# Patient Record
Sex: FEMALE | Race: BLACK OR AFRICAN AMERICAN | Employment: UNEMPLOYED | URBAN - METROPOLITAN AREA
[De-identification: names, ages, dates, MRNs, and addresses within clinical notes are randomized per-mention and may not be internally consistent; named-entity substitution may affect disease eponyms.]

---

## 2019-06-27 ENCOUNTER — HOSPITAL ENCOUNTER (EMERGENCY)
Age: 24
Discharge: HOME | End: 2019-06-27
Attending: EMERGENCY MEDICINE
Payer: MEDICAID

## 2019-06-27 ENCOUNTER — APPOINTMENT (OUTPATIENT)
Dept: ULTRASOUND IMAGING | Age: 24
End: 2019-06-27
Payer: MEDICAID

## 2019-06-27 VITALS
OXYGEN SATURATION: 100 % | SYSTOLIC BLOOD PRESSURE: 105 MMHG | DIASTOLIC BLOOD PRESSURE: 63 MMHG | TEMPERATURE: 98.5 F | HEART RATE: 96 BPM | BODY MASS INDEX: 44.16 KG/M2 | WEIGHT: 240 LBS | HEIGHT: 62 IN | RESPIRATION RATE: 20 BRPM

## 2019-06-27 DIAGNOSIS — R82.71 BACTERIA IN URINE: ICD-10-CM

## 2019-06-27 DIAGNOSIS — O21.9 VOMITING OF PREGNANCY, ANTEPARTUM: Primary | ICD-10-CM

## 2019-06-27 LAB
ABO/RH: NORMAL
ALBUMIN SERPL-MCNC: 3.8 G/DL (ref 3.5–5.2)
ALP BLD-CCNC: 67 U/L (ref 35–104)
ALT SERPL-CCNC: 26 U/L (ref 0–32)
AMORPHOUS: ABNORMAL
ANION GAP SERPL CALCULATED.3IONS-SCNC: 12 MMOL/L (ref 7–16)
AST SERPL-CCNC: 25 U/L (ref 0–31)
BACTERIA: ABNORMAL /HPF
BASOPHILS ABSOLUTE: 0.02 E9/L (ref 0–0.2)
BASOPHILS RELATIVE PERCENT: 0.2 % (ref 0–2)
BILIRUB SERPL-MCNC: 0.2 MG/DL (ref 0–1.2)
BILIRUBIN URINE: ABNORMAL
BLOOD, URINE: ABNORMAL
BUN BLDV-MCNC: 3 MG/DL (ref 6–20)
CALCIUM SERPL-MCNC: 8.7 MG/DL (ref 8.6–10.2)
CHLORIDE BLD-SCNC: 105 MMOL/L (ref 98–107)
CLARITY: ABNORMAL
CO2: 23 MMOL/L (ref 22–29)
COLOR: YELLOW
CREAT SERPL-MCNC: 0.7 MG/DL (ref 0.5–1)
EOSINOPHILS ABSOLUTE: 0.23 E9/L (ref 0.05–0.5)
EOSINOPHILS RELATIVE PERCENT: 2.7 % (ref 0–6)
EPITHELIAL CELLS, UA: ABNORMAL /HPF
GFR AFRICAN AMERICAN: >60
GFR NON-AFRICAN AMERICAN: >60 ML/MIN/1.73
GLUCOSE BLD-MCNC: 121 MG/DL (ref 74–99)
GLUCOSE URINE: NEGATIVE MG/DL
GONADOTROPIN, CHORIONIC (HCG) QUANT: 1900 MIU/ML
HCG, URINE, POC: POSITIVE
HCT VFR BLD CALC: 36.9 % (ref 34–48)
HEMOGLOBIN: 11.5 G/DL (ref 11.5–15.5)
IMMATURE GRANULOCYTES #: 0.03 E9/L
IMMATURE GRANULOCYTES %: 0.4 % (ref 0–5)
KETONES, URINE: 15 MG/DL
LEUKOCYTE ESTERASE, URINE: NEGATIVE
LIPASE: 5 U/L (ref 13–60)
LYMPHOCYTES ABSOLUTE: 1.88 E9/L (ref 1.5–4)
LYMPHOCYTES RELATIVE PERCENT: 22.2 % (ref 20–42)
Lab: NORMAL
MCH RBC QN AUTO: 26.9 PG (ref 26–35)
MCHC RBC AUTO-ENTMCNC: 31.2 % (ref 32–34.5)
MCV RBC AUTO: 86.4 FL (ref 80–99.9)
MONOCYTES ABSOLUTE: 0.68 E9/L (ref 0.1–0.95)
MONOCYTES RELATIVE PERCENT: 8 % (ref 2–12)
NEGATIVE QC PASS/FAIL: NORMAL
NEUTROPHILS ABSOLUTE: 5.64 E9/L (ref 1.8–7.3)
NEUTROPHILS RELATIVE PERCENT: 66.5 % (ref 43–80)
NITRITE, URINE: NEGATIVE
PDW BLD-RTO: 15 FL (ref 11.5–15)
PH UA: 6 (ref 5–9)
PLATELET # BLD: 331 E9/L (ref 130–450)
PMV BLD AUTO: 10.7 FL (ref 7–12)
POSITIVE QC PASS/FAIL: NORMAL
POTASSIUM REFLEX MAGNESIUM: 3.7 MMOL/L (ref 3.5–5)
PROTEIN UA: 30 MG/DL
RBC # BLD: 4.27 E12/L (ref 3.5–5.5)
RBC UA: ABNORMAL /HPF (ref 0–2)
SODIUM BLD-SCNC: 140 MMOL/L (ref 132–146)
SPECIFIC GRAVITY UA: >=1.03 (ref 1–1.03)
TOTAL PROTEIN: 7.4 G/DL (ref 6.4–8.3)
UROBILINOGEN, URINE: 1 E.U./DL
WBC # BLD: 8.5 E9/L (ref 4.5–11.5)
WBC UA: ABNORMAL /HPF (ref 0–5)

## 2019-06-27 PROCEDURE — 99284 EMERGENCY DEPT VISIT MOD MDM: CPT

## 2019-06-27 PROCEDURE — 84702 CHORIONIC GONADOTROPIN TEST: CPT

## 2019-06-27 PROCEDURE — 83690 ASSAY OF LIPASE: CPT

## 2019-06-27 PROCEDURE — 76817 TRANSVAGINAL US OBSTETRIC: CPT

## 2019-06-27 PROCEDURE — 2580000003 HC RX 258: Performed by: EMERGENCY MEDICINE

## 2019-06-27 PROCEDURE — 85025 COMPLETE CBC W/AUTO DIFF WBC: CPT

## 2019-06-27 PROCEDURE — 86901 BLOOD TYPING SEROLOGIC RH(D): CPT

## 2019-06-27 PROCEDURE — 96374 THER/PROPH/DIAG INJ IV PUSH: CPT

## 2019-06-27 PROCEDURE — 2500000003 HC RX 250 WO HCPCS: Performed by: EMERGENCY MEDICINE

## 2019-06-27 PROCEDURE — 86900 BLOOD TYPING SEROLOGIC ABO: CPT

## 2019-06-27 PROCEDURE — 80053 COMPREHEN METABOLIC PANEL: CPT

## 2019-06-27 PROCEDURE — 6360000002 HC RX W HCPCS: Performed by: EMERGENCY MEDICINE

## 2019-06-27 PROCEDURE — 96375 TX/PRO/DX INJ NEW DRUG ADDON: CPT

## 2019-06-27 PROCEDURE — 36415 COLL VENOUS BLD VENIPUNCTURE: CPT

## 2019-06-27 PROCEDURE — 81001 URINALYSIS AUTO W/SCOPE: CPT

## 2019-06-27 RX ORDER — 0.9 % SODIUM CHLORIDE 0.9 %
1000 INTRAVENOUS SOLUTION INTRAVENOUS ONCE
Status: COMPLETED | OUTPATIENT
Start: 2019-06-27 | End: 2019-06-27

## 2019-06-27 RX ORDER — PROMETHAZINE HYDROCHLORIDE 25 MG/1
25 TABLET ORAL EVERY 6 HOURS PRN
Qty: 30 TABLET | Refills: 1 | Status: SHIPPED | OUTPATIENT
Start: 2019-06-27 | End: 2019-07-04

## 2019-06-27 RX ORDER — CEPHALEXIN 500 MG/1
500 CAPSULE ORAL 2 TIMES DAILY
Qty: 14 CAPSULE | Refills: 0 | Status: SHIPPED | OUTPATIENT
Start: 2019-06-27 | End: 2019-07-07

## 2019-06-27 RX ORDER — ONDANSETRON 2 MG/ML
4 INJECTION INTRAMUSCULAR; INTRAVENOUS ONCE
Status: COMPLETED | OUTPATIENT
Start: 2019-06-27 | End: 2019-06-27

## 2019-06-27 RX ADMIN — SODIUM CHLORIDE 1000 ML: 9 INJECTION, SOLUTION INTRAVENOUS at 15:14

## 2019-06-27 RX ADMIN — FAMOTIDINE 20 MG: 10 INJECTION, SOLUTION INTRAVENOUS at 15:15

## 2019-06-27 RX ADMIN — ONDANSETRON 4 MG: 2 INJECTION INTRAMUSCULAR; INTRAVENOUS at 15:15

## 2019-06-27 NOTE — ED PROVIDER NOTES
HPI:  6/27/19,   Time: 2:34 PM         Елена Mcginnis is a 22 y.o. female presenting to the ED for abdominal pain, nausea, diarrhea, beginning 2 ago. The complaint has been intermittent, mild in severity, and worsened by nothing. Patient denies any alleviating factors. She has not tried any medication prior to arrival.  She states that she does have a history of acid reflux and takes famotidine as needed. Patient states that she has had a cholecystectomy. She denies any vomiting, constipation, dysuria, hematuria, melena, hematochezia. No sick contacts. LMP 5/27/19    ROS:   Pertinent positives and negatives are stated within HPI, all other systems reviewed and are negative.  --------------------------------------------- PAST HISTORY ---------------------------------------------  Past Medical History:  has a past medical history of Asthma and GERD (gastroesophageal reflux disease). Past Surgical History:  has a past surgical history that includes Cholecystectomy. Social History:  reports that she has never smoked. She does not have any smokeless tobacco history on file. She reports that she drank alcohol. She reports that she does not use drugs. Family History: family history is not on file. The patients home medications have been reviewed. Allergies:  Toradol [ketorolac tromethamine] and Tramadol    -------------------------------------------------- RESULTS -------------------------------------------------  All laboratory and radiology results have been personally reviewed by myself   LABS:  Results for orders placed or performed during the hospital encounter of 06/27/19   CBC Auto Differential   Result Value Ref Range    WBC 8.5 4.5 - 11.5 E9/L    RBC 4.27 3.50 - 5.50 E12/L    Hemoglobin 11.5 11.5 - 15.5 g/dL    Hematocrit 36.9 34.0 - 48.0 %    MCV 86.4 80.0 - 99.9 fL    MCH 26.9 26.0 - 35.0 pg    MCHC 31.2 (L) 32.0 - 34.5 %    RDW 15.0 11.5 - 15.0 fL    Platelets 294 872 - 739 E9/L    MPV 10.7 7.0 - 12.0 fL    Neutrophils % 66.5 43.0 - 80.0 %    Immature Granulocytes % 0.4 0.0 - 5.0 %    Lymphocytes % 22.2 20.0 - 42.0 %    Monocytes % 8.0 2.0 - 12.0 %    Eosinophils % 2.7 0.0 - 6.0 %    Basophils % 0.2 0.0 - 2.0 %    Neutrophils # 5.64 1.80 - 7.30 E9/L    Immature Granulocytes # 0.03 E9/L    Lymphocytes # 1.88 1.50 - 4.00 E9/L    Monocytes # 0.68 0.10 - 0.95 E9/L    Eosinophils # 0.23 0.05 - 0.50 E9/L    Basophils # 0.02 0.00 - 0.20 E9/L   Comprehensive Metabolic Panel w/ Reflex to MG   Result Value Ref Range    Sodium 140 132 - 146 mmol/L    Potassium reflex Magnesium 3.7 3.5 - 5.0 mmol/L    Chloride 105 98 - 107 mmol/L    CO2 23 22 - 29 mmol/L    Anion Gap 12 7 - 16 mmol/L    Glucose 121 (H) 74 - 99 mg/dL    BUN 3 (L) 6 - 20 mg/dL    CREATININE 0.7 0.5 - 1.0 mg/dL    GFR Non-African American >60 >=60 mL/min/1.73    GFR African American >60     Calcium 8.7 8.6 - 10.2 mg/dL    Total Protein 7.4 6.4 - 8.3 g/dL    Alb 3.8 3.5 - 5.2 g/dL    Total Bilirubin 0.2 0.0 - 1.2 mg/dL    Alkaline Phosphatase 67 35 - 104 U/L    ALT 26 0 - 32 U/L    AST 25 0 - 31 U/L   Lipase   Result Value Ref Range    Lipase 5 (L) 13 - 60 U/L   Urinalysis, reflex to microscopic   Result Value Ref Range    Color, UA Yellow Straw/Yellow    Clarity, UA CLOUDY (A) Clear    Glucose, Ur Negative Negative mg/dL    Bilirubin Urine SMALL (A) Negative    Ketones, Urine 15 (A) Negative mg/dL    Specific Gravity, UA >=1.030 1.005 - 1.030    Blood, Urine TRACE (A) Negative    pH, UA 6.0 5.0 - 9.0    Protein, UA 30 (A) Negative mg/dL    Urobilinogen, Urine 1.0 <2.0 E.U./dL    Nitrite, Urine Negative Negative    Leukocyte Esterase, Urine Negative Negative   Microscopic Urinalysis   Result Value Ref Range    WBC, UA 1-3 0 - 5 /HPF    RBC, UA 1-3 0 - 2 /HPF    Epi Cells MANY /HPF    Bacteria, UA FEW (A) /HPF    Amorphous, UA FEW    hCG, quantitative, pregnancy   Result Value Ref Range    hCG Quant 1900.0 (H) <10 mIU/mL   POC Pregnancy Urine Qual Result Value Ref Range    HCG, Urine, POC Positive Negative    Lot Number [id number]     Positive QC Pass/Fail Pass     Negative QC Pass/Fail Pass    ABO/RH   Result Value Ref Range    ABO/Rh O NEG        RADIOLOGY:  Interpreted by Radiologist.  US OB TRANSVAGINAL   Final Result   Small anechoic structure within the endometrium with a double decidual   sac sign, compatible with an early intrauterine pregnancy. Estimated   gestational age is 4 weeks and 6 days. ------------------------- NURSING NOTES AND VITALS REVIEWED ---------------------------   The nursing notes within the ED encounter and vital signs as below have been reviewed. /63   Pulse 96   Temp 98.5 °F (36.9 °C) (Oral)   Resp 20   Ht 5' 2\" (1.575 m)   Wt 240 lb (108.9 kg)   LMP 05/27/2019   SpO2 100%   BMI 43.90 kg/m²   Oxygen Saturation Interpretation: Normal      ---------------------------------------------------PHYSICAL EXAM--------------------------------------    Constitutional/General: Alert and oriented x3, well appearing, non toxic in NAD  Head: NC/AT  Eyes: PERRL, EOMI  Mouth: Oropharynx clear, handling secretions, no trismus  Neck: Supple, full ROM, no meningeal signs  Pulmonary: Lungs clear to auscultation bilaterally, no wheezes, rales, or rhonchi. Not in respiratory distress  Cardiovascular:  Regular rate and rhythm, no murmurs, gallops, or rubs. 2+ distal pulses  Abdomen: Soft, obese. Tenderness to palpation in the epigastric area. No rebound or guarding. No McBurney point tenderness. Normoactive bowel sounds. Extremities: Moves all extremities x 4.  Warm and well perfused  Skin: warm and dry without rash  Neurologic: GCS 15,  Psych: Normal Affect      ------------------------------ ED COURSE/MEDICAL DECISION MAKING----------------------  Medications   0.9 % sodium chloride bolus (0 mLs Intravenous Stopped 6/27/19 7841)   ondansetron (ZOFRAN) injection 4 mg (4 mg Intravenous Given 6/27/19 9776)   famotidine (PEPCID) injection 20 mg (20 mg Intravenous Given 6/27/19 3296)         Medical Decision Making:    Pregnancy test came back positive. Labs and imaging ordered. Ultrasound shows a 4-week 6-day intrauterine pregnancy. Patient is type O-. She denies any vaginal bleeding. She states that during her last pregnancy she was administered RhoGam.  Urinalysis has bacteria in it. I will start the patient on Keflex. I will also provide phenergan for her nausea. I instructed the patient to contact her OB/GYN for an appointment this week, to take the medication prescribed as directed, and to return for any worsening symptoms or vaginal bleeding. Patient is agreeable with plan of care. Counseling: The emergency provider has spoken with the patient and discussed todays results, in addition to providing specific details for the plan of care and counseling regarding the diagnosis and prognosis. Questions are answered at this time and they are agreeable with the plan.      --------------------------------- IMPRESSION AND DISPOSITION ---------------------------------    IMPRESSION  1. Vomiting of pregnancy, antepartum    2.  Bacteria in urine        DISPOSITION  Disposition: Discharge to home  Patient condition is good                  Tono Gonsalez MD  06/27/19 1462

## 2019-06-28 ENCOUNTER — HOSPITAL ENCOUNTER (EMERGENCY)
Age: 24
Discharge: HOME | End: 2019-06-29
Attending: EMERGENCY MEDICINE
Payer: MEDICAID

## 2019-06-28 VITALS
HEART RATE: 108 BPM | SYSTOLIC BLOOD PRESSURE: 146 MMHG | TEMPERATURE: 98.2 F | HEIGHT: 62 IN | WEIGHT: 240 LBS | DIASTOLIC BLOOD PRESSURE: 104 MMHG | RESPIRATION RATE: 26 BRPM | BODY MASS INDEX: 44.16 KG/M2

## 2019-06-28 DIAGNOSIS — O20.0 THREATENED MISCARRIAGE IN EARLY PREGNANCY: Primary | ICD-10-CM

## 2019-06-28 LAB
BILIRUBIN URINE: NEGATIVE
BLOOD, URINE: ABNORMAL
CLARITY: ABNORMAL
COLOR: YELLOW
GLUCOSE URINE: NEGATIVE MG/DL
KETONES, URINE: ABNORMAL MG/DL
LEUKOCYTE ESTERASE, URINE: NEGATIVE
NITRITE, URINE: NEGATIVE
PH UA: 6 (ref 5–9)
PROTEIN UA: ABNORMAL MG/DL
SPECIFIC GRAVITY UA: 1.02 (ref 1–1.03)
UROBILINOGEN, URINE: 1 E.U./DL

## 2019-06-28 PROCEDURE — 36415 COLL VENOUS BLD VENIPUNCTURE: CPT

## 2019-06-28 PROCEDURE — 84702 CHORIONIC GONADOTROPIN TEST: CPT

## 2019-06-28 PROCEDURE — 99284 EMERGENCY DEPT VISIT MOD MDM: CPT

## 2019-06-28 PROCEDURE — 81001 URINALYSIS AUTO W/SCOPE: CPT

## 2019-06-28 PROCEDURE — 87088 URINE BACTERIA CULTURE: CPT

## 2019-06-28 ASSESSMENT — ENCOUNTER SYMPTOMS: NAUSEA: 1

## 2019-06-28 ASSESSMENT — PAIN DESCRIPTION - FREQUENCY: FREQUENCY: CONTINUOUS

## 2019-06-28 ASSESSMENT — PAIN DESCRIPTION - PAIN TYPE: TYPE: ACUTE PAIN

## 2019-06-28 ASSESSMENT — PAIN DESCRIPTION - DESCRIPTORS: DESCRIPTORS: ACHING;CRAMPING

## 2019-06-28 ASSESSMENT — PAIN SCALES - GENERAL: PAINLEVEL_OUTOF10: 9

## 2019-06-28 ASSESSMENT — PAIN DESCRIPTION - LOCATION: LOCATION: ABDOMEN

## 2019-06-29 ENCOUNTER — APPOINTMENT (OUTPATIENT)
Dept: ULTRASOUND IMAGING | Age: 24
End: 2019-06-29
Payer: MEDICAID

## 2019-06-29 LAB
ABO/RH: NORMAL
ANTIBODY SCREEN: NORMAL
BACTERIA: ABNORMAL /HPF
CRYSTALS, UA: ABNORMAL
EPITHELIAL CELLS, UA: ABNORMAL /HPF
GONADOTROPIN, CHORIONIC (HCG) QUANT: 2917 MIU/ML
MUCUS: PRESENT
RBC UA: ABNORMAL /HPF (ref 0–2)
RHIG LOT NUMBER: NORMAL
WBC UA: ABNORMAL /HPF (ref 0–5)

## 2019-06-29 PROCEDURE — 86850 RBC ANTIBODY SCREEN: CPT

## 2019-06-29 PROCEDURE — 6370000000 HC RX 637 (ALT 250 FOR IP): Performed by: EMERGENCY MEDICINE

## 2019-06-29 PROCEDURE — 96372 THER/PROPH/DIAG INJ SC/IM: CPT

## 2019-06-29 PROCEDURE — 86900 BLOOD TYPING SEROLOGIC ABO: CPT

## 2019-06-29 PROCEDURE — 6360000002 HC RX W HCPCS: Performed by: EMERGENCY MEDICINE

## 2019-06-29 PROCEDURE — 86901 BLOOD TYPING SEROLOGIC RH(D): CPT

## 2019-06-29 PROCEDURE — 36415 COLL VENOUS BLD VENIPUNCTURE: CPT

## 2019-06-29 PROCEDURE — 76817 TRANSVAGINAL US OBSTETRIC: CPT

## 2019-06-29 RX ORDER — ONDANSETRON 4 MG/1
4 TABLET, ORALLY DISINTEGRATING ORAL ONCE
Status: COMPLETED | OUTPATIENT
Start: 2019-06-29 | End: 2019-06-29

## 2019-06-29 RX ADMIN — ONDANSETRON 4 MG: 4 TABLET, ORALLY DISINTEGRATING ORAL at 00:20

## 2019-06-29 RX ADMIN — HUMAN RHO(D) IMMUNE GLOBULIN 300 MCG: 300 INJECTION, SOLUTION INTRAMUSCULAR at 04:45

## 2019-06-29 NOTE — ED PROVIDER NOTES
22year-old G3, P2 presenting with abdominal discomfort and passing some tissue with bleeding earlier tonight. She says she was told she is pregnant yesterday when she was in the emergency department. She had an ultrasound blood work done at that time. Ultrasound showed an intrauterine pregnancy. However, she has had bleeding and passed some tissue since that time. No local OB/GYN. She does have O- blood and has received RhoGam from her prior pregnancies. Yesterday there is no report of bleeding but there has been reported bleeding today. She was preeclamptic with a prior pregnancy. No peritoneal signs, no rigidity, no guarding. Review of Systems   Constitutional: Negative for chills and fever. Gastrointestinal: Positive for nausea. Genitourinary: Positive for vaginal bleeding and vaginal discharge. Negative for flank pain. All other systems reviewed and are negative. Physical Exam   Constitutional: She is oriented to person, place, and time. She appears well-developed and well-nourished. No distress. HENT:   Head: Normocephalic and atraumatic. Eyes: Pupils are equal, round, and reactive to light. Conjunctivae are normal.   Neck: Normal range of motion. No thyromegaly present. Cardiovascular: Normal rate and regular rhythm. Pulmonary/Chest: Effort normal and breath sounds normal. No respiratory distress. Abdominal: Soft. She exhibits no distension. There is no tenderness. There is no rebound and no guarding. Genitourinary: Cervix exhibits no discharge and no friability. No erythema, tenderness or bleeding in the vagina. No vaginal discharge found. Genitourinary Comments: Os closed, no bleeding or blood or discharge present  Vaginal exam with Brea HOLT present   Musculoskeletal: Normal range of motion. She exhibits no edema or tenderness. Neurological: She is alert and oriented to person, place, and time. No cranial nerve deficit.  Coordination normal.   Skin: Skin is Screen NEG        Radiology:  US OB TRANSVAGINAL   Final Result   Probable gestational sac corresponding to 5 weeks 1 day pregnancy   without fetal pole, retained products of conception, or ectopic   pregnancy with estimated gestational sac.          ------------------------- NURSING NOTES AND VITALS REVIEWED ---------------------------  Date / Time Roomed:  6/28/2019 11:17 PM  ED Bed Assignment:  07/07    The nursing notes within the ED encounter and vital signs as below have been reviewed. BP (!) 146/104   Pulse 108   Temp 98.2 °F (36.8 °C) (Oral)   Resp 26   Ht 5' 2\" (1.575 m)   Wt 240 lb (108.9 kg)   LMP 05/27/2019   BMI 43.90 kg/m²   Oxygen Saturation Interpretation: Normal      ------------------------------------------ PROGRESS NOTES ------------------------------------------  I have spoken with the patient and discussed todays results, in addition to providing specific details for the plan of care and counseling regarding the diagnosis and prognosis. Their questions are answered at this time and they are agreeable with the plan. I discussed at length with them reasons for immediate return here for re evaluation. They will followup with primary care by calling their office tomorrow. Patient will receive RhoGam injection as she believes that she had vaginal bleeding. She is a O- blood. She does not have any acute abnormalities requiring hospitalization at this time. She is able to follow-up with her OB/GYN family doctor. --------------------------------- ADDITIONAL PROVIDER NOTES ---------------------------------  At this time the patient is without objective evidence of an acute process requiring hospitalization or inpatient management. They have remained hemodynamically stable throughout their entire ED visit and are stable for discharge with outpatient follow-up.      The plan has been discussed in detail and they are aware of the specific conditions for emergent return, as well as the importance of follow-up. Discharge Medication List as of 6/29/2019  4:55 AM          Diagnosis:  1. Threatened miscarriage in early pregnancy        Disposition:  Patient's disposition: Discharge to home  Patient's condition is stable.            Thressa Schlatter,   06/29/19 9875

## 2019-07-01 ENCOUNTER — HOSPITAL ENCOUNTER (EMERGENCY)
Age: 24
Discharge: LEFT BEFORE DISCHARGE | End: 2019-07-01
Payer: COMMERCIAL

## 2019-07-01 VITALS
WEIGHT: 289.25 LBS | TEMPERATURE: 99.4 F | DIASTOLIC BLOOD PRESSURE: 81 MMHG | HEIGHT: 62 IN | OXYGEN SATURATION: 99 % | RESPIRATION RATE: 20 BRPM | HEART RATE: 101 BPM | BODY MASS INDEX: 53.23 KG/M2 | SYSTOLIC BLOOD PRESSURE: 139 MMHG

## 2019-07-01 LAB — URINE CULTURE, ROUTINE: NORMAL

## 2019-07-01 ASSESSMENT — PAIN DESCRIPTION - LOCATION: LOCATION: ABDOMEN

## 2019-07-01 ASSESSMENT — PAIN DESCRIPTION - FREQUENCY: FREQUENCY: OTHER (COMMENT)

## 2019-07-01 ASSESSMENT — PAIN DESCRIPTION - DESCRIPTORS: DESCRIPTORS: SORE

## 2019-07-07 ENCOUNTER — APPOINTMENT (OUTPATIENT)
Dept: ULTRASOUND IMAGING | Age: 24
End: 2019-07-07
Payer: MEDICAID

## 2019-07-07 ENCOUNTER — HOSPITAL ENCOUNTER (EMERGENCY)
Age: 24
Discharge: HOME | End: 2019-07-07
Payer: MEDICAID

## 2019-07-07 VITALS
WEIGHT: 260 LBS | RESPIRATION RATE: 20 BRPM | BODY MASS INDEX: 47.84 KG/M2 | TEMPERATURE: 98.7 F | HEIGHT: 62 IN | DIASTOLIC BLOOD PRESSURE: 78 MMHG | OXYGEN SATURATION: 99 % | HEART RATE: 82 BPM | SYSTOLIC BLOOD PRESSURE: 118 MMHG

## 2019-07-07 DIAGNOSIS — R10.9 ABDOMINAL CRAMPING AFFECTING PREGNANCY: Primary | ICD-10-CM

## 2019-07-07 DIAGNOSIS — O26.899 ABDOMINAL CRAMPING AFFECTING PREGNANCY: Primary | ICD-10-CM

## 2019-07-07 DIAGNOSIS — O21.9 NAUSEA AND VOMITING DURING PREGNANCY PRIOR TO 22 WEEKS GESTATION: ICD-10-CM

## 2019-07-07 DIAGNOSIS — R82.71 ASYMPTOMATIC BACTERIURIA: ICD-10-CM

## 2019-07-07 LAB
ALBUMIN SERPL-MCNC: 3.7 G/DL (ref 3.5–5.2)
ALP BLD-CCNC: 62 U/L (ref 35–104)
ALT SERPL-CCNC: 11 U/L (ref 0–32)
ANION GAP SERPL CALCULATED.3IONS-SCNC: 12 MMOL/L (ref 7–16)
AST SERPL-CCNC: 9 U/L (ref 0–31)
BACTERIA: ABNORMAL /HPF
BASOPHILS ABSOLUTE: 0.02 E9/L (ref 0–0.2)
BASOPHILS RELATIVE PERCENT: 0.3 % (ref 0–2)
BILIRUB SERPL-MCNC: 0.3 MG/DL (ref 0–1.2)
BILIRUBIN URINE: NEGATIVE
BLOOD, URINE: NEGATIVE
BUN BLDV-MCNC: 4 MG/DL (ref 6–20)
CALCIUM SERPL-MCNC: 9.4 MG/DL (ref 8.6–10.2)
CHLORIDE BLD-SCNC: 102 MMOL/L (ref 98–107)
CLARITY: NORMAL
CO2: 23 MMOL/L (ref 22–29)
COLOR: YELLOW
CREAT SERPL-MCNC: 0.6 MG/DL (ref 0.5–1)
EOSINOPHILS ABSOLUTE: 0.2 E9/L (ref 0.05–0.5)
EOSINOPHILS RELATIVE PERCENT: 2.6 % (ref 0–6)
EPITHELIAL CELLS, UA: ABNORMAL /HPF
GFR AFRICAN AMERICAN: >60
GFR NON-AFRICAN AMERICAN: >60 ML/MIN/1.73
GLUCOSE BLD-MCNC: 91 MG/DL (ref 74–99)
GLUCOSE URINE: NEGATIVE MG/DL
GONADOTROPIN, CHORIONIC (HCG) QUANT: ABNORMAL MIU/ML
HCT VFR BLD CALC: 38.9 % (ref 34–48)
HEMOGLOBIN: 12.2 G/DL (ref 11.5–15.5)
IMMATURE GRANULOCYTES #: 0.02 E9/L
IMMATURE GRANULOCYTES %: 0.3 % (ref 0–5)
KETONES, URINE: NEGATIVE MG/DL
LACTIC ACID: 0.9 MMOL/L (ref 0.5–2.2)
LEUKOCYTE ESTERASE, URINE: NEGATIVE
LIPASE: 11 U/L (ref 13–60)
LYMPHOCYTES ABSOLUTE: 1.75 E9/L (ref 1.5–4)
LYMPHOCYTES RELATIVE PERCENT: 22.6 % (ref 20–42)
MAGNESIUM: 1.8 MG/DL (ref 1.6–2.6)
MCH RBC QN AUTO: 27 PG (ref 26–35)
MCHC RBC AUTO-ENTMCNC: 31.4 % (ref 32–34.5)
MCV RBC AUTO: 86.1 FL (ref 80–99.9)
MONOCYTES ABSOLUTE: 0.56 E9/L (ref 0.1–0.95)
MONOCYTES RELATIVE PERCENT: 7.2 % (ref 2–12)
NEUTROPHILS ABSOLUTE: 5.18 E9/L (ref 1.8–7.3)
NEUTROPHILS RELATIVE PERCENT: 67 % (ref 43–80)
NITRITE, URINE: NEGATIVE
PDW BLD-RTO: 14.9 FL (ref 11.5–15)
PH UA: 6 (ref 5–9)
PLATELET # BLD: 307 E9/L (ref 130–450)
PMV BLD AUTO: 10.9 FL (ref 7–12)
POTASSIUM SERPL-SCNC: 3.7 MMOL/L (ref 3.5–5)
PROTEIN UA: NEGATIVE MG/DL
RBC # BLD: 4.52 E12/L (ref 3.5–5.5)
RBC UA: ABNORMAL /HPF (ref 0–2)
SODIUM BLD-SCNC: 137 MMOL/L (ref 132–146)
SPECIFIC GRAVITY UA: 1.02 (ref 1–1.03)
TOTAL PROTEIN: 7.6 G/DL (ref 6.4–8.3)
UROBILINOGEN, URINE: 1 E.U./DL
WBC # BLD: 7.7 E9/L (ref 4.5–11.5)
WBC UA: ABNORMAL /HPF (ref 0–5)

## 2019-07-07 PROCEDURE — 83690 ASSAY OF LIPASE: CPT

## 2019-07-07 PROCEDURE — 36415 COLL VENOUS BLD VENIPUNCTURE: CPT

## 2019-07-07 PROCEDURE — 83735 ASSAY OF MAGNESIUM: CPT

## 2019-07-07 PROCEDURE — 81001 URINALYSIS AUTO W/SCOPE: CPT

## 2019-07-07 PROCEDURE — 85025 COMPLETE CBC W/AUTO DIFF WBC: CPT

## 2019-07-07 PROCEDURE — 2580000003 HC RX 258: Performed by: NURSE PRACTITIONER

## 2019-07-07 PROCEDURE — 96374 THER/PROPH/DIAG INJ IV PUSH: CPT

## 2019-07-07 PROCEDURE — 87088 URINE BACTERIA CULTURE: CPT

## 2019-07-07 PROCEDURE — 84702 CHORIONIC GONADOTROPIN TEST: CPT

## 2019-07-07 PROCEDURE — 76817 TRANSVAGINAL US OBSTETRIC: CPT

## 2019-07-07 PROCEDURE — 80053 COMPREHEN METABOLIC PANEL: CPT

## 2019-07-07 PROCEDURE — 83605 ASSAY OF LACTIC ACID: CPT

## 2019-07-07 PROCEDURE — 6360000002 HC RX W HCPCS: Performed by: NURSE PRACTITIONER

## 2019-07-07 PROCEDURE — 99284 EMERGENCY DEPT VISIT MOD MDM: CPT

## 2019-07-07 RX ORDER — ONDANSETRON 2 MG/ML
4 INJECTION INTRAMUSCULAR; INTRAVENOUS ONCE
Status: COMPLETED | OUTPATIENT
Start: 2019-07-07 | End: 2019-07-07

## 2019-07-07 RX ORDER — DOXYLAMINE SUCCINATE AND PYRIDOXINE HYDROCHLORIDE, DELAYED RELEASE TABLETS 10 MG/10 MG 10; 10 MG/1; MG/1
TABLET, DELAYED RELEASE ORAL
Qty: 60 TABLET | Refills: 0 | Status: SHIPPED | OUTPATIENT
Start: 2019-07-07

## 2019-07-07 RX ORDER — 0.9 % SODIUM CHLORIDE 0.9 %
1000 INTRAVENOUS SOLUTION INTRAVENOUS ONCE
Status: COMPLETED | OUTPATIENT
Start: 2019-07-07 | End: 2019-07-07

## 2019-07-07 RX ADMIN — ONDANSETRON 4 MG: 2 INJECTION INTRAMUSCULAR; INTRAVENOUS at 17:35

## 2019-07-07 RX ADMIN — SODIUM CHLORIDE 1000 ML: 9 INJECTION, SOLUTION INTRAVENOUS at 17:32

## 2019-07-07 ASSESSMENT — PAIN SCALES - GENERAL: PAINLEVEL_OUTOF10: 7

## 2019-07-07 NOTE — ED PROVIDER NOTES
Ref Range    Magnesium 1.8 1.6 - 2.6 mg/dL   Lipase   Result Value Ref Range    Lipase 11 (L) 13 - 60 U/L   Lactic acid, plasma   Result Value Ref Range    Lactic Acid 0.9 0.5 - 2.2 mmol/L   hCG, quantitative, pregnancy   Result Value Ref Range    hCG Quant 92103.0 (H) <10 mIU/mL   Urinalysis   Result Value Ref Range    Color, UA Yellow Straw/Yellow    Clarity, UA SLCLOUDY Clear    Glucose, Ur Negative Negative mg/dL    Bilirubin Urine Negative Negative    Ketones, Urine Negative Negative mg/dL    Specific Gravity, UA 1.020 1.005 - 1.030    Blood, Urine Negative Negative    pH, UA 6.0 5.0 - 9.0    Protein, UA Negative Negative mg/dL    Urobilinogen, Urine 1.0 <2.0 E.U./dL    Nitrite, Urine Negative Negative    Leukocyte Esterase, Urine Negative Negative   Microscopic Urinalysis   Result Value Ref Range    WBC, UA 0-1 0 - 5 /HPF    RBC, UA 0-1 0 - 2 /HPF    Epi Cells MODERATE /HPF    Bacteria, UA MODERATE (A) /HPF     Imaging: All Radiology results interpreted by Radiologist unless otherwise noted. RADIOLOGY REPORT   Final Result      US OB TRANSVAGINAL   Final Result   Single live intrauterine gestation with estimated gestational age of 10   weeks and 1 day. ED Course / Medical Decision Making     Medications   0.9 % sodium chloride bolus (0 mLs Intravenous Stopped 7/7/19 1907)   ondansetron (ZOFRAN) injection 4 mg (4 mg Intravenous Given 7/7/19 1735)        Re-examination:  7/7/19       Time: 7:20 pm   Patient seen and reexamined the bedside. Patients symptoms are improving. Results are explained to patient in plain language, treatment plan reviewed, patient is agreeable with plan for discharge at this point. Understands that will need to call to arrange follow-up. Consults:   None    Procedures:   none    MDM:   61-year-old female presented to the ER for continued abdominal cramping early in pregnancy. She appears afebrile nontoxic on exam.  She is having trouble tolerating fluids due to emesis.   She

## 2019-07-08 LAB — URINE CULTURE, ROUTINE: NORMAL

## 2019-07-12 ENCOUNTER — HOSPITAL ENCOUNTER (EMERGENCY)
Age: 24
Discharge: HOME | End: 2019-07-13
Payer: MEDICAID

## 2019-07-12 VITALS
TEMPERATURE: 99.2 F | HEIGHT: 62 IN | RESPIRATION RATE: 18 BRPM | BODY MASS INDEX: 53.18 KG/M2 | HEART RATE: 95 BPM | SYSTOLIC BLOOD PRESSURE: 163 MMHG | OXYGEN SATURATION: 99 % | WEIGHT: 289 LBS | DIASTOLIC BLOOD PRESSURE: 79 MMHG

## 2019-07-12 DIAGNOSIS — O21.9 VOMITING PREGNANCY: Primary | ICD-10-CM

## 2019-07-12 LAB
ANION GAP SERPL CALCULATED.3IONS-SCNC: 12 MMOL/L (ref 7–16)
BACTERIA: ABNORMAL /HPF
BASOPHILS ABSOLUTE: 0.02 E9/L (ref 0–0.2)
BASOPHILS RELATIVE PERCENT: 0.2 % (ref 0–2)
BILIRUBIN URINE: NEGATIVE
BLOOD, URINE: NEGATIVE
BUN BLDV-MCNC: 4 MG/DL (ref 6–20)
CALCIUM SERPL-MCNC: 9.1 MG/DL (ref 8.6–10.2)
CHLORIDE BLD-SCNC: 104 MMOL/L (ref 98–107)
CLARITY: ABNORMAL
CO2: 21 MMOL/L (ref 22–29)
COLOR: YELLOW
CREAT SERPL-MCNC: 0.6 MG/DL (ref 0.5–1)
EOSINOPHILS ABSOLUTE: 0.23 E9/L (ref 0.05–0.5)
EOSINOPHILS RELATIVE PERCENT: 2.3 % (ref 0–6)
EPITHELIAL CELLS, UA: ABNORMAL /HPF
GFR AFRICAN AMERICAN: >60
GFR NON-AFRICAN AMERICAN: >60 ML/MIN/1.73
GLUCOSE BLD-MCNC: 100 MG/DL (ref 74–99)
GLUCOSE URINE: NEGATIVE MG/DL
HCT VFR BLD CALC: 38.2 % (ref 34–48)
HEMOGLOBIN: 12.4 G/DL (ref 11.5–15.5)
IMMATURE GRANULOCYTES #: 0.05 E9/L
IMMATURE GRANULOCYTES %: 0.5 % (ref 0–5)
KETONES, URINE: NEGATIVE MG/DL
LEUKOCYTE ESTERASE, URINE: ABNORMAL
LYMPHOCYTES ABSOLUTE: 2.05 E9/L (ref 1.5–4)
LYMPHOCYTES RELATIVE PERCENT: 20.8 % (ref 20–42)
MCH RBC QN AUTO: 27.2 PG (ref 26–35)
MCHC RBC AUTO-ENTMCNC: 32.5 % (ref 32–34.5)
MCV RBC AUTO: 83.8 FL (ref 80–99.9)
MONOCYTES ABSOLUTE: 0.72 E9/L (ref 0.1–0.95)
MONOCYTES RELATIVE PERCENT: 7.3 % (ref 2–12)
NEUTROPHILS ABSOLUTE: 6.8 E9/L (ref 1.8–7.3)
NEUTROPHILS RELATIVE PERCENT: 68.9 % (ref 43–80)
NITRITE, URINE: NEGATIVE
PDW BLD-RTO: 15 FL (ref 11.5–15)
PH UA: 7 (ref 5–9)
PLATELET # BLD: 335 E9/L (ref 130–450)
PMV BLD AUTO: 11 FL (ref 7–12)
POTASSIUM SERPL-SCNC: 4 MMOL/L (ref 3.5–5)
PROTEIN UA: NEGATIVE MG/DL
RBC # BLD: 4.56 E12/L (ref 3.5–5.5)
RBC UA: ABNORMAL /HPF (ref 0–2)
SODIUM BLD-SCNC: 137 MMOL/L (ref 132–146)
SPECIFIC GRAVITY UA: 1.01 (ref 1–1.03)
UROBILINOGEN, URINE: 0.2 E.U./DL
WBC # BLD: 9.9 E9/L (ref 4.5–11.5)
WBC UA: ABNORMAL /HPF (ref 0–5)

## 2019-07-12 PROCEDURE — 6360000002 HC RX W HCPCS: Performed by: NURSE PRACTITIONER

## 2019-07-12 PROCEDURE — 80048 BASIC METABOLIC PNL TOTAL CA: CPT

## 2019-07-12 PROCEDURE — 81001 URINALYSIS AUTO W/SCOPE: CPT

## 2019-07-12 PROCEDURE — 2580000003 HC RX 258: Performed by: NURSE PRACTITIONER

## 2019-07-12 PROCEDURE — 36415 COLL VENOUS BLD VENIPUNCTURE: CPT

## 2019-07-12 PROCEDURE — 99284 EMERGENCY DEPT VISIT MOD MDM: CPT

## 2019-07-12 PROCEDURE — 96374 THER/PROPH/DIAG INJ IV PUSH: CPT

## 2019-07-12 PROCEDURE — 85025 COMPLETE CBC W/AUTO DIFF WBC: CPT

## 2019-07-12 RX ORDER — METOCLOPRAMIDE 10 MG/1
10 TABLET ORAL 4 TIMES DAILY
Qty: 30 TABLET | Refills: 0 | Status: SHIPPED | OUTPATIENT
Start: 2019-07-12

## 2019-07-12 RX ORDER — 0.9 % SODIUM CHLORIDE 0.9 %
1000 INTRAVENOUS SOLUTION INTRAVENOUS ONCE
Status: COMPLETED | OUTPATIENT
Start: 2019-07-12 | End: 2019-07-13

## 2019-07-12 RX ORDER — METOCLOPRAMIDE HYDROCHLORIDE 5 MG/ML
10 INJECTION INTRAMUSCULAR; INTRAVENOUS ONCE
Status: COMPLETED | OUTPATIENT
Start: 2019-07-12 | End: 2019-07-12

## 2019-07-12 RX ADMIN — SODIUM CHLORIDE 1000 ML: 9 INJECTION, SOLUTION INTRAVENOUS at 22:05

## 2019-07-12 RX ADMIN — METOCLOPRAMIDE 10 MG: 5 INJECTION, SOLUTION INTRAMUSCULAR; INTRAVENOUS at 22:05

## 2019-07-13 NOTE — ED PROVIDER NOTES
[facility]     Department of Emergency Medicine   ED  Provider Note  Admit Date/RoomTime: 7/12/2019  9:46 PM  ED Room: 06/06  Chief Complaint   Nausea (can not keep anything down, has been going on for 6 weeks, approx 7 weeks pregnant); Emesis; and Diarrhea    History of Present Illness   Source of history provided by:  patient and spouse/SO. History/Exam Limitations: none. Young Hernandes is a 22 y.o. old female who has a past medical history of:   Past Medical History:   Diagnosis Date    Asthma     GERD (gastroesophageal reflux disease)     presents to the emergency department by private vehicle, for complaints of still present nausea, vomiting & diarrhea which began 6 week(s) prior to arrival.  There has been similar episodes in the past and the patient is approximately 7 weeks gestation. She had an ultrasound on 7/7/2019 showed a live intrauterine pregnancy. She states that she has an initial appointment scheduled with her OB/GYN in 4 days. . She states: no travel, recent antibiotics, tainted food, contact with contagious person, history of GI disease, new medications or change in diet. Stool quality described as watery. The symptoms are associated with none. Symptoms are aggravated by eating and liquids and relieved by nothing. There has been no additional symptoms of abdominal pain, cramping, fever, chills, sweats, headache, arthralgias, myalgias, irritability, URI symptoms, cough, dysuria, vaginal discharge, or vaginal bleeding. ROS    Pertinent positives and negatives are stated within HPI, all other systems reviewed and are negative. Past Surgical History:   Procedure Laterality Date    CHOLECYSTECTOMY     Social History:  reports that she has never smoked. She has never used smokeless tobacco. She reports that she drank alcohol. She reports that she does not use drugs. Family History: family history is not on file. Allergies:  Toradol [ketorolac tromethamine] and Tramadol    Physical Exam            ED Triage Vitals   BP Temp Temp Source Pulse Resp SpO2 Height Weight   07/12/19 2143 07/12/19 2143 07/12/19 2143 07/12/19 2143 07/12/19 2143 07/12/19 2143 07/12/19 2141 07/12/19 2141   (!) 163/79 99.2 °F (37.3 °C) Oral 95 18 99 % 5' 2\" (1.575 m) 285 lb (129.3 kg)      Oxygen Saturation Interpretation: Normal.    Constitutional:  Alert, development consistent with age. HEENT:  NC/NT. Airway patent. Neck:  Normal ROM. Supple. Respiratory:  Clear to auscultation and breath sounds equal.  CV:  Regular rate and rhythm, normal heart sounds, without pathological murmurs, ectopy, gallops, or rubs. GI:  General Appearance: normal.       Bowel sounds: normal bowel sounds. Distension:  None. Tenderness: No abdominal tenderness. Liver: non-tender. Spleen:  non-palpable and non-tender. Pulsatile Mass: absent. Hernia:  no inguinal or femoral hernias noted. Rectal: (chaperone present during examination). not indicated / deferred. Back: CVA Tenderness: No.  Integument:  Normal turgor. Warm, dry, without visible rash, unless noted elsewhere. Lymphatics: No lymphangitis or adenopathy noted. Neurological:  Oriented. Motor functions intact.     Lab / Imaging Results   (All laboratory and radiology results have been personally reviewed by myself)  Labs:  Results for orders placed or performed during the hospital encounter of 07/12/19   CBC Auto Differential   Result Value Ref Range    WBC 9.9 4.5 - 11.5 E9/L    RBC 4.56 3.50 - 5.50 E12/L    Hemoglobin 12.4 11.5 - 15.5 g/dL    Hematocrit 38.2 34.0 - 48.0 %    MCV 83.8 80.0 - 99.9 fL    MCH 27.2 26.0 - 35.0 pg    MCHC 32.5 32.0 - 34.5 %    RDW 15.0 11.5 - 15.0 fL    Platelets 387 581 - 612 E9/L    MPV 11.0 7.0 - 12.0 fL    Neutrophils % 68.9 43.0 - 80.0 %    Immature Granulocytes % 0.5 0.0 - 5.0 %    Lymphocytes % 20.8 20.0 - 42.0 %    Monocytes % 7.3 2.0 - 12.0 %

## 2024-05-10 ENCOUNTER — CLINICAL SUPPORT (OUTPATIENT)
Dept: EMERGENCY MEDICINE | Facility: HOSPITAL | Age: 29
End: 2024-05-10
Payer: COMMERCIAL

## 2024-05-10 ENCOUNTER — HOSPITAL ENCOUNTER (EMERGENCY)
Facility: HOSPITAL | Age: 29
Discharge: HOME | End: 2024-05-10
Payer: COMMERCIAL

## 2024-05-10 ENCOUNTER — APPOINTMENT (OUTPATIENT)
Dept: RADIOLOGY | Facility: HOSPITAL | Age: 29
End: 2024-05-10
Payer: COMMERCIAL

## 2024-05-10 VITALS
HEART RATE: 90 BPM | BODY MASS INDEX: 52.8 KG/M2 | SYSTOLIC BLOOD PRESSURE: 135 MMHG | RESPIRATION RATE: 16 BRPM | WEIGHT: 293 LBS | OXYGEN SATURATION: 100 % | DIASTOLIC BLOOD PRESSURE: 85 MMHG | TEMPERATURE: 99.7 F

## 2024-05-10 DIAGNOSIS — R42 LIGHTHEADEDNESS: Primary | ICD-10-CM

## 2024-05-10 DIAGNOSIS — R35.0 URINARY FREQUENCY: ICD-10-CM

## 2024-05-10 DIAGNOSIS — R11.0 NAUSEA: ICD-10-CM

## 2024-05-10 DIAGNOSIS — N39.0 UTI (URINARY TRACT INFECTION), UNCOMPLICATED: ICD-10-CM

## 2024-05-10 PROBLEM — O99.019 ANEMIA IN PREGNANCY (HHS-HCC): Status: ACTIVE | Noted: 2024-05-10

## 2024-05-10 PROBLEM — K58.9 IRRITABLE BOWEL SYNDROME: Status: ACTIVE | Noted: 2024-05-10

## 2024-05-10 PROBLEM — E66.01 MORBID OBESITY (MULTI): Status: ACTIVE | Noted: 2024-05-10

## 2024-05-10 PROBLEM — R03.0 FINDING OF ABOVE NORMAL BLOOD PRESSURE: Status: ACTIVE | Noted: 2024-05-10

## 2024-05-10 PROBLEM — O99.019 ANEMIA OF PREGNANCY (HHS-HCC): Status: ACTIVE | Noted: 2024-05-10

## 2024-05-10 PROBLEM — K52.9 INFLAMMATION OF STOMACH AND INTESTINE: Status: ACTIVE | Noted: 2024-05-10

## 2024-05-10 PROBLEM — I26.99 PULMONARY EMBOLISM (MULTI): Status: ACTIVE | Noted: 2021-03-04

## 2024-05-10 PROBLEM — J45.909 ASTHMA (HHS-HCC): Status: ACTIVE | Noted: 2024-05-10

## 2024-05-10 PROBLEM — O10.919 CHRONIC HYPERTENSION IN PREGNANCY (HHS-HCC): Status: ACTIVE | Noted: 2024-05-10

## 2024-05-10 LAB
ALBUMIN SERPL BCP-MCNC: 3.9 G/DL (ref 3.4–5)
ALP SERPL-CCNC: 71 U/L (ref 33–110)
ALT SERPL W P-5'-P-CCNC: 9 U/L (ref 7–45)
ANION GAP SERPL CALC-SCNC: 13 MMOL/L (ref 10–20)
APPEARANCE UR: ABNORMAL
AST SERPL W P-5'-P-CCNC: 10 U/L (ref 9–39)
ATRIAL RATE: 86 BPM
BASOPHILS # BLD AUTO: 0.03 X10*3/UL (ref 0–0.1)
BASOPHILS NFR BLD AUTO: 0.3 %
BILIRUB SERPL-MCNC: 0.3 MG/DL (ref 0–1.2)
BILIRUB UR STRIP.AUTO-MCNC: NEGATIVE MG/DL
BUN SERPL-MCNC: 11 MG/DL (ref 6–23)
C TRACH RRNA SPEC QL NAA+PROBE: NEGATIVE
CALCIUM SERPL-MCNC: 9.2 MG/DL (ref 8.6–10.6)
CARDIAC TROPONIN I PNL SERPL HS: <3 NG/L (ref 0–34)
CHLORIDE SERPL-SCNC: 102 MMOL/L (ref 98–107)
CLUE CELLS SPEC QL WET PREP: NORMAL
CO2 SERPL-SCNC: 28 MMOL/L (ref 21–32)
COLOR UR: YELLOW
CREAT SERPL-MCNC: 0.83 MG/DL (ref 0.5–1.05)
EGFRCR SERPLBLD CKD-EPI 2021: >90 ML/MIN/1.73M*2
EOSINOPHIL # BLD AUTO: 0.19 X10*3/UL (ref 0–0.7)
EOSINOPHIL NFR BLD AUTO: 1.8 %
ERYTHROCYTE [DISTWIDTH] IN BLOOD BY AUTOMATED COUNT: 13.2 % (ref 11.5–14.5)
GLUCOSE SERPL-MCNC: 119 MG/DL (ref 74–99)
GLUCOSE UR STRIP.AUTO-MCNC: NORMAL MG/DL
HCT VFR BLD AUTO: 41.8 % (ref 36–46)
HCV AB SER QL: NONREACTIVE
HGB BLD-MCNC: 13.5 G/DL (ref 12–16)
HIV 1+2 AB+HIV1 P24 AG SERPL QL IA: NONREACTIVE
HOLD SPECIMEN: NORMAL
IMM GRANULOCYTES # BLD AUTO: 0.04 X10*3/UL (ref 0–0.7)
IMM GRANULOCYTES NFR BLD AUTO: 0.4 % (ref 0–0.9)
KETONES UR STRIP.AUTO-MCNC: NEGATIVE MG/DL
LEUKOCYTE ESTERASE UR QL STRIP.AUTO: ABNORMAL
LIPASE SERPL-CCNC: 25 U/L (ref 9–82)
LYMPHOCYTES # BLD AUTO: 2.13 X10*3/UL (ref 1.2–4.8)
LYMPHOCYTES NFR BLD AUTO: 19.8 %
MCH RBC QN AUTO: 27.6 PG (ref 26–34)
MCHC RBC AUTO-ENTMCNC: 32.3 G/DL (ref 32–36)
MCV RBC AUTO: 86 FL (ref 80–100)
MONOCYTES # BLD AUTO: 0.54 X10*3/UL (ref 0.1–1)
MONOCYTES NFR BLD AUTO: 5 %
MUCOUS THREADS #/AREA URNS AUTO: ABNORMAL /LPF
N GONORRHOEA DNA SPEC QL PROBE+SIG AMP: NEGATIVE
NEUTROPHILS # BLD AUTO: 7.84 X10*3/UL (ref 1.2–7.7)
NEUTROPHILS NFR BLD AUTO: 72.7 %
NITRITE UR QL STRIP.AUTO: NEGATIVE
NRBC BLD-RTO: 0 /100 WBCS (ref 0–0)
P AXIS: 56 DEGREES
P OFFSET: 210 MS
P ONSET: 162 MS
PH UR STRIP.AUTO: 6 [PH]
PLATELET # BLD AUTO: 333 X10*3/UL (ref 150–450)
POTASSIUM SERPL-SCNC: 3.6 MMOL/L (ref 3.5–5.3)
PR INTERVAL: 128 MS
PREGNANCY TEST URINE, POC: NEGATIVE
PROT SERPL-MCNC: 8.5 G/DL (ref 6.4–8.2)
PROT UR STRIP.AUTO-MCNC: ABNORMAL MG/DL
Q ONSET: 226 MS
QRS COUNT: 14 BEATS
QRS DURATION: 74 MS
QT INTERVAL: 354 MS
QTC CALCULATION(BAZETT): 423 MS
QTC FREDERICIA: 399 MS
R AXIS: 53 DEGREES
RBC # BLD AUTO: 4.89 X10*6/UL (ref 4–5.2)
RBC # UR STRIP.AUTO: ABNORMAL /UL
RBC #/AREA URNS AUTO: ABNORMAL /HPF
SODIUM SERPL-SCNC: 139 MMOL/L (ref 136–145)
SP GR UR STRIP.AUTO: 1.03
SQUAMOUS #/AREA URNS AUTO: ABNORMAL /HPF
T AXIS: 28 DEGREES
T OFFSET: 403 MS
T VAGINALIS SPEC QL WET PREP: NORMAL
TREPONEMA PALLIDUM IGG+IGM AB [PRESENCE] IN SERUM OR PLASMA BY IMMUNOASSAY: NONREACTIVE
TRICHOMONAS REFLEX COMMENT: NORMAL
UROBILINOGEN UR STRIP.AUTO-MCNC: NORMAL MG/DL
VENTRICULAR RATE: 86 BPM
WBC # BLD AUTO: 10.8 X10*3/UL (ref 4.4–11.3)
WBC #/AREA URNS AUTO: >50 /HPF
WBC CLUMPS #/AREA URNS AUTO: ABNORMAL /HPF
WBC VAG QL WET PREP: NORMAL
YEAST VAG QL WET PREP: NORMAL

## 2024-05-10 PROCEDURE — 87086 URINE CULTURE/COLONY COUNT: CPT | Performed by: PHYSICIAN ASSISTANT

## 2024-05-10 PROCEDURE — 83690 ASSAY OF LIPASE: CPT | Performed by: PHYSICIAN ASSISTANT

## 2024-05-10 PROCEDURE — 96361 HYDRATE IV INFUSION ADD-ON: CPT

## 2024-05-10 PROCEDURE — 84484 ASSAY OF TROPONIN QUANT: CPT | Performed by: PHYSICIAN ASSISTANT

## 2024-05-10 PROCEDURE — 2500000004 HC RX 250 GENERAL PHARMACY W/ HCPCS (ALT 636 FOR OP/ED): Performed by: PHYSICIAN ASSISTANT

## 2024-05-10 PROCEDURE — 36415 COLL VENOUS BLD VENIPUNCTURE: CPT | Performed by: PHYSICIAN ASSISTANT

## 2024-05-10 PROCEDURE — 87491 CHLMYD TRACH DNA AMP PROBE: CPT | Performed by: PHYSICIAN ASSISTANT

## 2024-05-10 PROCEDURE — 99285 EMERGENCY DEPT VISIT HI MDM: CPT | Performed by: PHYSICIAN ASSISTANT

## 2024-05-10 PROCEDURE — 87661 TRICHOMONAS VAGINALIS AMPLIF: CPT | Performed by: PHYSICIAN ASSISTANT

## 2024-05-10 PROCEDURE — 81001 URINALYSIS AUTO W/SCOPE: CPT | Performed by: PHYSICIAN ASSISTANT

## 2024-05-10 PROCEDURE — 85025 COMPLETE CBC W/AUTO DIFF WBC: CPT | Performed by: PHYSICIAN ASSISTANT

## 2024-05-10 PROCEDURE — 71046 X-RAY EXAM CHEST 2 VIEWS: CPT

## 2024-05-10 PROCEDURE — 86780 TREPONEMA PALLIDUM: CPT | Performed by: PHYSICIAN ASSISTANT

## 2024-05-10 PROCEDURE — 96374 THER/PROPH/DIAG INJ IV PUSH: CPT

## 2024-05-10 PROCEDURE — 71046 X-RAY EXAM CHEST 2 VIEWS: CPT | Performed by: STUDENT IN AN ORGANIZED HEALTH CARE EDUCATION/TRAINING PROGRAM

## 2024-05-10 PROCEDURE — 2500000001 HC RX 250 WO HCPCS SELF ADMINISTERED DRUGS (ALT 637 FOR MEDICARE OP): Mod: SE | Performed by: PHYSICIAN ASSISTANT

## 2024-05-10 PROCEDURE — 84075 ASSAY ALKALINE PHOSPHATASE: CPT | Performed by: PHYSICIAN ASSISTANT

## 2024-05-10 PROCEDURE — 87210 SMEAR WET MOUNT SALINE/INK: CPT | Mod: 59 | Performed by: PHYSICIAN ASSISTANT

## 2024-05-10 PROCEDURE — 99284 EMERGENCY DEPT VISIT MOD MDM: CPT | Mod: 25

## 2024-05-10 PROCEDURE — 87389 HIV-1 AG W/HIV-1&-2 AB AG IA: CPT | Performed by: PHYSICIAN ASSISTANT

## 2024-05-10 PROCEDURE — 86803 HEPATITIS C AB TEST: CPT | Performed by: PHYSICIAN ASSISTANT

## 2024-05-10 PROCEDURE — 93005 ELECTROCARDIOGRAM TRACING: CPT

## 2024-05-10 PROCEDURE — 81025 URINE PREGNANCY TEST: CPT | Performed by: PHYSICIAN ASSISTANT

## 2024-05-10 RX ORDER — CEPHALEXIN 500 MG/1
500 CAPSULE ORAL 2 TIMES DAILY
Qty: 14 CAPSULE | Refills: 0 | OUTPATIENT
Start: 2024-05-10 | End: 2024-05-13

## 2024-05-10 RX ORDER — ONDANSETRON 4 MG/1
4 TABLET, FILM COATED ORAL EVERY 8 HOURS PRN
Qty: 9 TABLET | Refills: 0 | Status: SHIPPED | OUTPATIENT
Start: 2024-05-10 | End: 2024-05-13

## 2024-05-10 RX ORDER — CEPHALEXIN 250 MG/1
500 CAPSULE ORAL ONCE
Status: COMPLETED | OUTPATIENT
Start: 2024-05-10 | End: 2024-05-10

## 2024-05-10 RX ORDER — ONDANSETRON HYDROCHLORIDE 2 MG/ML
4 INJECTION, SOLUTION INTRAVENOUS ONCE
Status: COMPLETED | OUTPATIENT
Start: 2024-05-10 | End: 2024-05-10

## 2024-05-10 RX ADMIN — ONDANSETRON 4 MG: 2 INJECTION INTRAMUSCULAR; INTRAVENOUS at 08:42

## 2024-05-10 RX ADMIN — CEPHALEXIN 500 MG: 250 CAPSULE ORAL at 09:56

## 2024-05-10 RX ADMIN — SODIUM CHLORIDE, POTASSIUM CHLORIDE, SODIUM LACTATE AND CALCIUM CHLORIDE 1000 ML: 600; 310; 30; 20 INJECTION, SOLUTION INTRAVENOUS at 08:42

## 2024-05-10 ASSESSMENT — PAIN SCALES - GENERAL
PAINLEVEL_OUTOF10: 0 - NO PAIN
PAINLEVEL_OUTOF10: 0 - NO PAIN

## 2024-05-10 ASSESSMENT — LIFESTYLE VARIABLES
HAVE PEOPLE ANNOYED YOU BY CRITICIZING YOUR DRINKING: NO
TOTAL SCORE: 0
HAVE YOU EVER FELT YOU SHOULD CUT DOWN ON YOUR DRINKING: NO
EVER FELT BAD OR GUILTY ABOUT YOUR DRINKING: NO
EVER HAD A DRINK FIRST THING IN THE MORNING TO STEADY YOUR NERVES TO GET RID OF A HANGOVER: NO

## 2024-05-10 ASSESSMENT — COLUMBIA-SUICIDE SEVERITY RATING SCALE - C-SSRS
2. HAVE YOU ACTUALLY HAD ANY THOUGHTS OF KILLING YOURSELF?: NO
6. HAVE YOU EVER DONE ANYTHING, STARTED TO DO ANYTHING, OR PREPARED TO DO ANYTHING TO END YOUR LIFE?: NO
1. IN THE PAST MONTH, HAVE YOU WISHED YOU WERE DEAD OR WISHED YOU COULD GO TO SLEEP AND NOT WAKE UP?: NO

## 2024-05-10 ASSESSMENT — PAIN - FUNCTIONAL ASSESSMENT: PAIN_FUNCTIONAL_ASSESSMENT: 0-10

## 2024-05-10 NOTE — ED TRIAGE NOTES
Pt c/o two isolated episodes of dizziness/light-headedness, once last night and once this morning around 0615. Last night's episode was resolved with a cold shower. This morning pt attempted a cold shower for relief but it did not work. Pt states she feels nauseated this morning, as well. No focal neuro deficits noted. Denies any pain, chest pain or sob.

## 2024-05-10 NOTE — ED PROVIDER NOTES
HPI   Chief Complaint   Patient presents with    Dizziness       Patient is a 29-year-old female who presents today for evaluation of dizziness lightheadedness and vomiting.  Patient states that her symptoms started yesterday at around 130, states that she was feeling lightheaded, she states she felt like the room was spinning but states that the sensation she is feeling is more lightheadedness, not true vertiginous sensation.  She states that she has been having on and off chest pain and shortness of breath for several weeks but does not feel that it was related to the lightheadedness yesterday.  She did not have a syncopal episodes but states that she always had 1 today when it happened again.  She states that she was walking up the stairs to try to get something cold and to take a cold shower, she did not actually pass out.  Patient states when she took a cold shower she had diarrhea in the shower.  Today is the first day that it happened.  She also had an episode of vomiting on her way over here and is now persistently nauseous.  The diarrhea and vomiting were not happening yesterday, just the lightheadedness.  Patient denies any recent cough congestion or illness, she does not believe she has a fever or chills.  Denies any chest pain or shortness of breath happening with the lightheadedness.  Denies any abdominal pain, states that just feels slightly like an upset stomach.  She has been having some more urinary frequency but no burning or urgency.  She has a Mirena IUD, does not believe she is pregnant.  Denies any blurred vision, slurred speech facial droop or numbness tingling weakness.  Patient states she has a slight headache but states that this is not anything new or different.  She does not believe she ate anything funny.  Family member at bedside states that patient has been under a lot of stress lately, her mother recently  and she is currently taking care of her 3 biological children as well as  her goddaughter, she is a single mom and has a lot on her plate.  They are questioning whether she may have had an anxiety attack.  Patient has no prior diagnosis or history of anxiety.                            Corky Coma Scale Score: 15         NIH Stroke Scale: 0             Patient History   Past Medical History:   Diagnosis Date    Acquired absence of other specified parts of digestive tract 03/15/2018    S/P laparoscopic cholecystectomy    Chlamydial infection, unspecified 2014    Chlamydia    Encounter for screening for malignant neoplasm of cervix     Encounter for Papanicolaou smear for cervical cancer screening    Encounter for supervision of normal pregnancy, unspecified, second trimester ([facility]) 2019    Encounter for supervision of normal pregnancy in second trimester, unspecified     Encounter for supervision of other normal pregnancy, unspecified trimester ([facility]) 2019    Prenatal care, subsequent pregnancy    Encounter for supervision of other normal pregnancy, unspecified trimester ([facility]) 2019    Prenatal care, subsequent pregnancy    Essential (primary) hypertension 10/31/2014    Elevated BP    Morbid (severe) obesity due to excess calories ([facility]) 2019    Morbid obesity with BMI of 50.0-59.9, adult    Other conditions influencing health status 08/10/2016    Finger sprain, initial encounter    Personal history of other diseases of the digestive system 2018    History of irritable bowel syndrome    Personal history of other diseases of the respiratory system 08/10/2016    History of asthma    Pregnant state, incidental ([facility]) 08/10/2016    Pregnancy, incidental    Vomiting of pregnancy, unspecified ([facility]) 2019    Nausea and vomiting during pregnancy     Past Surgical History:   Procedure Laterality Date    GALLBLADDER SURGERY  03/15/2018    Gallbladder Surgery     No family history on file.  Social History     Tobacco Use    Smoking  status: Not on file    Smokeless tobacco: Not on file   Substance Use Topics    Alcohol use: Not on file    Drug use: Not on file       Physical Exam   ED Triage Vitals   Temp Pulse Resp BP   -- -- -- --      SpO2 Temp src Heart Rate Source Patient Position   -- -- -- --      BP Location FiO2 (%)     -- --       Physical Exam  Vitals and nursing note reviewed.   Constitutional:       General: She is not in acute distress.     Appearance: Normal appearance. She is not toxic-appearing.   HENT:      Head: Normocephalic and atraumatic.      Nose: Nose normal.   Eyes:      Extraocular Movements: Extraocular movements intact.   Cardiovascular:      Rate and Rhythm: Normal rate and regular rhythm.   Pulmonary:      Effort: Pulmonary effort is normal.      Breath sounds: Normal breath sounds. No wheezing, rhonchi or rales.   Abdominal:      Palpations: Abdomen is soft.      Tenderness: There is no abdominal tenderness. There is no guarding.   Musculoskeletal:         General: Normal range of motion.      Cervical back: Normal range of motion and neck supple.   Skin:     General: Skin is warm and dry.   Neurological:      General: No focal deficit present.      Mental Status: She is alert and oriented to person, place, and time.      GCS: GCS eye subscore is 4. GCS verbal subscore is 5. GCS motor subscore is 6.      Cranial Nerves: Cranial nerves 2-12 are intact. No cranial nerve deficit, dysarthria or facial asymmetry.      Sensory: Sensation is intact. No sensory deficit.      Motor: Motor function is intact. No weakness.   Psychiatric:         Mood and Affect: Mood normal.         Thought Content: Thought content normal.       XR chest 2 views   Final Result   1.  No evidence of acute cardiopulmonary process.        I personally reviewed the images/study and I agree with Dr. Len Benito findings as stated. This study was interpreted at [place], [place]        MACRO:    None        Signed by: Stefan Garretalisa 5/10/2024 10:07 AM   Dictation workstation:   [id number]        Labs Reviewed   CBC WITH AUTO DIFFERENTIAL - Abnormal       Result Value    WBC 10.8      nRBC 0.0      RBC 4.89      Hemoglobin 13.5      Hematocrit 41.8      MCV 86      MCH 27.6      MCHC 32.3      RDW 13.2      Platelets 333      Neutrophils % 72.7      Immature Granulocytes %, Automated 0.4      Lymphocytes % 19.8      Monocytes % 5.0      Eosinophils % 1.8      Basophils % 0.3      Neutrophils Absolute 7.84 (*)     Immature Granulocytes Absolute, Automated 0.04      Lymphocytes Absolute 2.13      Monocytes Absolute 0.54      Eosinophils Absolute 0.19      Basophils Absolute 0.03     COMPREHENSIVE METABOLIC PANEL - Abnormal    Glucose 119 (*)     Sodium 139      Potassium 3.6      Chloride 102      Bicarbonate 28      Anion Gap 13      Urea Nitrogen 11      Creatinine 0.83      eGFR >90      Calcium 9.2      Albumin 3.9      Alkaline Phosphatase 71      Total Protein 8.5 (*)     AST 10      Bilirubin, Total 0.3      ALT 9     URINALYSIS WITH REFLEX CULTURE AND MICROSCOPIC - Abnormal    Color, Urine Yellow      Appearance, Urine Turbid (*)     Specific Gravity, Urine 1.026      pH, Urine 6.0      Protein, Urine 20 (TRACE)      Glucose, Urine Normal      Blood, Urine 0.06 (1+) (*)     Ketones, Urine NEGATIVE      Bilirubin, Urine NEGATIVE      Urobilinogen, Urine Normal      Nitrite, Urine NEGATIVE      Leukocyte Esterase, Urine 500 Xin/µL (*)    MICROSCOPIC ONLY, URINE - Abnormal    WBC, Urine >50 (*)     WBC Clumps, Urine OCCASIONAL      RBC, Urine 11-20 (*)     Squamous Epithelial Cells, Urine 26-50 (1+)      Mucus, Urine FEW     TROPONIN I, HIGH SENSITIVITY - Normal    Troponin I, High Sensitivity <3      Narrative:     Less than 99th percentile of normal range cutoff-  Female and children under 18 years old <35 ng/L; Male <54 ng/L: Negative  Repeat testing should be performed if clinically indicated.      Female and children under 18 years old  ng/L; Male  ng/L:  Consistent with possible cardiac damage and possible increased clinical   risk. Serial measurements may help to assess extent of myocardial damage.     >120 ng/L: Consistent with cardiac damage, increased clinical risk and  myocardial infarction. Serial measurements may help assess extent of   myocardial damage.      NOTE: Children less than 1 year old may have higher baseline troponin   levels and results should be interpreted in conjunction with the overall   clinical context.    NOTE: Troponin I testing is performed using a different   testing methodology at [facility] than at other   [facility]. Direct result comparisons should only   be made within the same method.     LIPASE - Normal    Lipase 25      Narrative:     Venipuncture immediately after or during the administration of Metamizole may lead to falsely low results. Testing should be performed immediately prior to Metamizole dosing.   HIV 1/2 ANTIGEN/ANTIBODY SCREEN WIH REFLEX TO CONFIRMATION - Normal    HIV 1/2 Antigen/Antibody Screen with Reflex to Confirmation Nonreactive      Narrative:     HIV Ag/Ab screen is performed using the Siemens QriouslyllWUT HIV Ag/Ab Combo assay which detects the presence of HIV p24 antigen as well as antibodies to HIV-1 (Group M and O) and HIV-2.  No laboratory evidence of HIV infection. If acute HIV infection is suspected, consider testing for HIV RNA by PCR (viral load).   HEPATITIS C ANTIBODY - Normal    Hepatitis C AB Nonreactive     POCT PREGNANCY, URINE - Normal    Preg Test, Ur Negative     URINE CULTURE   URINE CULTURE   TRICHOMONAS WET PREP REFLEX TO PCR    Trichomonas None Seen      Clue Cells None Seen      Yeast None Seen      WBC 1-2      Trichomonas reflex comment        Value: Trichomonas was not seen by wet prep. Reflex Trichomonas vaginalis by Amplified Detection.   URINALYSIS WITH REFLEX CULTURE AND MICROSCOPIC     Narrative:     The following orders were created for panel order Urinalysis with Reflex Culture and Microscopic.  Procedure                               Abnormality         Status                     ---------                               -----------         ------                     Urinalysis with Reflex C...[[id number]]  Abnormal            Final result               Extra Urine Gray Tube[[id number]]                            In process                   Please view results for these tests on the individual orders.   EXTRA URINE GRAY TUBE   C. TRACHOMATIS + N. GONORRHOEAE, AMPLIFIED   SYPHILIS SCREENING WITH REFLEX   TRICH VAGINALIS, AMPLIFIED         ED Course & MDM   ED Course as of 05/10/24 1058   Fri May 10, 2024   0843 ECG 12 lead  NSR rate of 86, , QRS 74, QTc 423, normal axis, no ischemic changes. [MK]   0907 I was also told by patient's nurse that she is now requesting STD testing, patient states she is just having urinary frequency, no discharge or other symptoms, she would like to be tested including the blood testing.  Test were ordered, patient to self swab. [MK]      ED Course User Index  [MK] Isela Berry PA-C         Diagnoses as of 05/10/24 1058   Lightheadedness   UTI (urinary tract infection), uncomplicated   Urinary frequency   Nausea       Medical Decision Making      MDM: Patient is a 29-year-old female who presents today for evaluation of lightheadedness vomiting and diarrhea, she had 1 episode of vomiting and 1 episode of diarrhea prior to arrival, she is a soft nontender abdomen without any focal tenderness, heart regular rate and rhythm, lungs sound clear examination, cranial nerves II through XII intact, equal strength sensation to bilateral upper and lower extremities.  I was able to stand patient up and walk her to the exam table, she had no change in her lightheadedness, states that she is persistent.  He has not had any syncopal episodes.  I did obtain troponin, EKG,  chest x-ray in addition to labs including CBC CMP lipase urinalysis and pregnancy test.  Patient given a liter of fluids and Zofran to see if this would improve her symptoms. Patient's wet prep is negative, HIV and hepatitis C nonreactive, lipase within normal limits, she is not pregnant, CBC without leukocytosis or anemia, EKG nonischemic, chest x-ray unremarkable, CMP unremarkable, urinalysis did show 500 leukocyte esterase with greater than 50 white blood cells, this was contaminated with quite a few squamous epithelial cells however patient is having urinary frequency and for this reason I will treat with antibiotics, first dose Keflex given here and patient will be sent with Keflex for home, urine culture was sent.  Patient was feeling much improved after a liter of fluids, patient safely be discharged home at this time.  Prescriptions for Keflex and Zofran were provided.  Return precautions discussed.        Procedure  Procedures     Isela Berry PA-C  05/10/24 1054

## 2024-05-11 LAB
BACTERIA UR CULT: ABNORMAL
T VAGINALIS RRNA SPEC QL NAA+PROBE: POSITIVE

## 2024-05-12 LAB — BACTERIA UR CULT: ABNORMAL

## 2024-05-13 ENCOUNTER — PHARMACY VISIT (OUTPATIENT)
Dept: PHARMACY | Facility: CLINIC | Age: 29
End: 2024-05-13
Payer: MEDICAID

## 2024-05-13 ENCOUNTER — HOSPITAL ENCOUNTER (EMERGENCY)
Facility: HOSPITAL | Age: 29
Discharge: HOME | End: 2024-05-13
Attending: EMERGENCY MEDICINE
Payer: COMMERCIAL

## 2024-05-13 ENCOUNTER — TELEPHONE (OUTPATIENT)
Dept: PHARMACY | Facility: HOSPITAL | Age: 29
End: 2024-05-13
Payer: COMMERCIAL

## 2024-05-13 ENCOUNTER — CLINICAL SUPPORT (OUTPATIENT)
Dept: EMERGENCY MEDICINE | Facility: HOSPITAL | Age: 29
End: 2024-05-13
Payer: COMMERCIAL

## 2024-05-13 VITALS
SYSTOLIC BLOOD PRESSURE: 123 MMHG | HEIGHT: 62 IN | HEART RATE: 86 BPM | WEIGHT: 240 LBS | RESPIRATION RATE: 16 BRPM | OXYGEN SATURATION: 98 % | TEMPERATURE: 98 F | DIASTOLIC BLOOD PRESSURE: 81 MMHG | BODY MASS INDEX: 44.16 KG/M2

## 2024-05-13 DIAGNOSIS — A59.01 TRICHOMONAL VAGINITIS: ICD-10-CM

## 2024-05-13 DIAGNOSIS — R42 LIGHTHEADEDNESS: Primary | ICD-10-CM

## 2024-05-13 DIAGNOSIS — N30.00 ACUTE CYSTITIS WITHOUT HEMATURIA: ICD-10-CM

## 2024-05-13 LAB
APPEARANCE UR: ABNORMAL
BACTERIA #/AREA URNS AUTO: ABNORMAL /HPF
BILIRUB UR STRIP.AUTO-MCNC: NEGATIVE MG/DL
COLOR UR: ABNORMAL
GLUCOSE UR STRIP.AUTO-MCNC: NORMAL MG/DL
KETONES UR STRIP.AUTO-MCNC: NEGATIVE MG/DL
LEUKOCYTE ESTERASE UR QL STRIP.AUTO: ABNORMAL
MUCOUS THREADS #/AREA URNS AUTO: ABNORMAL /LPF
NITRITE UR QL STRIP.AUTO: NEGATIVE
PH UR STRIP.AUTO: 7 [PH]
PREGNANCY TEST URINE, POC: NEGATIVE
PROT UR STRIP.AUTO-MCNC: NEGATIVE MG/DL
RBC # UR STRIP.AUTO: ABNORMAL /UL
RBC #/AREA URNS AUTO: >20 /HPF
SP GR UR STRIP.AUTO: 1.02
SQUAMOUS #/AREA URNS AUTO: ABNORMAL /HPF
UROBILINOGEN UR STRIP.AUTO-MCNC: ABNORMAL MG/DL
WBC #/AREA URNS AUTO: >50 /HPF

## 2024-05-13 PROCEDURE — 81025 URINE PREGNANCY TEST: CPT | Performed by: NURSE PRACTITIONER

## 2024-05-13 PROCEDURE — 93010 ELECTROCARDIOGRAM REPORT: CPT | Performed by: EMERGENCY MEDICINE

## 2024-05-13 PROCEDURE — 99284 EMERGENCY DEPT VISIT MOD MDM: CPT | Performed by: NURSE PRACTITIONER

## 2024-05-13 PROCEDURE — 81001 URINALYSIS AUTO W/SCOPE: CPT | Performed by: NURSE PRACTITIONER

## 2024-05-13 PROCEDURE — 87086 URINE CULTURE/COLONY COUNT: CPT | Performed by: NURSE PRACTITIONER

## 2024-05-13 PROCEDURE — 99283 EMERGENCY DEPT VISIT LOW MDM: CPT | Mod: 25

## 2024-05-13 PROCEDURE — 93005 ELECTROCARDIOGRAM TRACING: CPT

## 2024-05-13 PROCEDURE — RXMED WILLOW AMBULATORY MEDICATION CHARGE

## 2024-05-13 RX ORDER — ONDANSETRON 4 MG/1
4 TABLET, ORALLY DISINTEGRATING ORAL EVERY 8 HOURS PRN
Qty: 15 TABLET | Refills: 0 | Status: SHIPPED | OUTPATIENT
Start: 2024-05-13 | End: 2024-07-31 | Stop reason: WASHOUT

## 2024-05-13 RX ORDER — IPRATROPIUM BROMIDE AND ALBUTEROL SULFATE 2.5; .5 MG/3ML; MG/3ML
SOLUTION RESPIRATORY (INHALATION)
Status: DISCONTINUED
Start: 2024-05-13 | End: 2024-05-13 | Stop reason: HOSPADM

## 2024-05-13 RX ORDER — METRONIDAZOLE 500 MG/1
500 TABLET ORAL 2 TIMES DAILY
Qty: 14 TABLET | Refills: 0 | Status: SHIPPED | OUTPATIENT
Start: 2024-05-13 | End: 2024-05-20

## 2024-05-13 RX ORDER — CEPHALEXIN 250 MG/5ML
500 POWDER, FOR SUSPENSION ORAL 2 TIMES DAILY
Qty: 200 ML | Refills: 0 | Status: SHIPPED | OUTPATIENT
Start: 2024-05-13 | End: 2024-05-20

## 2024-05-13 ASSESSMENT — COLUMBIA-SUICIDE SEVERITY RATING SCALE - C-SSRS
2. HAVE YOU ACTUALLY HAD ANY THOUGHTS OF KILLING YOURSELF?: NO
1. IN THE PAST MONTH, HAVE YOU WISHED YOU WERE DEAD OR WISHED YOU COULD GO TO SLEEP AND NOT WAKE UP?: NO
6. HAVE YOU EVER DONE ANYTHING, STARTED TO DO ANYTHING, OR PREPARED TO DO ANYTHING TO END YOUR LIFE?: NO

## 2024-05-13 ASSESSMENT — ENCOUNTER SYMPTOMS: LIGHT-HEADEDNESS: 1

## 2024-05-13 NOTE — PROGRESS NOTES
EDPD Note: Rapid Result Review    Reviewed Ms. Shefali Arango 's chart regarding a positive Trichomonas culture/result that was taken during their recent emergency room visit. The patient was not told about these results prior to leaving the emergency department. Therefore, patient was contacted and given proper education.     Patient in ED with dizziness, lightheadedness, and vomiting. Culture (+) for Trichomonas. Attempted to call in Metronidazole for patient due to positive Trichomonas result. Patient refused oral antibiotics stating she cannot tolerate multiple at a time and wants to be admitted. Patient returning to ED for admission for IV antibiotics.     EDPD Note: Rapid Result Review    Reviewed Ms. Shefali rAango 's chart regarding a positive urine contaminate culture/result that was taken during their recent emergency room visit. The patient was not told about these results prior to leaving the emergency department. Therefore, patient was contacted and given proper education.     Patient in ED for dizziness, lightheadedness, and vomiting. UA resulted inconclusive, patient sent home on Keflex. Endorsed frequency not resolving with antibiotics. Recommend repeat UA for UTI symptoms at this time.     No further follow up needed from EDPD Team.     Tiara Jeffers, PharmD

## 2024-05-13 NOTE — ED PROVIDER NOTES
"Emergency Department Encounter  [facility] EMERGENCY MEDICINE    Patient: Shefali Arango  MRN: [id number]  : 1995  Date of Evaluation: 2024  ED Provider: REI Pena      Chief Complaint       Chief Complaint   Patient presents with    Dizziness        Limitations to History: none  Historian: patient  Records reviewed: EMR inpatient and outpatient notes, Care Everywhere    This is a 29-year-old female with a PMH of asthma, hypertension and PE on Eliquis who presents to the emergency room stating she is lightheaded and dizzy.  Patient reports symptoms started on Thursday.  Patient reports that she has a lightheaded sensation and denies a spinning sensation.  She reports that the lightheaded sensation \"makes me feel warm.\"  Denies any shortness of breath, current chest pain or lower extremity pain or swelling.  Patient states that she is compliant with Eliquis.  Patient presented to [facility] emergency room on May 10, 2024 with similar symptoms.  Lab work was obtained at that time and troponin was within normal limits.  Patient was tested for STDs and tested positive for trichomonas. Patient was told that she has a UTI and was prescribed Keflex. Patient states that she did not  her medication from the pharmacy.  staff member called the patient today to inform her that she was positive for trichomonas and she reports that she was going to proceed to the emergency room for \"IV antibiotics.\"    PMH: asthma, HTN, PE on eliquis  PSH: Cholycystectomy  Allergies: Reviewed per EMR  Social HX: Denies smoking, alcohol or drug use.  Family HX: No family history pertinent to current presenting problem  Medications: Reviewed per EMR    ROS:     Review of Systems   Neurological:  Positive for light-headedness.     14 systems reviewed and otherwise acutely negative except as in the [place].        Past History     Past Medical History:   Diagnosis Date    Acquired absence of other " specified parts of digestive tract 03/15/2018    S/P laparoscopic cholecystectomy    Chlamydial infection, unspecified 2014    Chlamydia    Encounter for screening for malignant neoplasm of cervix     Encounter for Papanicolaou smear for cervical cancer screening    Encounter for supervision of normal pregnancy, unspecified, second trimester ([facility]) 2019    Encounter for supervision of normal pregnancy in second trimester, unspecified     Encounter for supervision of other normal pregnancy, unspecified trimester ([facility]) 2019    Prenatal care, subsequent pregnancy    Encounter for supervision of other normal pregnancy, unspecified trimester ([facility]) 2019    Prenatal care, subsequent pregnancy    Essential (primary) hypertension 10/31/2014    Elevated BP    Morbid (severe) obesity due to excess calories ([facility]) 2019    Morbid obesity with BMI of 50.0-59.9, adult    Other conditions influencing health status 08/10/2016    Finger sprain, initial encounter    Personal history of other diseases of the digestive system 2018    History of irritable bowel syndrome    Personal history of other diseases of the respiratory system 08/10/2016    History of asthma    Pregnant state, incidental ([facility]) 08/10/2016    Pregnancy, incidental    Vomiting of pregnancy, unspecified ([facility]) 2019    Nausea and vomiting during pregnancy     Past Surgical History:   Procedure Laterality Date    GALLBLADDER SURGERY  03/15/2018    Gallbladder Surgery         Medications/Allergies     Previous Medications    CEPHALEXIN (KEFLEX) 500 MG CAPSULE    Take 1 capsule (500 mg) by mouth 2 times a day for 7 days.    ONDANSETRON (ZOFRAN) 4 MG TABLET    Take 1 tablet (4 mg) by mouth every 8 hours if needed for nausea or vomiting for up to 3 days.     Allergies   Allergen Reactions    Ketorolac Other     Other reaction(s): Intolerance    Prochlorperazine Other     Gets restless/anxious    Tramadol  Other     Makes her feel like shes having an anxiety attack        Physical Exam       ED Triage Vitals [05/13/24 1326]   Temperature Heart Rate Respirations BP   36.7 °C (98 °F) 86 16 123/81      Pulse Ox Temp src Heart Rate Source Patient Position   98 % -- -- --      BP Location FiO2 (%)     -- --       Physical Exam:    Appearance: Alert, oriented , cooperative,  in no acute distress. Well nourished & well hydrated.    Skin: Intact,  dry skin, no lesions, rash, petechiae or purpura.     Eyes: PERRLA, EOMs intact.    ENT: Hearing grossly intact. External auditory canals patent, tympanic membranes intact with visible landmarks. Nares patent, mucus membranes moist. Dentition without lesions. Pharynx clear, uvula midline.     Neck: Supple, without meningismus.     Pulmonary: Clear bilaterally with good chest wall excursion. No rales, rhonchi or wheezing. No accessory muscle use or stridor.    Cardiac: Normal S1, S2 without murmur, rub, gallop or extrasystole. No JVD, Carotids without bruits.    Abdomen: Soft, nontender, active bowel sounds.  No palpable organomegaly.  No rebound or guarding.      Musculoskeletal: Full range of motion. no pain, edema, or deformity. Pulses full and equal. No cyanosis, clubbing, or edema.    Neurological:  Normal sensation, no weakness, no focal findings identified.    Psychiatric: Appropriate mood and affect.          Diagnostics   Labs:  Results for orders placed or performed during the hospital encounter of 05/13/24 (from the past 24 hour(s))   Urinalysis with Reflex Culture and Microscopic   Result Value Ref Range    Color, Urine Light-Yellow Light-Yellow, Yellow, Dark-Yellow    Appearance, Urine Turbid (N) Clear    Specific Gravity, Urine 1.020 1.005 - 1.035    pH, Urine 7.0 5.0, 5.5, 6.0, 6.5, 7.0, 7.5, 8.0    Protein, Urine NEGATIVE NEGATIVE, 10 (TRACE), 20 (TRACE) mg/dL    Glucose, Urine Normal Normal mg/dL    Blood, Urine 0.2 (2+) (A) NEGATIVE    Ketones, Urine NEGATIVE  "NEGATIVE mg/dL    Bilirubin, Urine NEGATIVE NEGATIVE    Urobilinogen, Urine 2 (1+) (A) Normal mg/dL    Nitrite, Urine NEGATIVE NEGATIVE    Leukocyte Esterase, Urine 500 Xin/µL (A) NEGATIVE   Microscopic Only, Urine   Result Value Ref Range    WBC, Urine >50 (A) 1-5, NONE /HPF    RBC, Urine >20 (A) NONE, 1-2, 3-5 /HPF    Squamous Epithelial Cells, Urine 10-25 (FEW) Reference range not established. /HPF    Bacteria, Urine 1+ (A) NONE SEEN /HPF    Mucus, Urine FEW Reference range not established. /LPF   POCT pregnancy, urine   Result Value Ref Range    Preg Test, Ur Negative Negative      Radiographs:  No orders to display         Assessment   In brief, Shefali Arango is a 29 y.o. female who presented to the emergency department with lightheadedness. Patient is requesting IV antibiotics for trichomoniasis.          ED Course/MDM     ED Course as of 05/13/24 1506   Mon May 13, 2024   1405 EKG interpreted independently by me, normal sinus rhythm with a rate of 87 normal interval normal axis, no ST or T wave segment changes concerning for ischemia no sign of hypertrophic obstructive cardiomyopathy no sign of preexcitation such as Brenda-Parkinson-White or evidence of Brugada. [RH]      ED Course User Index  [RH] Sree Morgan DO         Diagnoses as of 05/13/24 1506   Lightheadedness   Acute cystitis without hematuria   Trichomonal vaginitis      Visit Vitals  /81   Pulse 86   Temp 36.7 °C (98 °F)   Resp 16   Ht 1.575 m (5' 2\")   Wt 109 kg (240 lb)   LMP  (LMP Unknown) Comment: Negative preg test 5/10/24   SpO2 98%   BMI 43.90 kg/m²   OB Status Implant   BSA 2.18 m²       Medications   ipratropium-albuteroL (Duo-Neb) nebulizer solution  - Omnicell Override Pull (has no administration in time range)       Patient remained stable while in the emergency department. Previous outpatient and ED records were reviewed. Outside records were reviewed.  Results were reviewed.  EKG was reviewed by Dr. Morgan.  Previous lab " "work including troponin was reviewed.  Patient has been able to eat and drink without difficulty.  Patient tested positive for trichomonas.  Urinalysis showed 500 leukocytes, over 50 white cells and 1+ bacteria.  Urine culture was obtained, pending results.  Urine pregnancy test was negative.  Patient states that she never picked up her prescription for Keflex for the UTI.  Patient is requesting IV antibiotics for trichomonas and states that she gets admitted to the hospital when she needs antibiotics because \"my body does not work with oral antibiotics.\"  Dr. Morgan spoke to the patient and discussed that we cannot admit the patient for IV antibiotics for a UTI and trichomonas.  Patient was advised that Flagyl can be crushed and we did write for a prescription for Keflex oral liquid.  Patient was advised to follow-up with her primary care doctor and return the emergency room with worsening symptoms.  Patient was discharged home.     Final Impression      1. Lightheadedness    2. Acute cystitis without hematuria    3. Trichomonal vaginitis          DISPOSITION  Disposition:     Comment: Please note this report has been produced using speech recognition software and may contain errors related to that system including errors in grammar, punctuation, and spelling, as well as words and phrases that may be inappropriate.  If there are any questions or concerns please feel free to contact the dictating provider for clarification.    TOYIN Pena-CNP       ATTENDING ATTESTATION  29-year-old healthy female presenting to the emergency department for essentially a follow-up and medication question.  Patient was seen and evaluated on the 10th for dizziness, general malaise, had a very extensive evaluation including an EKG that was nonischemic no sign of preexcitation she had no complaints of palpitation, had extensive lab evaluation including electrolyte assessment all normal mildly elevated glucose CBC without alesha " anemia, leukocytosis thrombocytopenia or thrombocytosis.  Patient had negative hepatitis C, negative screening for HIV and syphilis, urinalysis was likewise unremarkable with a negative pregnancy but was found to have trichomoniasis positivity on a wet prep after amplified detection.  Patient was actually contacted back by the results referral program the patient stated that she was unable to tolerate oral medication typically and return to the emergency department with request for consideration of admission and IV antibiotics.  Patient is very well-appearing at the bedside hemodynamically stable no sign of sepsis, the records were reviewed from the prior visit including an unremarkable pelvic exam without evidence of PID and no concern for tubo-ovarian abscess.  3 days later the patient appears very well, I have no clinical concern for primary gynecologic pathology the abdomen is soft unremarkable cardiopulmonary exam.  Repeat EKG reassuring without sign of any acute pathology precipitating the dizziness that appeared to be more related to position change and was resolved with hydration.  Doubt cardiopulmonary pathology.  Patient has no evidence of any infectious disease.  Regarding the trichomonas we will investigate potential alternative mechanisms of delivery of the appropriate therapy which is Flagyl, patient was able to tolerate oral medications prior she was given 1 dose of oral Keflex before discharge with plans to presumably treat a possible early UTI with a culture pending, patient forgot that prescription but was able to tolerate the oral dose before discharge.  We will investigate alternative mechanisms of administration of Flagyl we will rewrite the Keflex prescription no sign of ascending infection such as pyelonephritis.  Anticipate a safe discharge home monitor for symptoms return with concerns    EKG reviewed independently by me and agree with interpretation above.    Sree Morgan,  [facility]    The patient was seen by the resident/fellow.  I have personally performed a substantive portion of the encounter.  I have seen and examined the patient; agree with the workup, evaluation, MDM, management and diagnosis.    I have reviewed all the nurses' notes and have confirmed their findings, and have incorporated those findings into this medical record.   The care plan has been discussed with the resident/fellow; I have reviewed the resident/fellow’s note and agree with the documented findings with the exception/addition of information listed above.  On my own examination I agree and incorporated in this document my own history, examination findings and clinical decision making.  All notation in this Addendum supersedes information presented by the resident or ANCA as listed above.        Marguerite Barba, TOYIN-CNP  05/13/24 1500

## 2024-05-14 LAB
ATRIAL RATE: 87 BPM
HOLD SPECIMEN: NORMAL
P AXIS: 51 DEGREES
P OFFSET: 205 MS
P ONSET: 159 MS
PR INTERVAL: 132 MS
Q ONSET: 225 MS
QRS COUNT: 14 BEATS
QRS DURATION: 66 MS
QT INTERVAL: 352 MS
QTC CALCULATION(BAZETT): 423 MS
QTC FREDERICIA: 398 MS
R AXIS: 50 DEGREES
T AXIS: 34 DEGREES
T OFFSET: 401 MS
VENTRICULAR RATE: 87 BPM

## 2024-05-15 LAB — BACTERIA UR CULT: ABNORMAL

## 2024-05-16 ENCOUNTER — TELEPHONE (OUTPATIENT)
Dept: PHARMACY | Facility: HOSPITAL | Age: 29
End: 2024-05-16
Payer: COMMERCIAL

## 2024-05-16 NOTE — PROGRESS NOTES
EDPD Note: Antibiotics Reviewed and Warranted    Contacted Mr./Mrs./Ms. Shefali Arango regarding a positive urine culture/result that was taken during their recent emergency room visit. I completed education with spouse . The patient is being treated appropriately with Keflex. Patient states urinary symptoms have resolved. Only endorses lightheadedness. Reccommended patient to drink plenty of fluids ad follow up with PCP or urgent care dizziness does not resolve after full course of treatment. Patient educated on the worsening signs and symptoms of UTI including an increase in flank pain or suprapubic pain, chills, fever, urgency, burning, frequency, foul smelling urine, blood in urine, and any abnormal discharge. Made aware to seek medical attention if experiencing any new or worsening symptoms with PCP.       Susceptibility data from last 90 days.  Collected Specimen Info Organism Amoxicillin/Clavulanate Ampicillin Ampicillin/Sulbactam Cefazolin Cefazolin (uncomplicated UTIs only) Ciprofloxacin Gentamicin Nitrofurantoin Piperacillin/Tazobactam Trimethoprim/Sulfamethoxazole   05/13/24 Urine from Clean Catch/Voided Klebsiella pneumoniae/variicola  S  R  S  S  S  S  S  S  S  S        No further follow up needed from EDPD Team.     Pratima Turpin, PharmD

## 2024-05-21 PROBLEM — R03.0 FINDING OF ABOVE NORMAL BLOOD PRESSURE: Status: RESOLVED | Noted: 2024-05-10 | Resolved: 2024-05-21

## 2024-05-21 PROBLEM — O99.019 ANEMIA IN PREGNANCY (HHS-HCC): Status: RESOLVED | Noted: 2024-05-10 | Resolved: 2024-05-21

## 2024-05-31 ENCOUNTER — PROCEDURE VISIT (OUTPATIENT)
Dept: OBSTETRICS AND GYNECOLOGY | Facility: CLINIC | Age: 29
End: 2024-05-31
Payer: COMMERCIAL

## 2024-05-31 VITALS
BODY MASS INDEX: 56 KG/M2 | WEIGHT: 293 LBS | HEART RATE: 78 BPM | DIASTOLIC BLOOD PRESSURE: 80 MMHG | SYSTOLIC BLOOD PRESSURE: 124 MMHG

## 2024-05-31 DIAGNOSIS — Z12.4 CERVICAL CANCER SCREENING: ICD-10-CM

## 2024-05-31 DIAGNOSIS — Z30.011 ENCOUNTER FOR INITIAL PRESCRIPTION OF CONTRACEPTIVE PILLS: Primary | ICD-10-CM

## 2024-05-31 DIAGNOSIS — Z30.432 ENCOUNTER FOR IUD REMOVAL: ICD-10-CM

## 2024-05-31 PROBLEM — N39.0 URINARY TRACT INFECTION: Status: RESOLVED | Noted: 2024-05-10 | Resolved: 2024-05-31

## 2024-05-31 PROBLEM — O99.019 ANEMIA OF PREGNANCY (HHS-HCC): Status: RESOLVED | Noted: 2024-05-10 | Resolved: 2024-05-31

## 2024-05-31 PROCEDURE — 87491 CHLMYD TRACH DNA AMP PROBE: CPT | Performed by: STUDENT IN AN ORGANIZED HEALTH CARE EDUCATION/TRAINING PROGRAM

## 2024-05-31 PROCEDURE — 87661 TRICHOMONAS VAGINALIS AMPLIF: CPT | Performed by: STUDENT IN AN ORGANIZED HEALTH CARE EDUCATION/TRAINING PROGRAM

## 2024-05-31 RX ORDER — DROSPIRENONE 4 MG/1
4 TABLET, FILM COATED ORAL DAILY
Qty: 30 TABLET | Refills: 3 | Status: SHIPPED | OUTPATIENT
Start: 2024-05-31 | End: 2024-09-28

## 2024-05-31 ASSESSMENT — PAIN SCALES - GENERAL: PAINLEVEL: 7

## 2024-05-31 NOTE — ASSESSMENT & PLAN NOTE
-IUD Removed safely, see procedure note for details  -Rx for POPs sent today. Given prior hx of PE would refrain from starting estrogen containing methods of BC  -Desires BTL later this year, plan for FUV in 3 months to discuss scheduling in the fall.

## 2024-05-31 NOTE — PROGRESS NOTES
Subjective   Patient ID: Shefali Arango is a 29 y.o. female who presents for IUD Removal (Pt in office for IUD Removal (placed 3 yrs ago)/LMP; IUD/Last pap 2019 wnl/Chaperone accepted).    HPI Patient is a  presenting for IUD removal. States she is having more pelvic pain and cramping with it in and would like it removed. Has had it in for 3 years. Previously tried the Mirena for 3 years and had a similar effect. Would like to transition to POPs and discuss getting a BTL later this year.    Review of Systems   All other systems reviewed and are negative.      IUD Removal    Date/Time: 2024 4:21 PM    Performed by: Raymond Cary MD  Authorized by: Vicki Doran MD    Consent:     Consent obtained:  Written    Consent given by:  Patient    Procedure risks and benefits discussed: yes      Patient questions answered: yes      Patient agrees, verbalizes understanding, and wants to proceed: yes      Educational handouts given: yes      Instructions and paperwork completed: yes    Universal protocol:     Patient states understanding of procedure being performed: yes      Test results available and properly labeled: yes      Imaging studies available: no      Required blood products, implants, devices, and special equipment available: no    Procedure:     Removed with no complications: yes      Removal due to mechanical complications of IUD: no      Other reason for removal:  Pelvic Pain       Objective   Physical Exam  Constitutional:       Appearance: Normal appearance.   HENT:      Head: Normocephalic and atraumatic.      Nose: Nose normal.      Mouth/Throat:      Mouth: Mucous membranes are moist.   Eyes:      Extraocular Movements: Extraocular movements intact.      Conjunctiva/sclera: Conjunctivae normal.   Cardiovascular:      Rate and Rhythm: Normal rate.      Comments: Warm and well perfused  Pulmonary:      Effort: Pulmonary effort is normal.      Breath sounds: Normal breath sounds.    Abdominal:      General: Bowel sounds are normal. There is no distension.      Palpations: Abdomen is soft. There is no mass.   Genitourinary:     Vagina: Normal.      Cervix: Normal.      Comments: IUD Strings not visualized on initial exam.  Musculoskeletal:         General: Normal range of motion.      Cervical back: Normal range of motion.   Skin:     General: Skin is warm and dry.   Neurological:      General: No focal deficit present.      Mental Status: She is alert.   Psychiatric:         Mood and Affect: Mood normal.         Behavior: Behavior normal.         Thought Content: Thought content normal.         Assessment/Plan     Encounter for IUD removal  -IUD Removed safely, see procedure note for details  -Rx for POPs sent today. Given prior hx of PE would refrain from starting estrogen containing methods of BC  -Desires BTL later this year, plan for FUV in 3 months to discuss scheduling in the fall.    Cervical cancer screening  -Pap collected today, follow up results.        RTC 3 months for BC check in    D/w Dr. Espinoza Cary MD 05/31/24 5:15 PM --PGY-3

## 2024-06-01 ENCOUNTER — CLINICAL SUPPORT (OUTPATIENT)
Dept: EMERGENCY MEDICINE | Facility: HOSPITAL | Age: 29
End: 2024-06-01
Payer: COMMERCIAL

## 2024-06-01 ENCOUNTER — HOSPITAL ENCOUNTER (EMERGENCY)
Facility: HOSPITAL | Age: 29
Discharge: HOME | End: 2024-06-01
Attending: EMERGENCY MEDICINE
Payer: COMMERCIAL

## 2024-06-01 ENCOUNTER — APPOINTMENT (OUTPATIENT)
Dept: RADIOLOGY | Facility: HOSPITAL | Age: 29
End: 2024-06-01
Payer: COMMERCIAL

## 2024-06-01 VITALS
DIASTOLIC BLOOD PRESSURE: 84 MMHG | RESPIRATION RATE: 18 BRPM | HEIGHT: 62 IN | BODY MASS INDEX: 47.84 KG/M2 | WEIGHT: 260 LBS | TEMPERATURE: 99.6 F | OXYGEN SATURATION: 97 % | HEART RATE: 71 BPM | SYSTOLIC BLOOD PRESSURE: 126 MMHG

## 2024-06-01 DIAGNOSIS — R07.9 CHEST PAIN AT REST: Primary | ICD-10-CM

## 2024-06-01 LAB
ALBUMIN SERPL BCP-MCNC: 4.1 G/DL (ref 3.4–5)
ALP SERPL-CCNC: 58 U/L (ref 33–110)
ALT SERPL W P-5'-P-CCNC: 13 U/L (ref 7–45)
ANION GAP SERPL CALC-SCNC: 16 MMOL/L (ref 10–20)
AST SERPL W P-5'-P-CCNC: 13 U/L (ref 9–39)
B-HCG SERPL-ACNC: <3 MIU/ML
BASOPHILS # BLD AUTO: 0.04 X10*3/UL (ref 0–0.1)
BASOPHILS NFR BLD AUTO: 0.4 %
BILIRUB SERPL-MCNC: 0.4 MG/DL (ref 0–1.2)
BNP SERPL-MCNC: 12 PG/ML (ref 0–99)
BUN SERPL-MCNC: 5 MG/DL (ref 6–23)
CALCIUM SERPL-MCNC: 9.7 MG/DL (ref 8.6–10.6)
CARDIAC TROPONIN I PNL SERPL HS: <3 NG/L (ref 0–34)
CHLORIDE SERPL-SCNC: 100 MMOL/L (ref 98–107)
CO2 SERPL-SCNC: 25 MMOL/L (ref 21–32)
CREAT SERPL-MCNC: 0.68 MG/DL (ref 0.5–1.05)
D DIMER PPP FEU-MCNC: 292 NG/ML FEU
EGFRCR SERPLBLD CKD-EPI 2021: >90 ML/MIN/1.73M*2
EOSINOPHIL # BLD AUTO: 0.1 X10*3/UL (ref 0–0.7)
EOSINOPHIL NFR BLD AUTO: 0.9 %
ERYTHROCYTE [DISTWIDTH] IN BLOOD BY AUTOMATED COUNT: 13.8 % (ref 11.5–14.5)
FLUAV RNA RESP QL NAA+PROBE: NOT DETECTED
FLUBV RNA RESP QL NAA+PROBE: NOT DETECTED
GLUCOSE SERPL-MCNC: 99 MG/DL (ref 74–99)
HCT VFR BLD AUTO: 41.4 % (ref 36–46)
HGB BLD-MCNC: 13.5 G/DL (ref 12–16)
IMM GRANULOCYTES # BLD AUTO: 0.04 X10*3/UL (ref 0–0.7)
IMM GRANULOCYTES NFR BLD AUTO: 0.4 % (ref 0–0.9)
LYMPHOCYTES # BLD AUTO: 1.62 X10*3/UL (ref 1.2–4.8)
LYMPHOCYTES NFR BLD AUTO: 14.7 %
MAGNESIUM SERPL-MCNC: 1.95 MG/DL (ref 1.6–2.4)
MCH RBC QN AUTO: 28 PG (ref 26–34)
MCHC RBC AUTO-ENTMCNC: 32.6 G/DL (ref 32–36)
MCV RBC AUTO: 86 FL (ref 80–100)
MONOCYTES # BLD AUTO: 0.59 X10*3/UL (ref 0.1–1)
MONOCYTES NFR BLD AUTO: 5.3 %
NEUTROPHILS # BLD AUTO: 8.66 X10*3/UL (ref 1.2–7.7)
NEUTROPHILS NFR BLD AUTO: 78.3 %
NRBC BLD-RTO: 0 /100 WBCS (ref 0–0)
PLATELET # BLD AUTO: 417 X10*3/UL (ref 150–450)
POTASSIUM SERPL-SCNC: 3.7 MMOL/L (ref 3.5–5.3)
PROT SERPL-MCNC: 8.4 G/DL (ref 6.4–8.2)
RBC # BLD AUTO: 4.83 X10*6/UL (ref 4–5.2)
SARS-COV-2 RNA RESP QL NAA+PROBE: NOT DETECTED
SODIUM SERPL-SCNC: 137 MMOL/L (ref 136–145)
TSH SERPL-ACNC: 0.98 MIU/L (ref 0.44–3.98)
WBC # BLD AUTO: 11.1 X10*3/UL (ref 4.4–11.3)

## 2024-06-01 PROCEDURE — 84484 ASSAY OF TROPONIN QUANT: CPT

## 2024-06-01 PROCEDURE — 80053 COMPREHEN METABOLIC PANEL: CPT

## 2024-06-01 PROCEDURE — 71046 X-RAY EXAM CHEST 2 VIEWS: CPT | Mod: FOREIGN READ | Performed by: RADIOLOGY

## 2024-06-01 PROCEDURE — 85025 COMPLETE CBC W/AUTO DIFF WBC: CPT

## 2024-06-01 PROCEDURE — 83880 ASSAY OF NATRIURETIC PEPTIDE: CPT

## 2024-06-01 PROCEDURE — 99283 EMERGENCY DEPT VISIT LOW MDM: CPT | Mod: 25

## 2024-06-01 PROCEDURE — 71046 X-RAY EXAM CHEST 2 VIEWS: CPT

## 2024-06-01 PROCEDURE — 2500000005 HC RX 250 GENERAL PHARMACY W/O HCPCS: Mod: SE

## 2024-06-01 PROCEDURE — 93005 ELECTROCARDIOGRAM TRACING: CPT

## 2024-06-01 PROCEDURE — 93010 ELECTROCARDIOGRAM REPORT: CPT | Performed by: EMERGENCY MEDICINE

## 2024-06-01 PROCEDURE — 36415 COLL VENOUS BLD VENIPUNCTURE: CPT

## 2024-06-01 PROCEDURE — 84443 ASSAY THYROID STIM HORMONE: CPT

## 2024-06-01 PROCEDURE — 85379 FIBRIN DEGRADATION QUANT: CPT

## 2024-06-01 PROCEDURE — 84702 CHORIONIC GONADOTROPIN TEST: CPT

## 2024-06-01 PROCEDURE — 99285 EMERGENCY DEPT VISIT HI MDM: CPT | Performed by: EMERGENCY MEDICINE

## 2024-06-01 PROCEDURE — 87636 SARSCOV2 & INF A&B AMP PRB: CPT

## 2024-06-01 PROCEDURE — 83735 ASSAY OF MAGNESIUM: CPT

## 2024-06-01 RX ORDER — ACETAMINOPHEN 325 MG/1
650 TABLET ORAL EVERY 6 HOURS PRN
Qty: 30 TABLET | Refills: 0 | Status: SHIPPED | OUTPATIENT
Start: 2024-06-01 | End: 2024-11-17

## 2024-06-01 RX ORDER — ONDANSETRON 4 MG/1
4 TABLET, ORALLY DISINTEGRATING ORAL ONCE
Status: COMPLETED | OUTPATIENT
Start: 2024-06-01 | End: 2024-06-01

## 2024-06-01 RX ORDER — ONDANSETRON 4 MG/1
4 TABLET, FILM COATED ORAL EVERY 6 HOURS
Qty: 12 TABLET | Refills: 0 | Status: SHIPPED | OUTPATIENT
Start: 2024-06-01 | End: 2024-06-04

## 2024-06-01 RX ORDER — ACETAMINOPHEN 325 MG/1
650 TABLET ORAL ONCE
Status: COMPLETED | OUTPATIENT
Start: 2024-06-01 | End: 2024-06-01

## 2024-06-01 RX ADMIN — ONDANSETRON 4 MG: 4 TABLET, ORALLY DISINTEGRATING ORAL at 21:38

## 2024-06-01 RX ADMIN — ACETAMINOPHEN 650 MG: 325 TABLET ORAL at 21:38

## 2024-06-01 ASSESSMENT — PAIN - FUNCTIONAL ASSESSMENT: PAIN_FUNCTIONAL_ASSESSMENT: 0-10

## 2024-06-01 ASSESSMENT — COLUMBIA-SUICIDE SEVERITY RATING SCALE - C-SSRS
1. IN THE PAST MONTH, HAVE YOU WISHED YOU WERE DEAD OR WISHED YOU COULD GO TO SLEEP AND NOT WAKE UP?: NO
2. HAVE YOU ACTUALLY HAD ANY THOUGHTS OF KILLING YOURSELF?: NO
6. HAVE YOU EVER DONE ANYTHING, STARTED TO DO ANYTHING, OR PREPARED TO DO ANYTHING TO END YOUR LIFE?: NO

## 2024-06-01 ASSESSMENT — LIFESTYLE VARIABLES
EVER HAD A DRINK FIRST THING IN THE MORNING TO STEADY YOUR NERVES TO GET RID OF A HANGOVER: NO
EVER FELT BAD OR GUILTY ABOUT YOUR DRINKING: NO
TOTAL SCORE: 0
HAVE PEOPLE ANNOYED YOU BY CRITICIZING YOUR DRINKING: NO
HAVE YOU EVER FELT YOU SHOULD CUT DOWN ON YOUR DRINKING: NO

## 2024-06-01 ASSESSMENT — PAIN SCALES - GENERAL: PAINLEVEL_OUTOF10: 7

## 2024-06-01 NOTE — ED TRIAGE NOTES
Pt presents to ED with multiple medical complaints. Pt says for the last few days she has been having chest pain and SOB - pt has a Hx of asthma and a previous PE. Pt also endorsing generalized weakness for the last month, fevers/chills for the last few days and body aches. Pt says she had her birth control removed yesterday, but no significant bleeding or cramping since then. Pt says her sister was recently diagnosed with COVID, but that she hasn't spent much time around her sister so she doesn't think she has COVID. Pt arrived N/V, pt took  a zofran and some tylenol at home earlier. Pt is A&O x3/4, pt is ambulatory and able to speak in full sentences.

## 2024-06-01 NOTE — ED PROVIDER NOTES
EMERGENCY DEPARTMENT ENCOUNTER      Pt Name: Shefali Arango  MRN: [id number]  Birthdate 1995  Date of evaluation: 6/1/2024  Provider: Floyd Wellington MD    CHIEF COMPLAINT       Chief Complaint   Patient presents with    Shortness of Breath         HISTORY OF PRESENT ILLNESS    29-year-old female with a history of HTN, asthma, and PE not on Eliquis presenting to the ED with a complaint of intermittent presyncope.  She reports having intermittent episodes of lightheadedness as if she was going to pass out with chest pain, shortness of breath, feelings of warmth, and nausea for the past month in which she was told she had a UTI and trichomonas causing her symptoms.  She completed the course of Keflex and Flagyl and was feeling better 3 days ago but then her symptoms returned today.  She had 1 episode of yellow vomitus today.  States she took Tylenol and Zofran prior to arrival which did help with her symptoms.  No active chest pain.  She had her Moderna ring removed yesterday due to concern of contributing to her symptoms.  Has not had a menses since having the ring.  Denies fevers, coughing, diarrhea, or blood in urine or stool.      History provided by:  Patient      Nursing Notes were reviewed.    PAST MEDICAL HISTORY     Past Medical History:   Diagnosis Date    Asthma ([facility])     Essential (primary) hypertension 10/31/2014    Elevated BP    History of IBS     Hx of chlamydia infection     Hx of pulmonary embolus     Morbid (severe) obesity due to excess calories (Multi) 12/16/2019    Morbid obesity with BMI of 50.0-59.9, adult         SURGICAL HISTORY       Past Surgical History:   Procedure Laterality Date    GALLBLADDER SURGERY  03/15/2018    Gallbladder Surgery         CURRENT MEDICATIONS       Previous Medications    ALBUTEROL 0.375 MG/ML CONT NEB SYR-SIMPLE    Inhale.    LISINOPRIL ORAL    Take by mouth.    ONDANSETRON ODT (ZOFRAN-ODT) 4 MG DISINTEGRATING TABLET    Take 1 tablet (4 mg) by mouth every 8  hours if needed for nausea or vomiting.    SLYND 4 MG (28) TABLET    Take 1 tablet by mouth once daily.       ALLERGIES     Ketorolac, Prochlorperazine, and Tramadol    FAMILY HISTORY     No family history on file.       SOCIAL HISTORY       Social History     Socioeconomic History    Marital status: Single     Spouse name: Not on file    Number of children: Not on file    Years of education: Not on file    Highest education level: Not on file   Occupational History    Not on file   Tobacco Use    Smoking status: Never    Smokeless tobacco: Never   Substance and Sexual Activity    Alcohol use: Never    Drug use: Never    Sexual activity: Not on file   Other Topics Concern    Not on file   Social History Narrative    Not on file     Social Determinants of Health     Financial Resource Strain: Not on file   Food Insecurity: Not on file   Transportation Needs: Not on file   Physical Activity: Not on file   Stress: Not on file   Social Connections: Not on file   Intimate Partner Violence: Not on file   Housing Stability: Not on file       SCREENINGS                        PHYSICAL EXAM    (up to 7 for level 4, 8 or more for level 5)     ED Triage Vitals [06/01/24 1829]   Temperature Heart Rate Respirations BP   37.6 °C (99.6 °F) 71 18 126/84      Pulse Ox Temp Source Heart Rate Source Patient Position   97 % Oral -- Sitting      BP Location FiO2 (%)     Right arm --       Physical Exam  Vitals and nursing note reviewed.   Constitutional:       General: She is awake. She is not in acute distress.     Appearance: Normal appearance. She is well-developed. She is obese. She is not ill-appearing.   HENT:      Head: Normocephalic and atraumatic.      Right Ear: External ear normal.      Left Ear: External ear normal.      Nose: Nose normal. No congestion or rhinorrhea.      Mouth/Throat:      Mouth: Mucous membranes are moist.      Pharynx: Oropharynx is clear. No posterior oropharyngeal erythema.   Eyes:      General: No  scleral icterus.        Right eye: No discharge.         Left eye: No discharge.      Extraocular Movements: Extraocular movements intact.      Conjunctiva/sclera: Conjunctivae normal.   Cardiovascular:      Rate and Rhythm: Normal rate and regular rhythm.      Pulses: Normal pulses.      Heart sounds: Normal heart sounds. No murmur heard.     No friction rub.   Pulmonary:      Effort: Pulmonary effort is normal. No respiratory distress.      Breath sounds: Normal breath sounds. No stridor. No wheezing or rales.   Abdominal:      General: There is no distension.      Palpations: Abdomen is soft.      Tenderness: There is no abdominal tenderness. There is no right CVA tenderness, left CVA tenderness, guarding or rebound.   Musculoskeletal:         General: No swelling or tenderness.      Cervical back: Normal range of motion and neck supple.      Right lower leg: No edema.      Left lower leg: No edema.   Skin:     General: Skin is warm and dry.      Capillary Refill: Capillary refill takes less than 2 seconds.      Coloration: Skin is not jaundiced or pale.      Findings: No lesion or rash.   Neurological:      General: No focal deficit present.      Mental Status: She is alert and oriented to person, place, and time. Mental status is at baseline.      Cranial Nerves: No cranial nerve deficit.      Sensory: No sensory deficit.      Motor: No weakness.   Psychiatric:         Mood and Affect: Mood normal.         Behavior: Behavior normal. Behavior is cooperative.         Thought Content: Thought content normal.         Judgment: Judgment normal.          DIAGNOSTIC RESULTS     LABS:  Labs Reviewed   CBC WITH AUTO DIFFERENTIAL   COMPREHENSIVE METABOLIC PANEL   MAGNESIUM   TROPONIN I, HIGH SENSITIVITY   SARS-COV-2 PCR   INFLUENZA A AND B PCR   D-DIMER, VTE EXCLUSION   B-TYPE NATRIURETIC PEPTIDE       All other labs were within normal range or not returned as of this dictation.    Imaging  XR chest 2 views    (Results  Pending)        Procedures  Procedures     EMERGENCY DEPARTMENT COURSE/MDM:     ED Course as of 06/01/24 2004   Sat Jun 01, 2024 2000 29-year-old female here with multiple medical complaints.  Overall well-appearing, in no acute distress, mentating appropriate, afebrile and saturating well on room air.  Benign cardiopulmonary exam without clinical signs of DVT, but she does have a history and cannot use PERC rule, given her worsening shortness of breath and no anticoagulation, will use years algorithm to rule out pulmonary malaise him, will obtain chest x-ray, perform cardiac workup, electrolytes, thyroid function, and also test for viral illness given she is having subjective chills.  Her EKG is reassuring, nonischemic without signs of arrhythmia or channel pathology, no signs of pericarditis or myocarditis, if her workup is negative, and reassuring, she can be discharged with primary care follow-up, she may need Holter monitor moving forward. [ND]      ED Course User Index  [ND] Ky Bhakta MD        Medical Decision Making  29-year-old female with a history of HTN, asthma, and PE not on Eliquis presenting to the ED with a complaint of intermittent presyncope.  Patient is afebrile, hemodynamically stable on room air, and in no acute distress.  She appears anxious and is tearful regarding her symptoms.  Episodes of presyncope with shortness of breath and chest pain intermittently for the past month may suggest an endocrine abnormality, intermittent arrhythmia, electrolyte disturbance, or recurrent viral syndrome.  Basic labs, troponin, TSH, D-dimer, BNP, CXR, and beta-hCG quant ordered.    Patient signed out to oncoming provider pending workup and dispo.        Patient and or family in agreement and understanding of treatment plan.  All questions answered.      I reviewed the case with the attending ED physician. The attending ED physician agrees with the plan. Patient and/or patient´s representative was  counseled regarding labs, imaging, likely diagnosis, and plan. All questions were answered.    ED Medications administered this visit:  Medications - No data to display    New Prescriptions from this visit:    New Prescriptions    No medications on file       Follow-up:  No follow-up provider specified.      Final Impression: No diagnosis found.      (Please note that portions of this note were completed with a voice recognition program.  Efforts were made to edit the dictations but occasionally words are mis-transcribed.)     Floyd Wellington MD  Resident  06/01/24 2005       Floyd Wellington MD  Resident  06/01/24 2005

## 2024-06-01 NOTE — PROGRESS NOTES
I received Shefali Arango in signout from outgoing provider.  Please see the previous note for all HPI, PE and MDM up to the time of signout at 1900.    In brief Shefali Arango is an 29 y.o. female presenting for chest pain shortness of breath.  Chief Complaint   Patient presents with    Shortness of Breath   .  At the time of signout we were awaiting: Results of laboratory studies as well as chest x-ray.    I examined the patient at time of assumption of care:  Patient is resting comfortably with hemodynamically normal and stable vital signs      During my care laboratory studies largely unremarkable and nonactionable.  Pertinently D-dimer 292 which is within the criteria of years to rule out pulmonary embolism no necessitation for CT PE at this time.  Especially given resolved symptomatology.  Discussed chest pain shortness of breath return precautions with the patient she expressed understanding.  Gave her Tylenol and Zofran to her indicated pharmacy.  Gave her rapid cardiology follow-up as well as primary care follow-up.  She was discharged in hemodynamically stable and symptomatically improved condition.      Patient seen and discussed with attending of record.    Trent Richard MD

## 2024-06-01 NOTE — Clinical Note
Shefali Arango was seen and treated in our emergency department on 6/1/2024.  She may return to work on 06/01/2024.       If you have any questions or concerns, please don't hesitate to call.      Trent Richard MD

## 2024-06-02 NOTE — DISCHARGE INSTRUCTIONS
You were seen today in the emergency department due to concerns about your chest pain.  Your workup was largely negative however we have given you referral to cardiology they will contact you within 3 business days to set up an appointment.  I have also given you referral to primary care.  I have sent you a prescription for Zofran and Tylenol to your indicated pharmacy.  Please return if you are unable to tolerate oral intake despite appropriate use of Tylenol and your Zofran.  Please return for chest pain shortness of breath.

## 2024-06-03 LAB
ATRIAL RATE: 75 BPM
C TRACH RRNA SPEC QL NAA+PROBE: NEGATIVE
N GONORRHOEA DNA SPEC QL PROBE+SIG AMP: NEGATIVE
P AXIS: 29 DEGREES
P OFFSET: 207 MS
P ONSET: 164 MS
PR INTERVAL: 120 MS
Q ONSET: 224 MS
QRS COUNT: 13 BEATS
QRS DURATION: 72 MS
QT INTERVAL: 386 MS
QTC CALCULATION(BAZETT): 431 MS
QTC FREDERICIA: 415 MS
R AXIS: 58 DEGREES
T AXIS: 38 DEGREES
T OFFSET: 417 MS
VENTRICULAR RATE: 75 BPM

## 2024-06-04 LAB — T VAGINALIS RRNA SPEC QL NAA+PROBE: NEGATIVE

## 2024-06-05 ENCOUNTER — OFFICE VISIT (OUTPATIENT)
Dept: PRIMARY CARE | Facility: CLINIC | Age: 29
End: 2024-06-05
Payer: COMMERCIAL

## 2024-06-05 VITALS
SYSTOLIC BLOOD PRESSURE: 137 MMHG | OXYGEN SATURATION: 99 % | WEIGHT: 293 LBS | TEMPERATURE: 97.3 F | HEIGHT: 63 IN | BODY MASS INDEX: 51.91 KG/M2 | HEART RATE: 78 BPM | DIASTOLIC BLOOD PRESSURE: 89 MMHG

## 2024-06-05 DIAGNOSIS — R11.2 NAUSEA AND VOMITING, UNSPECIFIED VOMITING TYPE: Primary | ICD-10-CM

## 2024-06-05 DIAGNOSIS — Z30.011 ENCOUNTER FOR INITIAL PRESCRIPTION OF CONTRACEPTIVE PILLS: ICD-10-CM

## 2024-06-05 DIAGNOSIS — G47.10 HYPERSOMNIA: ICD-10-CM

## 2024-06-05 DIAGNOSIS — R55 POSTURAL DIZZINESS WITH PRESYNCOPE: ICD-10-CM

## 2024-06-05 DIAGNOSIS — J45.909 MILD ASTHMA, UNSPECIFIED WHETHER COMPLICATED, UNSPECIFIED WHETHER PERSISTENT (HHS-HCC): ICD-10-CM

## 2024-06-05 DIAGNOSIS — R42 POSTURAL DIZZINESS WITH PRESYNCOPE: ICD-10-CM

## 2024-06-05 DIAGNOSIS — E66.01 CLASS 3 SEVERE OBESITY DUE TO EXCESS CALORIES WITH BODY MASS INDEX (BMI) OF 50.0 TO 59.9 IN ADULT, UNSPECIFIED WHETHER SERIOUS COMORBIDITY PRESENT (MULTI): ICD-10-CM

## 2024-06-05 LAB
CHOLEST SERPL-MCNC: 196 MG/DL (ref 0–199)
CHOLESTEROL/HDL RATIO: 4.8
EBV EA IGG SER QL: NEGATIVE
EBV NA AB SER QL: POSITIVE
EBV VCA IGG SER IA-ACNC: POSITIVE
EBV VCA IGM SER IA-ACNC: NEGATIVE
EST. AVERAGE GLUCOSE BLD GHB EST-MCNC: 108 MG/DL
HBA1C MFR BLD: 5.4 %
HDLC SERPL-MCNC: 40.9 MG/DL
LDLC SERPL CALC-MCNC: 141 MG/DL
NON HDL CHOLESTEROL: 155 MG/DL (ref 0–149)
TRIGL SERPL-MCNC: 70 MG/DL (ref 0–149)
VLDL: 14 MG/DL (ref 0–40)

## 2024-06-05 PROCEDURE — 99215 OFFICE O/P EST HI 40 MIN: CPT | Mod: GC | Performed by: INTERNAL MEDICINE

## 2024-06-05 PROCEDURE — 83718 ASSAY OF LIPOPROTEIN: CPT | Performed by: INTERNAL MEDICINE

## 2024-06-05 PROCEDURE — 36415 COLL VENOUS BLD VENIPUNCTURE: CPT | Performed by: INTERNAL MEDICINE

## 2024-06-05 PROCEDURE — 99215 OFFICE O/P EST HI 40 MIN: CPT | Performed by: INTERNAL MEDICINE

## 2024-06-05 PROCEDURE — 86663 EPSTEIN-BARR ANTIBODY: CPT | Performed by: INTERNAL MEDICINE

## 2024-06-05 PROCEDURE — 83036 HEMOGLOBIN GLYCOSYLATED A1C: CPT | Performed by: INTERNAL MEDICINE

## 2024-06-05 PROCEDURE — 1036F TOBACCO NON-USER: CPT | Performed by: INTERNAL MEDICINE

## 2024-06-05 PROCEDURE — 3008F BODY MASS INDEX DOCD: CPT | Performed by: INTERNAL MEDICINE

## 2024-06-05 ASSESSMENT — PAIN SCALES - GENERAL: PAINLEVEL: 0-NO PAIN

## 2024-06-05 NOTE — PROGRESS NOTES
Subjective   Patient ID: Shefali Arango is a 29 y.o. female who presents for [facility].    HPI   Presents to [facility] care. Reporting symptoms of head feeling heavy and week, feeling she will faint. Will have spells where she heats up and feels she will faint but has not lost consciousness. Ongoing for a month. Sensation is worse when straining for BM or passing gas. Nausea/vomiting, last emesis around 8:30 pm last night. Has been having fevers, does not appear to be happening at specific time, checks temp with thermometer reading 100 (pt does not remember exact reading). No triggers for n/v, but worst in mornings. No abd pain. Sometimes feels palpitations. Feels more constipated than usual. Recently had Mirena taken out because she thought it was causing her symptoms. Has not started OCP yet. Occasional headaches. No chest pain, shortness of breath (last took prn albuterol last week, no trigger, was at rest). Has noticed some ankle swelling over the last several months, dependent in nature. No sick contacts. Initially thought symptoms were relsated to UTI/trichomonas infection, but they have not resolved with treatment. No recent COVID infection. No sore throat. Endorses lightheadedness with position changes.     Recently had Mirena out on 5/21. Has some vaginal bleeding 5/3. Did not have periods while Mirena was in.     Past Medical History:   Diagnosis Date    Asthma ([facility])     Bronchitis     DVT (deep vein thrombosis) in pregnancy ([facility])     Essential (primary) hypertension 10/31/2014    Elevated BP    History of IBS     Hx of chlamydia infection     Hx of pulmonary embolus     Morbid (severe) obesity due to excess calories (Multi) 12/16/2019    Morbid obesity with BMI of 50.0-59.9, adult       Past Surgical History:   Procedure Laterality Date    CHOLECYSTECTOMY      GALLBLADDER SURGERY  03/15/2018    Gallbladder Surgery     Current Outpatient Medications   Medication Instructions     "acetaminophen (TYLENOL) 650 mg, oral, Every 6 hours PRN    albuterol 180 mcg, inhalation, Every 6 hours PRN    ondansetron ODT (ZOFRAN-ODT) 4 mg, oral, Every 8 hours PRN    Slynd 4 mg (28) tablet 1 tablet, oral, Daily     Allergies   Allergen Reactions    Ketorolac Other     Other reaction(s): Intolerance    Prochlorperazine Other     Gets restless/anxious    Tramadol Other     Makes her feel like shes having an anxiety attack     Social History     Tobacco Use    Smoking status: Never    Smokeless tobacco: Never   Substance Use Topics    Alcohol use: Not Currently     Alcohol/week: 1.0 - 2.0 standard drink of alcohol     Types: 1 - 2 Glasses of wine per week    Drug use: Yes     Types: Marijuana     Family History   Problem Relation Name Age of Onset    Heart failure Mother      Coronary artery disease Mother      Hypertension Mother      Deep vein thrombosis Mother      Seizures Mother      COPD Mother      Thyroid disease Mother      Colon cancer Maternal Grandmother      Heart failure Maternal Grandfather      Hypertension Maternal Grandfather      COPD Maternal Grandfather      Diabetes Paternal Grandmother      Breast cancer Other           Review of Systems   All other systems reviewed and are negative.      Objective   /77   Pulse 98   Temp 36.3 °C (97.3 °F) (Skin)   Ht 1.6 m (5' 3\")   Wt 136 kg (300 lb 6.4 oz)   LMP  (LMP Unknown) Comment: Negative preg test 5/10/24  SpO2 99%   BMI 53.21 kg/m²     Physical Exam  HENT:      Head: Atraumatic.      Mouth/Throat:      Mouth: Mucous membranes are moist.      Pharynx: Oropharynx is clear.   Eyes:      Extraocular Movements: Extraocular movements intact.      Pupils: Pupils are equal, round, and reactive to light.   Cardiovascular:      Rate and Rhythm: Normal rate and regular rhythm.   Pulmonary:      Effort: Pulmonary effort is normal.      Breath sounds: Normal breath sounds. No wheezing, rhonchi or rales.   Abdominal:      General: Abdomen is " flat. Bowel sounds are normal. There is no distension.      Palpations: Abdomen is soft.      Tenderness: There is no abdominal tenderness. There is no rebound.   Skin:     General: Skin is warm and dry.   Neurological:      General: No focal deficit present.      Mental Status: She is oriented to person, place, and time. Mental status is at baseline.         Assessment/Plan   1-presyncopal symptoms, nausea, vomiting; check orthostats. Check EBV. TSH wnl. CTAP noncon.  2-asthma; on VALENCIA prn, will switch to VALENCIA/ICS prn. Order PFTs.   3-obesity; check A1c, lipids.   4-c/f sleep apnea; STOPBANG 4. Order sleep study.   5-STI screening recently completed, will defer    Health maintenance: UTD on childhood vaccines. PHQ2 negative. STI screening done 6/1. Pap smear 5/31, result pending.

## 2024-06-14 ENCOUNTER — APPOINTMENT (OUTPATIENT)
Dept: RADIOLOGY | Facility: HOSPITAL | Age: 29
End: 2024-06-14
Payer: COMMERCIAL

## 2024-06-14 ENCOUNTER — HOSPITAL ENCOUNTER (INPATIENT)
Facility: HOSPITAL | Age: 29
End: 2024-06-14
Attending: EMERGENCY MEDICINE | Admitting: STUDENT IN AN ORGANIZED HEALTH CARE EDUCATION/TRAINING PROGRAM
Payer: COMMERCIAL

## 2024-06-14 DIAGNOSIS — A49.9 BACTERIAL UTI: ICD-10-CM

## 2024-06-14 DIAGNOSIS — R11.2 NAUSEA AND VOMITING, UNSPECIFIED VOMITING TYPE: ICD-10-CM

## 2024-06-14 DIAGNOSIS — R42 DIZZINESS: ICD-10-CM

## 2024-06-14 DIAGNOSIS — J18.9 COMMUNITY ACQUIRED PNEUMONIA OF LEFT LUNG, UNSPECIFIED PART OF LUNG: Primary | ICD-10-CM

## 2024-06-14 DIAGNOSIS — N94.9 GYNECOLOGIC DISORDER: ICD-10-CM

## 2024-06-14 DIAGNOSIS — G43.101 MIGRAINE WITH AURA AND WITH STATUS MIGRAINOSUS, NOT INTRACTABLE: ICD-10-CM

## 2024-06-14 DIAGNOSIS — R55 POSTURAL DIZZINESS WITH PRESYNCOPE: ICD-10-CM

## 2024-06-14 DIAGNOSIS — N39.0 BACTERIAL UTI: ICD-10-CM

## 2024-06-14 DIAGNOSIS — R42 POSTURAL DIZZINESS WITH PRESYNCOPE: ICD-10-CM

## 2024-06-14 DIAGNOSIS — J18.9 COMMUNITY ACQUIRED PNEUMONIA, UNSPECIFIED LATERALITY: ICD-10-CM

## 2024-06-14 PROBLEM — J15.9 COMMUNITY ACQUIRED BACTERIAL PNEUMONIA: Status: ACTIVE | Noted: 2024-06-14

## 2024-06-14 LAB
ALBUMIN SERPL BCP-MCNC: 3.9 G/DL (ref 3.4–5)
ALP SERPL-CCNC: 66 U/L (ref 33–110)
ALT SERPL W P-5'-P-CCNC: 10 U/L (ref 7–45)
ANION GAP SERPL CALC-SCNC: 14 MMOL/L (ref 10–20)
APPEARANCE UR: ABNORMAL
AST SERPL W P-5'-P-CCNC: 9 U/L (ref 9–39)
BACTERIA #/AREA URNS AUTO: ABNORMAL /HPF
BASOPHILS # BLD AUTO: 0.04 X10*3/UL (ref 0–0.1)
BASOPHILS NFR BLD AUTO: 0.2 %
BILIRUB SERPL-MCNC: 0.5 MG/DL (ref 0–1.2)
BILIRUB UR STRIP.AUTO-MCNC: NEGATIVE MG/DL
BUN SERPL-MCNC: 4 MG/DL (ref 6–23)
CALCIUM SERPL-MCNC: 9.3 MG/DL (ref 8.6–10.6)
CHLORIDE SERPL-SCNC: 101 MMOL/L (ref 98–107)
CO2 SERPL-SCNC: 22 MMOL/L (ref 21–32)
COLOR UR: YELLOW
CREAT SERPL-MCNC: 0.7 MG/DL (ref 0.5–1.05)
CYTOLOGY CMNT CVX/VAG CYTO-IMP: NORMAL
EGFRCR SERPLBLD CKD-EPI 2021: >90 ML/MIN/1.73M*2
EOSINOPHIL # BLD AUTO: 0.02 X10*3/UL (ref 0–0.7)
EOSINOPHIL NFR BLD AUTO: 0.1 %
ERYTHROCYTE [DISTWIDTH] IN BLOOD BY AUTOMATED COUNT: 13.8 % (ref 11.5–14.5)
GLUCOSE SERPL-MCNC: 113 MG/DL (ref 74–99)
GLUCOSE UR STRIP.AUTO-MCNC: NORMAL MG/DL
HCT VFR BLD AUTO: 40.2 % (ref 36–46)
HGB BLD-MCNC: 13.7 G/DL (ref 12–16)
IMM GRANULOCYTES # BLD AUTO: 0.37 X10*3/UL (ref 0–0.7)
IMM GRANULOCYTES NFR BLD AUTO: 2.1 % (ref 0–0.9)
KETONES UR STRIP.AUTO-MCNC: ABNORMAL MG/DL
LAB AP HPV GENOTYPE QUESTION: YES
LAB AP HPV HR: NORMAL
LAB AP PAP ADDITIONAL TESTS: NORMAL
LABORATORY COMMENT REPORT: NORMAL
LEUKOCYTE ESTERASE UR QL STRIP.AUTO: ABNORMAL
LIPASE SERPL-CCNC: 8 U/L (ref 9–82)
LYMPHOCYTES # BLD AUTO: 0.9 X10*3/UL (ref 1.2–4.8)
LYMPHOCYTES NFR BLD AUTO: 5 %
MAGNESIUM SERPL-MCNC: 1.79 MG/DL (ref 1.6–2.4)
MCH RBC QN AUTO: 28 PG (ref 26–34)
MCHC RBC AUTO-ENTMCNC: 34.1 G/DL (ref 32–36)
MCV RBC AUTO: 82 FL (ref 80–100)
MONOCYTES # BLD AUTO: 0.91 X10*3/UL (ref 0.1–1)
MONOCYTES NFR BLD AUTO: 5.1 %
MUCOUS THREADS #/AREA URNS AUTO: ABNORMAL /LPF
NEUTROPHILS # BLD AUTO: 15.66 X10*3/UL (ref 1.2–7.7)
NEUTROPHILS NFR BLD AUTO: 87.5 %
NITRITE UR QL STRIP.AUTO: NEGATIVE
NRBC BLD-RTO: 0 /100 WBCS (ref 0–0)
PATH REPORT.TOTAL CANCER: NORMAL
PH UR STRIP.AUTO: 6 [PH]
PLATELET # BLD AUTO: 306 X10*3/UL (ref 150–450)
POTASSIUM SERPL-SCNC: 3.5 MMOL/L (ref 3.5–5.3)
PREGNANCY TEST URINE, POC: NEGATIVE
PROT SERPL-MCNC: 7.9 G/DL (ref 6.4–8.2)
PROT UR STRIP.AUTO-MCNC: ABNORMAL MG/DL
RBC # BLD AUTO: 4.9 X10*6/UL (ref 4–5.2)
RBC # UR STRIP.AUTO: ABNORMAL /UL
RBC #/AREA URNS AUTO: ABNORMAL /HPF
SODIUM SERPL-SCNC: 133 MMOL/L (ref 136–145)
SP GR UR STRIP.AUTO: 1.02
SQUAMOUS #/AREA URNS AUTO: ABNORMAL /HPF
UROBILINOGEN UR STRIP.AUTO-MCNC: ABNORMAL MG/DL
WBC # BLD AUTO: 17.9 X10*3/UL (ref 4.4–11.3)
WBC #/AREA URNS AUTO: ABNORMAL /HPF

## 2024-06-14 PROCEDURE — 96374 THER/PROPH/DIAG INJ IV PUSH: CPT | Mod: 59

## 2024-06-14 PROCEDURE — G0378 HOSPITAL OBSERVATION PER HR: HCPCS

## 2024-06-14 PROCEDURE — 99285 EMERGENCY DEPT VISIT HI MDM: CPT | Performed by: PHYSICIAN ASSISTANT

## 2024-06-14 PROCEDURE — 83690 ASSAY OF LIPASE: CPT | Performed by: PHYSICIAN ASSISTANT

## 2024-06-14 PROCEDURE — 96365 THER/PROPH/DIAG IV INF INIT: CPT

## 2024-06-14 PROCEDURE — 96368 THER/DIAG CONCURRENT INF: CPT

## 2024-06-14 PROCEDURE — 96361 HYDRATE IV INFUSION ADD-ON: CPT

## 2024-06-14 PROCEDURE — 2550000001 HC RX 255 CONTRASTS: Mod: SE | Performed by: EMERGENCY MEDICINE

## 2024-06-14 PROCEDURE — 96375 TX/PRO/DX INJ NEW DRUG ADDON: CPT

## 2024-06-14 PROCEDURE — 96376 TX/PRO/DX INJ SAME DRUG ADON: CPT

## 2024-06-14 PROCEDURE — 81025 URINE PREGNANCY TEST: CPT | Performed by: PHYSICIAN ASSISTANT

## 2024-06-14 PROCEDURE — 74177 CT ABD & PELVIS W/CONTRAST: CPT | Performed by: RADIOLOGY

## 2024-06-14 PROCEDURE — 36415 COLL VENOUS BLD VENIPUNCTURE: CPT | Performed by: PHYSICIAN ASSISTANT

## 2024-06-14 PROCEDURE — 99285 EMERGENCY DEPT VISIT HI MDM: CPT

## 2024-06-14 PROCEDURE — 2500000004 HC RX 250 GENERAL PHARMACY W/ HCPCS (ALT 636 FOR OP/ED): Mod: SE | Performed by: PHYSICIAN ASSISTANT

## 2024-06-14 PROCEDURE — 74177 CT ABD & PELVIS W/CONTRAST: CPT

## 2024-06-14 PROCEDURE — 80053 COMPREHEN METABOLIC PANEL: CPT | Performed by: PHYSICIAN ASSISTANT

## 2024-06-14 PROCEDURE — 87086 URINE CULTURE/COLONY COUNT: CPT | Performed by: PHYSICIAN ASSISTANT

## 2024-06-14 PROCEDURE — 81001 URINALYSIS AUTO W/SCOPE: CPT | Performed by: PHYSICIAN ASSISTANT

## 2024-06-14 PROCEDURE — 80307 DRUG TEST PRSMV CHEM ANLYZR: CPT | Performed by: STUDENT IN AN ORGANIZED HEALTH CARE EDUCATION/TRAINING PROGRAM

## 2024-06-14 PROCEDURE — 85025 COMPLETE CBC W/AUTO DIFF WBC: CPT | Performed by: PHYSICIAN ASSISTANT

## 2024-06-14 PROCEDURE — 83735 ASSAY OF MAGNESIUM: CPT | Performed by: PHYSICIAN ASSISTANT

## 2024-06-14 RX ORDER — ONDANSETRON HYDROCHLORIDE 2 MG/ML
4 INJECTION, SOLUTION INTRAVENOUS ONCE
Status: COMPLETED | OUTPATIENT
Start: 2024-06-14 | End: 2024-06-14

## 2024-06-14 RX ORDER — ACETAMINOPHEN 325 MG/1
975 TABLET ORAL ONCE
Status: COMPLETED | OUTPATIENT
Start: 2024-06-14 | End: 2024-06-14

## 2024-06-14 RX ORDER — FAMOTIDINE 10 MG/ML
20 INJECTION INTRAVENOUS ONCE
Status: COMPLETED | OUTPATIENT
Start: 2024-06-14 | End: 2024-06-14

## 2024-06-14 RX ORDER — ONDANSETRON HYDROCHLORIDE 2 MG/ML
4 INJECTION, SOLUTION INTRAVENOUS EVERY 8 HOURS PRN
Status: DISCONTINUED | OUTPATIENT
Start: 2024-06-14 | End: 2024-06-15

## 2024-06-14 RX ORDER — CEFTRIAXONE 1 G/50ML
1 INJECTION, SOLUTION INTRAVENOUS ONCE
Status: COMPLETED | OUTPATIENT
Start: 2024-06-14 | End: 2024-06-14

## 2024-06-14 RX ADMIN — SODIUM CHLORIDE 1000 ML: 9 INJECTION, SOLUTION INTRAVENOUS at 21:27

## 2024-06-14 RX ADMIN — ONDANSETRON 4 MG: 2 INJECTION INTRAMUSCULAR; INTRAVENOUS at 20:05

## 2024-06-14 RX ADMIN — SODIUM CHLORIDE, POTASSIUM CHLORIDE, SODIUM LACTATE AND CALCIUM CHLORIDE 1000 ML: 600; 310; 30; 20 INJECTION, SOLUTION INTRAVENOUS at 20:05

## 2024-06-14 RX ADMIN — ACETAMINOPHEN 975 MG: 325 TABLET ORAL at 19:34

## 2024-06-14 RX ADMIN — CEFTRIAXONE SODIUM 1 G: 1 INJECTION, SOLUTION INTRAVENOUS at 21:55

## 2024-06-14 RX ADMIN — IOHEXOL 85 ML: 350 INJECTION, SOLUTION INTRAVENOUS at 21:22

## 2024-06-14 RX ADMIN — AZITHROMYCIN 500 MG: 500 INJECTION, POWDER, LYOPHILIZED, FOR SOLUTION INTRAVENOUS at 21:45

## 2024-06-14 RX ADMIN — FAMOTIDINE 20 MG: 10 INJECTION INTRAVENOUS at 20:31

## 2024-06-14 RX ADMIN — ONDANSETRON 4 MG: 2 INJECTION INTRAMUSCULAR; INTRAVENOUS at 21:28

## 2024-06-14 NOTE — ED TRIAGE NOTES
Pt. Presents to the ED via EMS with complaints of N/V/D on and off for 1 month. Pt. Reports PMH of asthma.

## 2024-06-14 NOTE — ED PROVIDER NOTES
Emergency Department Encounter  [facility] EMERGENCY MEDICINE    Patient: Shefali Arango  MRN: [id number]  : 1995  Date of Evaluation: 2024  ED Provider: Carri Basilio PA-C      Chief Complaint       Chief Complaint   Patient presents with    Vomiting     HPI    Shefali Arango is a 29 y.o. female who presents to the emergency department presenting for persistent nausea and vomiting over the past month.  Patient has been evaluated multiple times by this emergency department and by her PCP.  Has an outpatient CT scan scheduled for 2 weeks from now, however feels that she cannot wait.  States that she has had intermittent nonbilious, nonbloody emesis that does not occur every day over the past month.  Notes that on days that she does not vomit she is having moderate nausea that prevents her from eating.  Is here today because she is now also having a moderate generalized headache and feeling lightheaded.  Denies any known ill contacts or COVID exposures.  Not taking any medications at home for her complaints.  Reports she is also having epigastric pain radiating to the left side of her abdomen that started today as well.  Denies current pregnancy.  Endorses decreased urinary output secondary to decreased oral intake.  No associated chest pain, fevers or chills, changes in hearing or vision, diarrhea, dysuria or hematuria, abnormal vaginal bleeding or discharge, rash or cough.  Status postcholecystectomy. Denies any tobacco and/or alcohol use.    ROS:     Review of Systems  14 systems reviewed and otherwise acutely negative except as in the HPI.    Past History     Past Medical History:   Diagnosis Date    Asthma ([facility])     Bronchitis     DVT (deep vein thrombosis) in pregnancy ([facility]-HCC)     Essential (primary) hypertension 10/31/2014    Elevated BP    History of IBS     Hx of chlamydia infection     Hx of pulmonary embolus     Morbid (severe) obesity due to excess calories  (Multi) 12/16/2019    Morbid obesity with BMI of 50.0-59.9, adult     Past Surgical History:   Procedure Laterality Date    CHOLECYSTECTOMY      GALLBLADDER SURGERY  03/15/2018    Gallbladder Surgery     Social History     Socioeconomic History    Marital status: Single     Spouse name: Not on file    Number of children: Not on file    Years of education: Not on file    Highest education level: Not on file   Occupational History    Not on file   Tobacco Use    Smoking status: Never    Smokeless tobacco: Never   Substance and Sexual Activity    Alcohol use: Not Currently     Alcohol/week: 1.0 - 2.0 standard drink of alcohol     Types: 1 - 2 Glasses of wine per week    Drug use: Yes     Types: Marijuana    Sexual activity: Yes     Birth control/protection: OCP   Other Topics Concern    Not on file   Social History Narrative    Not on file     Social Determinants of Health     Financial Resource Strain: Not on file   Food Insecurity: Not on file   Transportation Needs: Not on file   Physical Activity: Not on file   Stress: Not on file   Social Connections: Not on file   Intimate Partner Violence: Not on file   Housing Stability: Not on file       Medications/Allergies     Previous Medications    ACETAMINOPHEN (TYLENOL) 325 MG TABLET    Take 2 tablets (650 mg) by mouth every 6 hours if needed for mild pain (1 - 3) or fever (temp greater than 38.0 C).    ALBUTEROL 90 MCG/ACTUATION AEROSOL POWDR BREATH ACTIVATED INHALER    Inhale 2 puffs every 6 hours if needed for wheezing.    BUDESONIDE (PULMICORT) 90 MCG/ACTUATION INHALER    Inhale 1 puff 2 times a day as needed (with albuterol for wheezing). Rinse mouth with water after use to reduce aftertaste and incidence of candidiasis. Do not swallow.    ONDANSETRON ODT (ZOFRAN-ODT) 4 MG DISINTEGRATING TABLET    Take 1 tablet (4 mg) by mouth every 8 hours if needed for nausea or vomiting.    SLYND 4 MG (28) TABLET    Take 1 tablet by mouth once daily.     Allergies   Allergen  Reactions    Ketorolac Other     Other reaction(s): Intolerance    Prochlorperazine Other     Gets restless/anxious    Tramadol Other     Makes her feel like shes having an anxiety attack        Physical Exam       ED Triage Vitals [06/14/24 1843]   Temperature Heart Rate Respirations BP   36.8 °C (98.2 °F) (!) 110 18 120/75      Pulse Ox Temp src Heart Rate Source Patient Position   98 % -- -- --      BP Location FiO2 (%)     -- --         Physical Exam    Physical Exam:     VS: As documented in the triage note and EMR flowsheet from this visit were reviewed.    Appearance: Alert, oriented, cooperative, in no acute distress. Well nourished & well hydrated.    Skin: Warm, intact and dry.    Neck: Supple, without meningismus.     Pulmonary: Clear bilaterally with good chest wall excursion. No rales, rhonchi or wheezing. No accessory muscle use or stridor.     Cardiac: Normal S1, S2 without murmur, rub, gallop or extrasystole.     Abdomen: Soft, nontender, active bowel sounds. Negative Rowan's sign. No point tenderness at McBurney's point. No palpable organomegaly. No rebound or guarding. No CVA tenderness.    Musculoskeletal: Spontaneously moving all extremities without limitation. Extremities warm and well-perfused, capillary refill less than 2 seconds. Pulses full and equal.     Neurological:  Cranial nerves II through XII are grossly intact.    Psychiatric: Appropriate mood and affect. Kempt appearance.    Diagnostics   Labs:  Labs Reviewed   CBC WITH AUTO DIFFERENTIAL - Abnormal       Result Value    WBC 17.9 (*)     nRBC 0.0      RBC 4.90      Hemoglobin 13.7      Hematocrit 40.2      MCV 82      MCH 28.0      MCHC 34.1      RDW 13.8      Platelets 306      Neutrophils % 87.5      Immature Granulocytes %, Automated 2.1 (*)     Lymphocytes % 5.0      Monocytes % 5.1      Eosinophils % 0.1      Basophils % 0.2      Neutrophils Absolute 15.66 (*)     Immature Granulocytes Absolute, Automated 0.37      Lymphocytes  Absolute 0.90 (*)     Monocytes Absolute 0.91      Eosinophils Absolute 0.02      Basophils Absolute 0.04     COMPREHENSIVE METABOLIC PANEL - Abnormal    Glucose 113 (*)     Sodium 133 (*)     Potassium 3.5      Chloride 101      Bicarbonate 22      Anion Gap 14      Urea Nitrogen 4 (*)     Creatinine 0.70      eGFR >90      Calcium 9.3      Albumin 3.9      Alkaline Phosphatase 66      Total Protein 7.9      AST 9      Bilirubin, Total 0.5      ALT 10     LIPASE - Abnormal    Lipase 8 (*)     Narrative:     Venipuncture immediately after or during the administration of Metamizole may lead to falsely low results. Testing should be performed immediately prior to Metamizole dosing.   URINALYSIS WITH REFLEX CULTURE AND MICROSCOPIC - Abnormal    Color, Urine Yellow      Appearance, Urine Turbid (*)     Specific Gravity, Urine 1.024      pH, Urine 6.0      Protein, Urine 20 (TRACE)      Glucose, Urine Normal      Blood, Urine 0.2 (2+) (*)     Ketones, Urine TRACE (*)     Bilirubin, Urine NEGATIVE      Urobilinogen, Urine 3 (1+) (*)     Nitrite, Urine NEGATIVE      Leukocyte Esterase, Urine 250 Xin/µL (*)    MICROSCOPIC ONLY, URINE - Abnormal    WBC, Urine 11-20 (*)     RBC, Urine 3-5      Squamous Epithelial Cells, Urine 10-25 (FEW)      Bacteria, Urine 1+ (*)     Mucus, Urine 2+     MAGNESIUM - Normal    Magnesium 1.79     POCT PREGNANCY, URINE - Normal    Preg Test, Ur Negative     URINE CULTURE   URINALYSIS WITH REFLEX CULTURE AND MICROSCOPIC    Narrative:     The following orders were created for panel order Urinalysis with Reflex Culture and Microscopic.  Procedure                               Abnormality         Status                     ---------                               -----------         ------                     Urinalysis with Reflex C...[[id number]]  Abnormal            Final result               Extra Urine Gray Tube[[id number]]                            In process                   Please view  "results for these tests on the individual orders.   EXTRA URINE GRAY TUBE     Radiographs:  CT abdomen pelvis w IV contrast           EKG:      ED Course   Visit Vitals  /75   Pulse (!) 119   Temp 36.8 °C (98.2 °F)   Resp 20   Ht 1.575 m (5' 2\")   Wt 118 kg (260 lb)   SpO2 98%   BMI 47.55 kg/m²   OB Status Implant   Smoking Status Never   BSA 2.27 m²     Medications   sodium chloride 0.9 % bolus 1,000 mL (1,000 mL intravenous New Bag 6/14/24 2127)   azithromycin (Zithromax) in dextrose 5 % in water (D5W) 250 mL  mg (has no administration in time range)   cefTRIAXone (Rocephin) IVPB 1 g (1 g intravenous New Bag 6/14/24 2155)   ondansetron (Zofran) injection 4 mg (4 mg intravenous Given 6/14/24 2005)   famotidine PF (Pepcid) injection 20 mg (20 mg intravenous Given 6/14/24 2031)   lactated Ringer's bolus 1,000 mL (0 mL intravenous Stopped 6/14/24 2102)   acetaminophen (Tylenol) tablet 975 mg (975 mg oral Given 6/14/24 1934)   ondansetron (Zofran) injection 4 mg (4 mg intravenous Given 6/14/24 2128)   iohexol (OMNIPaque) 350 mg iodine/mL solution 85 mL (85 mL intravenous Given 6/14/24 2122)       Medical Decision Making   Started on IVF, IV zofran and pepcid. Pending labs, CT A/P to assess for chronic n/v, new L abd pain. Tachy to 110, although nontoxic appearing on my exam.  Patient has significant leukocytosis of 17.9 with immature granulocytosis of 2.1.  No acute anemia.  Normal renal function.  UA does appear infected.  Negative urine pregnancy.  CT of the abdomen pelvis does reveal focal consolidation of the left lingula, representative of acute community-acquired bacterial pneumonia.  UA cultured.  Patient started on IV azithromycin and Rocephin for her UTI and pneumonia.  Given additional Zofran and a liter of fluid for her persistent nausea, no vomiting here in the emergency department.  Staffed with supervising physician, Hao Gutierrez MD, who agrees with workup and treatment plan.  Admitted to CDU " for IV atbs, IV hydration, advancement of diet.      Final Impression      1. Community acquired pneumonia of left lung, unspecified part of lung    2. Bacterial UTI          DISPOSITION  Disposition: admit to CDU  Patient condition is: Stable    Comment: Please note this report has been produced using speech recognition software and may contain errors related to that system including errors in grammar, punctuation, and spelling, as well as words and phrases that may be inappropriate.  If there are any questions or concerns please feel free to contact the dictating provider for clarification.    CLEMENTINA Olmos PA-C  06/14/24 4334

## 2024-06-15 ENCOUNTER — APPOINTMENT (OUTPATIENT)
Dept: CARDIOLOGY | Facility: HOSPITAL | Age: 29
End: 2024-06-15
Payer: COMMERCIAL

## 2024-06-15 PROBLEM — J18.9 COMMUNITY ACQUIRED PNEUMONIA OF LEFT LUNG, UNSPECIFIED PART OF LUNG: Status: ACTIVE | Noted: 2024-06-15

## 2024-06-15 LAB
AMPHETAMINES UR QL SCN: NORMAL
BARBITURATES UR QL SCN: NORMAL
BENZODIAZ UR QL SCN: NORMAL
BZE UR QL SCN: NORMAL
CANNABINOIDS UR QL SCN: NORMAL
FENTANYL+NORFENTANYL UR QL SCN: NORMAL
HOLD SPECIMEN: NORMAL
METHADONE UR QL SCN: NORMAL
OPIATES UR QL SCN: NORMAL
OXYCODONE+OXYMORPHONE UR QL SCN: NORMAL
PCP UR QL SCN: NORMAL

## 2024-06-15 PROCEDURE — 2500000004 HC RX 250 GENERAL PHARMACY W/ HCPCS (ALT 636 FOR OP/ED)

## 2024-06-15 PROCEDURE — 87899 AGENT NOS ASSAY W/OPTIC: CPT

## 2024-06-15 PROCEDURE — 93010 ELECTROCARDIOGRAM REPORT: CPT | Performed by: INTERNAL MEDICINE

## 2024-06-15 PROCEDURE — 2500000005 HC RX 250 GENERAL PHARMACY W/O HCPCS: Mod: SE | Performed by: PHYSICIAN ASSISTANT

## 2024-06-15 PROCEDURE — 99222 1ST HOSP IP/OBS MODERATE 55: CPT

## 2024-06-15 PROCEDURE — 93005 ELECTROCARDIOGRAM TRACING: CPT

## 2024-06-15 PROCEDURE — 1100000001 HC PRIVATE ROOM DAILY

## 2024-06-15 PROCEDURE — 2500000004 HC RX 250 GENERAL PHARMACY W/ HCPCS (ALT 636 FOR OP/ED): Performed by: PHYSICIAN ASSISTANT

## 2024-06-15 PROCEDURE — 87449 NOS EACH ORGANISM AG IA: CPT

## 2024-06-15 PROCEDURE — 2500000004 HC RX 250 GENERAL PHARMACY W/ HCPCS (ALT 636 FOR OP/ED): Mod: SE

## 2024-06-15 RX ORDER — ACETAMINOPHEN 160 MG/5ML
650 SOLUTION ORAL EVERY 4 HOURS PRN
Status: DISCONTINUED | OUTPATIENT
Start: 2024-06-15 | End: 2024-06-16

## 2024-06-15 RX ORDER — ONDANSETRON 4 MG/1
4 TABLET, ORALLY DISINTEGRATING ORAL ONCE
Status: COMPLETED | OUTPATIENT
Start: 2024-06-15 | End: 2024-06-15

## 2024-06-15 RX ORDER — ONDANSETRON HYDROCHLORIDE 2 MG/ML
4 INJECTION, SOLUTION INTRAVENOUS EVERY 8 HOURS PRN
Status: DISCONTINUED | OUTPATIENT
Start: 2024-06-15 | End: 2024-06-16 | Stop reason: SDUPTHER

## 2024-06-15 RX ORDER — ENOXAPARIN SODIUM 100 MG/ML
40 INJECTION SUBCUTANEOUS EVERY 12 HOURS SCHEDULED
Status: DISCONTINUED | OUTPATIENT
Start: 2024-06-15 | End: 2024-06-20 | Stop reason: HOSPADM

## 2024-06-15 RX ORDER — ONDANSETRON 4 MG/1
4 TABLET, FILM COATED ORAL EVERY 8 HOURS PRN
Status: DISCONTINUED | OUTPATIENT
Start: 2024-06-15 | End: 2024-06-16 | Stop reason: SDUPTHER

## 2024-06-15 RX ORDER — METOCLOPRAMIDE HYDROCHLORIDE 5 MG/ML
10 INJECTION INTRAMUSCULAR; INTRAVENOUS ONCE
Status: DISCONTINUED | OUTPATIENT
Start: 2024-06-15 | End: 2024-06-15

## 2024-06-15 RX ORDER — ONDANSETRON HYDROCHLORIDE 2 MG/ML
4 INJECTION, SOLUTION INTRAVENOUS EVERY 4 HOURS PRN
Status: DISCONTINUED | OUTPATIENT
Start: 2024-06-15 | End: 2024-06-15

## 2024-06-15 RX ORDER — SODIUM CHLORIDE 9 MG/ML
125 INJECTION, SOLUTION INTRAVENOUS CONTINUOUS
Status: DISCONTINUED | OUTPATIENT
Start: 2024-06-15 | End: 2024-06-17

## 2024-06-15 RX ORDER — CEFTRIAXONE 1 G/50ML
1000 INJECTION, SOLUTION INTRAVENOUS EVERY 24 HOURS
Status: DISCONTINUED | OUTPATIENT
Start: 2024-06-15 | End: 2024-06-19

## 2024-06-15 RX ORDER — ACETAMINOPHEN 325 MG/1
650 TABLET ORAL EVERY 4 HOURS PRN
Status: DISCONTINUED | OUTPATIENT
Start: 2024-06-15 | End: 2024-06-16

## 2024-06-15 RX ADMIN — ENOXAPARIN SODIUM 40 MG: 100 INJECTION SUBCUTANEOUS at 23:01

## 2024-06-15 RX ADMIN — ONDANSETRON 4 MG: 4 TABLET, ORALLY DISINTEGRATING ORAL at 11:54

## 2024-06-15 RX ADMIN — CEFTRIAXONE SODIUM 1000 MG: 1 INJECTION, SOLUTION INTRAVENOUS at 23:01

## 2024-06-15 RX ADMIN — SODIUM CHLORIDE 125 ML/HR: 9 INJECTION, SOLUTION INTRAVENOUS at 13:53

## 2024-06-15 RX ADMIN — SODIUM CHLORIDE 125 ML/HR: 9 INJECTION, SOLUTION INTRAVENOUS at 06:16

## 2024-06-15 RX ADMIN — ONDANSETRON 4 MG: 2 INJECTION INTRAMUSCULAR; INTRAVENOUS at 23:02

## 2024-06-15 RX ADMIN — ONDANSETRON 4 MG: 2 INJECTION INTRAMUSCULAR; INTRAVENOUS at 15:02

## 2024-06-15 SDOH — ECONOMIC STABILITY: TRANSPORTATION INSECURITY: IN THE PAST 12 MONTHS, HAS LACK OF TRANSPORTATION KEPT YOU FROM MEDICAL APPOINTMENTS OR FROM GETTING MEDICATIONS?: NO

## 2024-06-15 SDOH — ECONOMIC STABILITY: HOUSING INSECURITY: IN THE LAST 12 MONTHS, WAS THERE A TIME WHEN YOU WERE NOT ABLE TO PAY THE MORTGAGE OR RENT ON TIME?: NO

## 2024-06-15 SDOH — SOCIAL STABILITY: SOCIAL INSECURITY: HAVE YOU HAD ANY THOUGHTS OF HARMING ANYONE ELSE?: NO

## 2024-06-15 SDOH — SOCIAL STABILITY: SOCIAL INSECURITY: WERE YOU ABLE TO COMPLETE ALL THE BEHAVIORAL HEALTH SCREENINGS?: YES

## 2024-06-15 SDOH — SOCIAL STABILITY: SOCIAL INSECURITY: DOES ANYONE TRY TO KEEP YOU FROM HAVING/CONTACTING OTHER FRIENDS OR DOING THINGS OUTSIDE YOUR HOME?: NO

## 2024-06-15 SDOH — SOCIAL STABILITY: SOCIAL INSECURITY: ABUSE: ADULT

## 2024-06-15 SDOH — SOCIAL STABILITY: SOCIAL INSECURITY: DO YOU FEEL UNSAFE GOING BACK TO THE PLACE WHERE YOU ARE LIVING?: NO

## 2024-06-15 SDOH — ECONOMIC STABILITY: TRANSPORTATION INSECURITY
IN THE PAST 12 MONTHS, HAS THE LACK OF TRANSPORTATION KEPT YOU FROM MEDICAL APPOINTMENTS OR FROM GETTING MEDICATIONS?: NO

## 2024-06-15 SDOH — ECONOMIC STABILITY: HOUSING INSECURITY
IN THE LAST 12 MONTHS, WAS THERE A TIME WHEN YOU DID NOT HAVE A STEADY PLACE TO SLEEP OR SLEPT IN A SHELTER (INCLUDING NOW)?: NO

## 2024-06-15 SDOH — SOCIAL STABILITY: SOCIAL INSECURITY: ARE THERE ANY APPARENT SIGNS OF INJURIES/BEHAVIORS THAT COULD BE RELATED TO ABUSE/NEGLECT?: NO

## 2024-06-15 SDOH — ECONOMIC STABILITY: INCOME INSECURITY: IN THE LAST 12 MONTHS, WAS THERE A TIME WHEN YOU WERE NOT ABLE TO PAY THE MORTGAGE OR RENT ON TIME?: NO

## 2024-06-15 SDOH — SOCIAL STABILITY: SOCIAL INSECURITY: HAVE YOU HAD THOUGHTS OF HARMING ANYONE ELSE?: NO

## 2024-06-15 SDOH — SOCIAL STABILITY: SOCIAL INSECURITY: ARE YOU OR HAVE YOU BEEN THREATENED OR ABUSED PHYSICALLY, EMOTIONALLY, OR SEXUALLY BY ANYONE?: NO

## 2024-06-15 SDOH — SOCIAL STABILITY: SOCIAL INSECURITY: DO YOU FEEL ANYONE HAS EXPLOITED OR TAKEN ADVANTAGE OF YOU FINANCIALLY OR OF YOUR PERSONAL PROPERTY?: NO

## 2024-06-15 SDOH — ECONOMIC STABILITY: FOOD INSECURITY: HOW HARD IS IT FOR YOU TO PAY FOR THE VERY BASICS LIKE FOOD, HOUSING, MEDICAL CARE, AND HEATING?: NOT HARD AT ALL

## 2024-06-15 SDOH — ECONOMIC STABILITY: INCOME INSECURITY: HOW HARD IS IT FOR YOU TO PAY FOR THE VERY BASICS LIKE FOOD, HOUSING, MEDICAL CARE, AND HEATING?: NOT HARD AT ALL

## 2024-06-15 SDOH — ECONOMIC STABILITY: TRANSPORTATION INSECURITY
IN THE PAST 12 MONTHS, HAS LACK OF TRANSPORTATION KEPT YOU FROM MEETINGS, WORK, OR FROM GETTING THINGS NEEDED FOR DAILY LIVING?: NO

## 2024-06-15 SDOH — SOCIAL STABILITY: SOCIAL INSECURITY: HAS ANYONE EVER THREATENED TO HURT YOUR FAMILY OR YOUR PETS?: NO

## 2024-06-15 ASSESSMENT — ACTIVITIES OF DAILY LIVING (ADL)
ADEQUATE_TO_COMPLETE_ADL: NO
HEARING - RIGHT EAR: FUNCTIONAL
HEARING - LEFT EAR: FUNCTIONAL
FEEDING YOURSELF: INDEPENDENT
JUDGMENT_ADEQUATE_SAFELY_COMPLETE_DAILY_ACTIVITIES: NO
GROOMING: INDEPENDENT
LACK_OF_TRANSPORTATION: NO
DRESSING YOURSELF: INDEPENDENT
TOILETING: INDEPENDENT
LACK_OF_TRANSPORTATION: NO
PATIENT'S MEMORY ADEQUATE TO SAFELY COMPLETE DAILY ACTIVITIES?: NO
WALKS IN HOME: INDEPENDENT
BATHING: INDEPENDENT

## 2024-06-15 ASSESSMENT — COGNITIVE AND FUNCTIONAL STATUS - GENERAL
DAILY ACTIVITIY SCORE: 24
PATIENT BASELINE BEDBOUND: NO
MOBILITY SCORE: 24

## 2024-06-15 ASSESSMENT — LIFESTYLE VARIABLES
AUDIT-C TOTAL SCORE: 0
HOW MANY STANDARD DRINKS CONTAINING ALCOHOL DO YOU HAVE ON A TYPICAL DAY: PATIENT DOES NOT DRINK
SKIP TO QUESTIONS 9-10: 1
AUDIT-C TOTAL SCORE: 0
HOW OFTEN DO YOU HAVE 6 OR MORE DRINKS ON ONE OCCASION: NEVER
HOW OFTEN DO YOU HAVE A DRINK CONTAINING ALCOHOL: NEVER

## 2024-06-15 ASSESSMENT — PATIENT HEALTH QUESTIONNAIRE - PHQ9
SUM OF ALL RESPONSES TO PHQ9 QUESTIONS 1 & 2: 0
1. LITTLE INTEREST OR PLEASURE IN DOING THINGS: NOT AT ALL
2. FEELING DOWN, DEPRESSED OR HOPELESS: NOT AT ALL

## 2024-06-15 ASSESSMENT — PAIN - FUNCTIONAL ASSESSMENT: PAIN_FUNCTIONAL_ASSESSMENT: 0-10

## 2024-06-15 ASSESSMENT — PAIN SCALES - GENERAL
PAINLEVEL_OUTOF10: 0 - NO PAIN

## 2024-06-15 NOTE — H&P
History and Physical  [facility] CLINICAL DECISION UNIT      MRN: [id number]   Date of Placement in CDU: 6/14/2024   Time Placed in Observation: 10:17pm  ED Provider: Carri Basilio PA-C and Dr. Gutierrez     Limitations to history: None  Independent Historian: patient  External Records Reviewed: EMR, outside records, Care-everywhere      Patient History  Shefali Arango  is a 29 y.o. year-old female with no significant past medical history whom presented to the ED for nausea and vomiting that been occurring over the past month. The acute evaluation while in the ED included CT of the abdomen and pelvis for further evaluation of the nausea and vomiting as well as new left lower quadrant abdominal tenderness.  CT did not show any intra-abdominal pathology however it did show consolidation within the lingula of the left lung that was concerning for pneumonia.  It was elected that the patient be placed in the CDU for IV antibiotics for community-acquired pneumonia since the patient cannot tolerate p.o. intake at this time.  CBC showed elevated leukocyte count of 17.9.  CMP showed elevated glucose of 113 and low sodium of 133.  Lipase was obtained to rule out pancreatitis and was decreased at 8.  Pregnancy test is negative.  UA was obtained and showed 250 leukocytes, 2+ blood, and 1+ bacteria concerning for UTI.  Patient received IV ceftriaxone and azithromycin in the ED for treatment of community-acquired pneumonia and UTI since the patient is unable to tolerate p.o. intake at this time.  Patient also received a bolus of LR for dehydration and Zofran for nausea.    Upon admission to the Clinical Decision Unit, Shefali Arango is hemodynamically stable.  Patient states that she currently feels nauseous has not vomited since she has been in the ED.  However shortly after the patient arrived in the CDU she did vomit in the bathroom.  She attributes the vomiting to the second antibiotic that she received  when she arrived in the CDU.  Patient states she continues to have lower abdominal pain especially on the left side.  Patient states that she does have a history of being unable to take pills.  Patient states that she throws up when she tries to take a pill.  She denies chest pain, shortness of breath, fevers.      Past Medical History: reviewed, see EMR and HPI  Past Surgical History: reviewed, see EMR  Social History: No tobacco use. Denies EtOH and illict substance use.  Family History: Non-contributory      Medications: reviewed      Scheduled medications      -         Continuous medications      - sodium chloride 0.9%, 125 mL/hr, Last Rate: 125 mL/hr (06/15/24 0616)         PRN medications      - PRN medications: ondansetron       Review of Systems: A 14 pt ROS was completed and is otherwise negative unless mentioned above.      Physical Examination  VS reviewed and noted NAD. Afebrile.   General: Patient is awake, conversant, well-nourished and overall well-appearing.    Head: NC/AT. No apparent traumatic injuries.   Eyes: EOMI, sclerae anicteric    ENT: Hearing grossly intact. Normal phonation without stridor, drooling or trismus.   Neck: Supple, full ROM without meningismus. No lymphadenopathy.   Cardiac: Regular rate & rhythm. No murmur, gallops or rubs.    Lungs: CTAB with normal respiratory effort and good aeration throughout. No accessory muscle usage or adventitious breath sounds. Chest wall is non-tender to palpation.    Abdomen: Soft, left lower quadrant tenderness, nonsurgical. No masses. No rebound, rigidity or guarding. Normoactive BS   MSK: Full ROM intact. Symmetric muscle bulk without step-offs or gross deformity.   Vascular: Pulses full and equal bilaterally. No cyanosis, clubbing or pitting LE edema. Capillary refill < 2s   Skin: Warm and intact without rashes, lesions or discoloration. Turgor is good.   Neuro: CN II-XII grossly intact. A&Ox3. Speech is clear and fluent. No focal deficits  identified.   Psychiatric: Mood and affect are appropriate for situation.      Diagnostic Evaluation  Diagnostic studies and ED interventions germane to this period of clinical observation will include:   Labs Reviewed   CBC WITH AUTO DIFFERENTIAL - Abnormal       Result Value    WBC 17.9 (*)     nRBC 0.0      RBC 4.90      Hemoglobin 13.7      Hematocrit 40.2      MCV 82      MCH 28.0      MCHC 34.1      RDW 13.8      Platelets 306      Neutrophils % 87.5      Immature Granulocytes %, Automated 2.1 (*)     Lymphocytes % 5.0      Monocytes % 5.1      Eosinophils % 0.1      Basophils % 0.2      Neutrophils Absolute 15.66 (*)     Immature Granulocytes Absolute, Automated 0.37      Lymphocytes Absolute 0.90 (*)     Monocytes Absolute 0.91      Eosinophils Absolute 0.02      Basophils Absolute 0.04     COMPREHENSIVE METABOLIC PANEL - Abnormal    Glucose 113 (*)     Sodium 133 (*)     Potassium 3.5      Chloride 101      Bicarbonate 22      Anion Gap 14      Urea Nitrogen 4 (*)     Creatinine 0.70      eGFR >90      Calcium 9.3      Albumin 3.9      Alkaline Phosphatase 66      Total Protein 7.9      AST 9      Bilirubin, Total 0.5      ALT 10     LIPASE - Abnormal    Lipase 8 (*)     Narrative:     Venipuncture immediately after or during the administration of Metamizole may lead to falsely low results. Testing should be performed immediately prior to Metamizole dosing.   URINALYSIS WITH REFLEX CULTURE AND MICROSCOPIC - Abnormal    Color, Urine Yellow      Appearance, Urine Turbid (*)     Specific Gravity, Urine 1.024      pH, Urine 6.0      Protein, Urine 20 (TRACE)      Glucose, Urine Normal      Blood, Urine 0.2 (2+) (*)     Ketones, Urine TRACE (*)     Bilirubin, Urine NEGATIVE      Urobilinogen, Urine 3 (1+) (*)     Nitrite, Urine NEGATIVE      Leukocyte Esterase, Urine 250 Xin/µL (*)    MICROSCOPIC ONLY, URINE - Abnormal    WBC, Urine 11-20 (*)     RBC, Urine 3-5      Squamous Epithelial Cells, Urine 10-25 (FEW)       Bacteria, Urine 1+ (*)     Mucus, Urine 2+     MAGNESIUM - Normal    Magnesium 1.79     POCT PREGNANCY, URINE - Normal    Preg Test, Ur Negative     URINE CULTURE   URINALYSIS WITH REFLEX CULTURE AND MICROSCOPIC    Narrative:     The following orders were created for panel order Urinalysis with Reflex Culture and Microscopic.                  Procedure                               Abnormality         Status                                     ---------                               -----------         ------                                     Urinalysis with Reflex C...[[id number]]  Abnormal            Final result                               Extra Urine Gray Tube[[id number]]                            Final result                                                 Please view results for these tests on the individual orders.   EXTRA URINE GRAY TUBE    Extra Tube Hold for add-ons.           Consultants (if applicable)  1) none      Impression and Plan  In summary, Shefali Arango was admitted to the [facility] Emergency Medicine Clinical Decision Unit for community acquired pneumonia. Dr. Gutierrez is the CDU admission attending.    This patient has been risk-stratified based on available history, physical exam, and related study findings. Admission to the observation status for further diagnosis/treatment/monitoring of community-acquired pneumonia is warranted clinically. This extended period of observation is specifically required to determine the need for hospitalization.     The goals of this admission based on the patient´s clinical problem list are:  1) Community acquired bacterial pneumonia      -- IV 1g ceftriaxone and 500mg azithromycin given in the ED     --Plan to switch to oral antibiotics tomorrow once the able to tolerate p.o. intake  2) nausea and vomiting     -- IV maintenance fluids     -- P.o. challenge    We will observe the patient for the following endpoints:   1) stable vital signs  2)  tolerate p.o. intake and oral antibiotics    When met, appropriate disposition will be arranged    Tomás Mcdonald PA-C  [facility]  Emergency Department

## 2024-06-15 NOTE — PROGRESS NOTES
Disposition Note  Clinical Decision Unit   Patient: Shefali Arango  MRN: [id number]  : 1995  Encounter Date: 2024  CDU Provider: Yaakov Meadows PA-C      Subjective:    Shefali Arango is a 29 y.o. female has undergone comprehensive diagnostic evaluation and therapeutic management in accordance with the CDU guidelines for pneumonia/UTI/nausea/vomiting.  Based on the patient's clinical response and diagnostic information during this period of observation, it has been determined that the patient will be admitted to the hospital.    This is a 29-year-old female with no significant past medical history who presented for month of nausea and vomiting.  ED workup including CT of the abdomen and pelvis that revealed no intra-abdominal pathology, however noted lingula of the left lung consolidation concerning for pneumonia.  The patient was also found to have a UTI.  She had leukocytosis at 17.9.  The patient was treated with IV Rocephin and azithromycin.  She was unable to pass p.o. challenge despite multiple attempts with IV antiemetics.    No concern for septic process at this point.  The patient was admitted under medicine service for further management.    Orders Placed This Encounter   Procedures    Urine Culture    CT abdomen pelvis w IV contrast    CBC and Auto Differential    Comprehensive metabolic panel    Lipase    Magnesium    Urinalysis with Reflex Culture and Microscopic    Urinalysis with Reflex Culture and Microscopic    Extra Urine Gray Tube    Microscopic Only, Urine    Adult diet Regular    POCT pregnancy, urine    Insert and maintain peripheral IV    Send to CDU    Initiate observation status    ED to floor bed request       Results for orders placed or performed during the hospital encounter of 24   CBC and Auto Differential   Result Value Ref Range    WBC 17.9 (H) 4.4 - 11.3 x10*3/uL    nRBC 0.0 0.0 - 0.0 /100 WBCs    RBC 4.90 4.00 - 5.20 x10*6/uL    Hemoglobin 13.7 12.0 - 16.0 g/dL     Hematocrit 40.2 36.0 - 46.0 %    MCV 82 80 - 100 fL    MCH 28.0 26.0 - 34.0 pg    MCHC 34.1 32.0 - 36.0 g/dL    RDW 13.8 11.5 - 14.5 %    Platelets 306 150 - 450 x10*3/uL    Neutrophils % 87.5 40.0 - 80.0 %    Immature Granulocytes %, Automated 2.1 (H) 0.0 - 0.9 %    Lymphocytes % 5.0 13.0 - 44.0 %    Monocytes % 5.1 2.0 - 10.0 %    Eosinophils % 0.1 0.0 - 6.0 %    Basophils % 0.2 0.0 - 2.0 %    Neutrophils Absolute 15.66 (H) 1.20 - 7.70 x10*3/uL    Immature Granulocytes Absolute, Automated 0.37 0.00 - 0.70 x10*3/uL    Lymphocytes Absolute 0.90 (L) 1.20 - 4.80 x10*3/uL    Monocytes Absolute 0.91 0.10 - 1.00 x10*3/uL    Eosinophils Absolute 0.02 0.00 - 0.70 x10*3/uL    Basophils Absolute 0.04 0.00 - 0.10 x10*3/uL   Comprehensive metabolic panel   Result Value Ref Range    Glucose 113 (H) 74 - 99 mg/dL    Sodium 133 (L) 136 - 145 mmol/L    Potassium 3.5 3.5 - 5.3 mmol/L    Chloride 101 98 - 107 mmol/L    Bicarbonate 22 21 - 32 mmol/L    Anion Gap 14 10 - 20 mmol/L    Urea Nitrogen 4 (L) 6 - 23 mg/dL    Creatinine 0.70 0.50 - 1.05 mg/dL    eGFR >90 >60 mL/min/1.73m*2    Calcium 9.3 8.6 - 10.6 mg/dL    Albumin 3.9 3.4 - 5.0 g/dL    Alkaline Phosphatase 66 33 - 110 U/L    Total Protein 7.9 6.4 - 8.2 g/dL    AST 9 9 - 39 U/L    Bilirubin, Total 0.5 0.0 - 1.2 mg/dL    ALT 10 7 - 45 U/L   Lipase   Result Value Ref Range    Lipase 8 (L) 9 - 82 U/L   Magnesium   Result Value Ref Range    Magnesium 1.79 1.60 - 2.40 mg/dL   Urinalysis with Reflex Culture and Microscopic   Result Value Ref Range    Color, Urine Yellow Light-Yellow, Yellow, Dark-Yellow    Appearance, Urine Turbid (N) Clear    Specific Gravity, Urine 1.024 1.005 - 1.035    pH, Urine 6.0 5.0, 5.5, 6.0, 6.5, 7.0, 7.5, 8.0    Protein, Urine 20 (TRACE) NEGATIVE, 10 (TRACE), 20 (TRACE) mg/dL    Glucose, Urine Normal Normal mg/dL    Blood, Urine 0.2 (2+) (A) NEGATIVE    Ketones, Urine TRACE (A) NEGATIVE mg/dL    Bilirubin, Urine NEGATIVE NEGATIVE    Urobilinogen, Urine  3 (1+) (A) Normal mg/dL    Nitrite, Urine NEGATIVE NEGATIVE    Leukocyte Esterase, Urine 250 Xin/µL (A) NEGATIVE   Extra Urine Gray Tube   Result Value Ref Range    Extra Tube Hold for add-ons.    Microscopic Only, Urine   Result Value Ref Range    WBC, Urine 11-20 (A) 1-5, NONE /HPF    RBC, Urine 3-5 NONE, 1-2, 3-5 /HPF    Squamous Epithelial Cells, Urine 10-25 (FEW) Reference range not established. /HPF    Bacteria, Urine 1+ (A) NONE SEEN /HPF    Mucus, Urine 2+ Reference range not established. /LPF   POCT pregnancy, urine   Result Value Ref Range    Preg Test, Ur Negative Negative            Past History     Past Medical History:   Diagnosis Date    Asthma ([facility])     Bronchitis     DVT (deep vein thrombosis) in pregnancy ([facility])     Essential (primary) hypertension 10/31/2014    Elevated BP    History of IBS     Hx of chlamydia infection     Hx of pulmonary embolus     Morbid (severe) obesity due to excess calories (Multi) 12/16/2019    Morbid obesity with BMI of 50.0-59.9, adult     Past Surgical History:   Procedure Laterality Date    CHOLECYSTECTOMY      GALLBLADDER SURGERY  03/15/2018    Gallbladder Surgery     Social History     Socioeconomic History    Marital status: Single     Spouse name: Not on file    Number of children: Not on file    Years of education: Not on file    Highest education level: Not on file   Occupational History    Not on file   Tobacco Use    Smoking status: Never    Smokeless tobacco: Never   Substance and Sexual Activity    Alcohol use: Not Currently     Alcohol/week: 1.0 - 2.0 standard drink of alcohol     Types: 1 - 2 Glasses of wine per week    Drug use: Yes     Types: Marijuana    Sexual activity: Yes     Birth control/protection: OCP   Other Topics Concern    Not on file   Social History Narrative    Not on file     Social Determinants of Health     Financial Resource Strain: Not on file   Food Insecurity: Not on file   Transportation Needs: Not on file   Physical  Activity: Not on file   Stress: Not on file   Social Connections: Not on file   Intimate Partner Violence: Not on file   Housing Stability: Not on file         Medications/Allergies     Previous Medications    ACETAMINOPHEN (TYLENOL) 325 MG TABLET    Take 2 tablets (650 mg) by mouth every 6 hours if needed for mild pain (1 - 3) or fever (temp greater than 38.0 C).    ALBUTEROL 90 MCG/ACTUATION AEROSOL POWDR BREATH ACTIVATED INHALER    Inhale 2 puffs every 6 hours if needed for wheezing.    BUDESONIDE (PULMICORT) 90 MCG/ACTUATION INHALER    Inhale 1 puff 2 times a day as needed (with albuterol for wheezing). Rinse mouth with water after use to reduce aftertaste and incidence of candidiasis. Do not swallow.    ONDANSETRON ODT (ZOFRAN-ODT) 4 MG DISINTEGRATING TABLET    Take 1 tablet (4 mg) by mouth every 8 hours if needed for nausea or vomiting.    SLYND 4 MG (28) TABLET    Take 1 tablet by mouth once daily.     Allergies   Allergen Reactions    Ketorolac Other     Other reaction(s): Intolerance    Prochlorperazine Other     Gets restless/anxious    Tramadol Other     Makes her feel like shes having an anxiety attack         Review of Systems  All systems reviewed and otherwise negative, except as stated above in HPI.    Diagnostics reviewed by Yaakov Meadows PA-C     Labs:  Results for orders placed or performed during the hospital encounter of 06/14/24   CBC and Auto Differential   Result Value Ref Range    WBC 17.9 (H) 4.4 - 11.3 x10*3/uL    nRBC 0.0 0.0 - 0.0 /100 WBCs    RBC 4.90 4.00 - 5.20 x10*6/uL    Hemoglobin 13.7 12.0 - 16.0 g/dL    Hematocrit 40.2 36.0 - 46.0 %    MCV 82 80 - 100 fL    MCH 28.0 26.0 - 34.0 pg    MCHC 34.1 32.0 - 36.0 g/dL    RDW 13.8 11.5 - 14.5 %    Platelets 306 150 - 450 x10*3/uL    Neutrophils % 87.5 40.0 - 80.0 %    Immature Granulocytes %, Automated 2.1 (H) 0.0 - 0.9 %    Lymphocytes % 5.0 13.0 - 44.0 %    Monocytes % 5.1 2.0 - 10.0 %    Eosinophils % 0.1 0.0 - 6.0 %    Basophils % 0.2  0.0 - 2.0 %    Neutrophils Absolute 15.66 (H) 1.20 - 7.70 x10*3/uL    Immature Granulocytes Absolute, Automated 0.37 0.00 - 0.70 x10*3/uL    Lymphocytes Absolute 0.90 (L) 1.20 - 4.80 x10*3/uL    Monocytes Absolute 0.91 0.10 - 1.00 x10*3/uL    Eosinophils Absolute 0.02 0.00 - 0.70 x10*3/uL    Basophils Absolute 0.04 0.00 - 0.10 x10*3/uL   Comprehensive metabolic panel   Result Value Ref Range    Glucose 113 (H) 74 - 99 mg/dL    Sodium 133 (L) 136 - 145 mmol/L    Potassium 3.5 3.5 - 5.3 mmol/L    Chloride 101 98 - 107 mmol/L    Bicarbonate 22 21 - 32 mmol/L    Anion Gap 14 10 - 20 mmol/L    Urea Nitrogen 4 (L) 6 - 23 mg/dL    Creatinine 0.70 0.50 - 1.05 mg/dL    eGFR >90 >60 mL/min/1.73m*2    Calcium 9.3 8.6 - 10.6 mg/dL    Albumin 3.9 3.4 - 5.0 g/dL    Alkaline Phosphatase 66 33 - 110 U/L    Total Protein 7.9 6.4 - 8.2 g/dL    AST 9 9 - 39 U/L    Bilirubin, Total 0.5 0.0 - 1.2 mg/dL    ALT 10 7 - 45 U/L   Lipase   Result Value Ref Range    Lipase 8 (L) 9 - 82 U/L   Magnesium   Result Value Ref Range    Magnesium 1.79 1.60 - 2.40 mg/dL   Urinalysis with Reflex Culture and Microscopic   Result Value Ref Range    Color, Urine Yellow Light-Yellow, Yellow, Dark-Yellow    Appearance, Urine Turbid (N) Clear    Specific Gravity, Urine 1.024 1.005 - 1.035    pH, Urine 6.0 5.0, 5.5, 6.0, 6.5, 7.0, 7.5, 8.0    Protein, Urine 20 (TRACE) NEGATIVE, 10 (TRACE), 20 (TRACE) mg/dL    Glucose, Urine Normal Normal mg/dL    Blood, Urine 0.2 (2+) (A) NEGATIVE    Ketones, Urine TRACE (A) NEGATIVE mg/dL    Bilirubin, Urine NEGATIVE NEGATIVE    Urobilinogen, Urine 3 (1+) (A) Normal mg/dL    Nitrite, Urine NEGATIVE NEGATIVE    Leukocyte Esterase, Urine 250 Xin/µL (A) NEGATIVE   Extra Urine Gray Tube   Result Value Ref Range    Extra Tube Hold for add-ons.    Microscopic Only, Urine   Result Value Ref Range    WBC, Urine 11-20 (A) 1-5, NONE /HPF    RBC, Urine 3-5 NONE, 1-2, 3-5 /HPF    Squamous Epithelial Cells, Urine 10-25 (FEW) Reference  "range not established. /HPF    Bacteria, Urine 1+ (A) NONE SEEN /HPF    Mucus, Urine 2+ Reference range not established. /LPF   POCT pregnancy, urine   Result Value Ref Range    Preg Test, Ur Negative Negative     Radiographs:  CT abdomen pelvis w IV contrast   Final Result   1. Incompletely visualized consolidative opacity within the lingula   concerning for pneumonia. Follow-up to resolution recommended.   2. No acute abdominopelvic pathology.        I personally reviewed the images/study and I agree with the findings   as stated above by resident physician, Dr. Hi Rees.        MACRO:   none        Signed by: Flip Toney 6/14/2024 10:12 PM   Dictation workstation:   [id number]              Physical Exam     Visit Vitals  /78   Pulse 82   Temp 36.8 °C (98.3 °F) (Oral)   Resp 16   Ht 1.575 m (5' 2\")   Wt 118 kg (260 lb)   SpO2 99%   BMI 47.55 kg/m²   OB Status Implant   Smoking Status Never   BSA 2.27 m²       GENERAL:  The patient appears nourished and normally developed. Vital signs as documented.     HEENT:  Head normocephalic, atraumatic, EOMs intact, PERRLA, Mucous membranes moist. Nares patent without copious rhinorrhea.  No lymphadenopathy.    PULMONARY:  Lungs are clear to auscultation, without any respiratory distress. Able to speak full sentences, no accessory muscle use    CARDIAC:   Normal rate. No murmurs, rubs or gallops    ABDOMEN:  Soft, non-distended, non-tender, BS positive x 4 quadrants, No rebound or guarding, no peritoneal signs, no CVA tenderness, no masses or organomegaly    MUSCULOSKELETAL:   Able to ambulate, Non edematous, with no obvious deformities. Pulses intact distal    SKIN:   Good color, with no significant rashes.  No pallor.    NEURO:  No obvious neurological deficits, normal sensation and strength bilaterally.  Able to follow commands.    Psych: Appropriate mood and affect    Consultants  1) N/A      Impression and Plan    In summary, Shefali Arango has been " cared for according to the standard [facility] for Emergency Medicine Clinical Decision Unit observation protocol for Community acquired bacterial pneumonia. This extended period of observation was specifically required to determine the need for hospitalization. Prior to discharge from observation, the final physical exam is documented above. I have reviewed the results of the labs and imaging that were performed in the ED as well as the CDU.      Significant events during the course of observation based on the goals of the clinical problem list include:   1) UTI/left lingula pneumonia, treated with IV Rocephin and azithromycin  2) unable to pass p.o. challenge despite IV fluids/antiemetics.  Admitted for further management.    Based on the patient's condition and test results, the patient will be admitted      Dr. Clinton is the CDU disposition attending.    Discharge Meds     Your medication list        ASK your doctor about these medications        Instructions Last Dose Given Next Dose Due   acetaminophen 325 mg tablet  Commonly known as: TylenoL      Take 2 tablets (650 mg) by mouth every 6 hours if needed for mild pain (1 - 3) or fever (temp greater than 38.0 C).       albuterol 90 mcg/actuation aerosol powdr breath activated inhaler           budesonide 90 mcg/actuation inhaler  Commonly known as: Pulmicort      Inhale 1 puff 2 times a day as needed (with albuterol for wheezing). Rinse mouth with water after use to reduce aftertaste and incidence of candidiasis. Do not swallow.       ondansetron ODT 4 mg disintegrating tablet  Commonly known as: Zofran-ODT      Take 1 tablet (4 mg) by mouth every 8 hours if needed for nausea or vomiting.       Slynd 4 mg (28) tablet  Generic drug: drospirenone (contraceptive)      Take 1 tablet by mouth once daily.                Test Results Pending At Discharge  Pending Labs       Order Current Status    Urine Culture In process            Outpatient Follow-Up  Future  Appointments   Date Time Provider Department Center   6/25/2024  1:30 PM [facility] [id number] PFT RM 2 [id number] Academic   6/26/2024  1:30 PM [facility] SCC CT 1 [id number] [facility] [place]   7/18/2024  1:00 PM Austen Hdez MD [id number] Academic   7/31/2024  3:20 PM Katja Jack MD [id number] Academic         Yaakov Meadows PA-C   Emergency Medicine/Clinical Decision Unit   [facility]

## 2024-06-15 NOTE — H&P
History Of Present Illness  Shefali Arango is a 29 y.o. female presenting with nausea/vomiting.    A 30 y/o F patient with PMHX of Asthma, DVT/PE (s/p eliquis at age of 26), IBS, HTN, who presents with nausea/vomiting of 1 month duration.     The patient reported that she has the symptoms of nausea/vomiting for about 1 month, she was evaluated multiple times for the reason and was unable to identify what was going on with her, she also reported occasional diarrhea worse with food intake as well as low appetite. She also reported dry cough associated with left sided chest wall pain worse with deep breaths, cough, and tender to palpation of 2-3 days duration. She reported feverish sensation and chills as well. She denied urinary symptoms but reported she is being told she has a UTI. She reported asscoaited dizziness and pre-syncope with the symptoms mentioned above    She denied any sob, abdominal pain, flank pain, back pain, chest pressure, palpitations, and rest of ROS was unremarkable     SH:  Denies smoking, alcohol or illicit drug use    FH:  Mother with heart failure    PMHx: As above  PSHx: As below     Course:   Vitals:    06/15/24 1610   BP: 99/62   Pulse: 80   Resp: 17   Temp: 36.3 °C (97.3 °F)   SpO2: 99%       Labs remarkable for WBC 17.9 K and neuts 87.5%   U/A WBC 11-20, , Urine cx obtained  CT Abd/pelvis:   IMPRESSION:  1. Incompletely visualized consolidative opacity within the lingula  concerning for pneumonia. Follow-up to resolution recommended.  2. No acute abdominopelvic pathology.    The patient was admitted to CDU treated with IV Rocephin and azithromycin  unable to pass p.o. challenge despite IV fluids/antiemetics.  Admitted for further management.     Past Medical History  Past Medical History:   Diagnosis Date    Asthma ([facility])     Bronchitis     DVT (deep vein thrombosis) in pregnancy ([facility])     Essential (primary) hypertension 10/31/2014    Elevated BP    History of IBS     Hx  of chlamydia infection     Hx of pulmonary embolus     Morbid (severe) obesity due to excess calories (Multi) 12/16/2019    Morbid obesity with BMI of 50.0-59.9, adult       Surgical History  Past Surgical History:   Procedure Laterality Date    CHOLECYSTECTOMY      GALLBLADDER SURGERY  03/15/2018    Gallbladder Surgery        Social History  She reports that she has never smoked. She has never used smokeless tobacco. She reports that she does not currently use alcohol after a past usage of about 1.0 - 2.0 standard drink of alcohol per week. She reports current drug use. Drug: Marijuana.    Family History  Family History   Problem Relation Name Age of Onset    Heart failure Mother      Coronary artery disease Mother      Hypertension Mother      Deep vein thrombosis Mother      Seizures Mother      COPD Mother      Thyroid disease Mother      Colon cancer Maternal Grandmother      Heart failure Maternal Grandfather      Hypertension Maternal Grandfather      COPD Maternal Grandfather      Diabetes Paternal Grandmother      Breast cancer Other          Allergies  Ketorolac, Prochlorperazine, and Tramadol    Review of Systems  10 system ROS was obtained and is negative unless otherwise mentioned above      Physical Exam  Constitutional:       Appearance: Normal appearance.   HENT:      Head: Normocephalic and atraumatic.   Eyes:      Extraocular Movements: Extraocular movements intact.      Pupils: Pupils are equal, round, and reactive to light.   Cardiovascular:      Rate and Rhythm: Normal rate and regular rhythm.      Heart sounds: No murmur heard.     No gallop.   Pulmonary:      Effort: Pulmonary effort is normal. No respiratory distress.      Breath sounds: No wheezing or rales.   Chest:      Chest wall: Tenderness present.   Abdominal:      General: Bowel sounds are normal.      Palpations: Abdomen is soft.      Tenderness: There is no abdominal tenderness. There is no guarding or rebound.   Skin:     General:  "Skin is warm and dry.   Neurological:      General: No focal deficit present.      Mental Status: She is alert and oriented to person, place, and time. Mental status is at baseline.   Psychiatric:         Mood and Affect: Mood normal.         Behavior: Behavior normal.          Last Recorded Vitals  Blood pressure 99/62, pulse 80, temperature 36.3 °C (97.3 °F), temperature source Temporal, resp. rate 17, height 1.575 m (5' 2\"), weight 118 kg (260 lb), SpO2 99%.    Relevant Results        Scheduled medications  azithromycin, 500 mg, intravenous, q24h  cefTRIAXone, 1,000 mg, intravenous, q24h      Continuous medications  sodium chloride 0.9%, 125 mL/hr, Last Rate: 125 mL/hr (06/15/24 1353)      PRN medications  PRN medications: [Transfer Hold] ondansetron       Assessment/Plan   Principal Problem:    Community acquired bacterial pneumonia  Active Problems:    Community acquired pneumonia, unspecified laterality      A 30 y/o F patient with PMHX of Asthma, DVT/PE (s/p eliquis at age of 26), IBS, HTN, who presents with nausea/vomiting of 1 month duration. Labs remarkable for WBC 17.9 K and neuts 87.5%  U/A WBC 11-20, , Urine cx obtained CT Abd/pelvis: showed Incompletely visualized consolidative opacity within the lingula concerning for pneumonia. The patient was admitted to CDU treated with IV Rocephin and azithromycin unable to pass p.o. challenge despite IV fluids/antiemetics.  Admitted for further management.    # Leukocytosis likely 2/2 CAP vs UTI   # Chest wall pain likely pleuritic 2/2 CAP  # Nausea/vomiting likely 2/2 systemic infection (CAP vs UTI)   - Admit to medicine  - Monitor VS  - Keep O2 sat >92%  - Strep ag  - Leg ag  - MRSA swab   - Sputum cx  - Follow urine cx  - Continue with ceftriaxone/azithro x5 days   - Tylenol for pain control  - Zofran prn     F: prn   E: prn   N: Regular  A: PIV   DVT Ppx: Lovenox sq  Code status: Full code  NOK:   Sister Anita cook [phone] "                 Ashlee Tsang MD

## 2024-06-15 NOTE — CARE PLAN
The patient's goals for the shift include      The clinical goals for the shift include decrease nausea

## 2024-06-16 VITALS
TEMPERATURE: 97.9 F | SYSTOLIC BLOOD PRESSURE: 126 MMHG | HEIGHT: 62 IN | OXYGEN SATURATION: 99 % | BODY MASS INDEX: 47.84 KG/M2 | WEIGHT: 260 LBS | RESPIRATION RATE: 18 BRPM | HEART RATE: 90 BPM | DIASTOLIC BLOOD PRESSURE: 82 MMHG

## 2024-06-16 LAB
ANION GAP SERPL CALC-SCNC: 13 MMOL/L (ref 10–20)
BACTERIA UR CULT: NORMAL
BUN SERPL-MCNC: 4 MG/DL (ref 6–23)
CALCIUM SERPL-MCNC: 9.1 MG/DL (ref 8.6–10.6)
CHLORIDE SERPL-SCNC: 102 MMOL/L (ref 98–107)
CO2 SERPL-SCNC: 27 MMOL/L (ref 21–32)
CREAT SERPL-MCNC: 0.73 MG/DL (ref 0.5–1.05)
EGFRCR SERPLBLD CKD-EPI 2021: >90 ML/MIN/1.73M*2
ERYTHROCYTE [DISTWIDTH] IN BLOOD BY AUTOMATED COUNT: 13.6 % (ref 11.5–14.5)
GLUCOSE SERPL-MCNC: 126 MG/DL (ref 74–99)
HCT VFR BLD AUTO: 38.6 % (ref 36–46)
HGB BLD-MCNC: 12.8 G/DL (ref 12–16)
LEGIONELLA AG UR QL: NEGATIVE
MCH RBC QN AUTO: 28.2 PG (ref 26–34)
MCHC RBC AUTO-ENTMCNC: 33.2 G/DL (ref 32–36)
MCV RBC AUTO: 85 FL (ref 80–100)
NRBC BLD-RTO: 0 /100 WBCS (ref 0–0)
PLATELET # BLD AUTO: 288 X10*3/UL (ref 150–450)
POTASSIUM SERPL-SCNC: 3.3 MMOL/L (ref 3.5–5.3)
RBC # BLD AUTO: 4.54 X10*6/UL (ref 4–5.2)
S PNEUM AG UR QL: NEGATIVE
SODIUM SERPL-SCNC: 139 MMOL/L (ref 136–145)
WBC # BLD AUTO: 8.3 X10*3/UL (ref 4.4–11.3)

## 2024-06-16 PROCEDURE — 2500000001 HC RX 250 WO HCPCS SELF ADMINISTERED DRUGS (ALT 637 FOR MEDICARE OP): Performed by: STUDENT IN AN ORGANIZED HEALTH CARE EDUCATION/TRAINING PROGRAM

## 2024-06-16 PROCEDURE — 2500000005 HC RX 250 GENERAL PHARMACY W/O HCPCS: Performed by: STUDENT IN AN ORGANIZED HEALTH CARE EDUCATION/TRAINING PROGRAM

## 2024-06-16 PROCEDURE — 2500000004 HC RX 250 GENERAL PHARMACY W/ HCPCS (ALT 636 FOR OP/ED): Performed by: STUDENT IN AN ORGANIZED HEALTH CARE EDUCATION/TRAINING PROGRAM

## 2024-06-16 PROCEDURE — 80048 BASIC METABOLIC PNL TOTAL CA: CPT

## 2024-06-16 PROCEDURE — 36415 COLL VENOUS BLD VENIPUNCTURE: CPT

## 2024-06-16 PROCEDURE — 2500000004 HC RX 250 GENERAL PHARMACY W/ HCPCS (ALT 636 FOR OP/ED)

## 2024-06-16 PROCEDURE — 2500000004 HC RX 250 GENERAL PHARMACY W/ HCPCS (ALT 636 FOR OP/ED): Performed by: PHYSICIAN ASSISTANT

## 2024-06-16 PROCEDURE — 99232 SBSQ HOSP IP/OBS MODERATE 35: CPT | Performed by: STUDENT IN AN ORGANIZED HEALTH CARE EDUCATION/TRAINING PROGRAM

## 2024-06-16 PROCEDURE — 85027 COMPLETE CBC AUTOMATED: CPT

## 2024-06-16 PROCEDURE — 1100000001 HC PRIVATE ROOM DAILY

## 2024-06-16 RX ORDER — ONDANSETRON 4 MG/1
4 TABLET, ORALLY DISINTEGRATING ORAL EVERY 8 HOURS PRN
Status: DISCONTINUED | OUTPATIENT
Start: 2024-06-16 | End: 2024-06-20 | Stop reason: HOSPADM

## 2024-06-16 RX ORDER — ACETAMINOPHEN 325 MG/1
975 TABLET ORAL EVERY 6 HOURS
Status: DISCONTINUED | OUTPATIENT
Start: 2024-06-16 | End: 2024-06-20 | Stop reason: HOSPADM

## 2024-06-16 RX ORDER — AMOXICILLIN 250 MG
2 CAPSULE ORAL 2 TIMES DAILY
Status: DISCONTINUED | OUTPATIENT
Start: 2024-06-16 | End: 2024-06-18

## 2024-06-16 RX ORDER — ONDANSETRON HYDROCHLORIDE 2 MG/ML
4 INJECTION, SOLUTION INTRAVENOUS EVERY 8 HOURS PRN
Status: DISCONTINUED | OUTPATIENT
Start: 2024-06-16 | End: 2024-06-20 | Stop reason: HOSPADM

## 2024-06-16 RX ORDER — ONDANSETRON 4 MG/1
8 TABLET, ORALLY DISINTEGRATING ORAL EVERY 8 HOURS PRN
Qty: 20 TABLET | Refills: 0 | Status: SHIPPED | OUTPATIENT
Start: 2024-06-16 | End: 2024-06-19

## 2024-06-16 RX ORDER — LEVOFLOXACIN 750 MG/1
750 TABLET ORAL
Status: DISCONTINUED | OUTPATIENT
Start: 2024-06-16 | End: 2024-06-16

## 2024-06-16 RX ORDER — LEVOFLOXACIN 750 MG/1
750 TABLET ORAL
Qty: 2 TABLET | Refills: 0 | Status: SHIPPED | OUTPATIENT
Start: 2024-06-16 | End: 2024-06-19 | Stop reason: HOSPADM

## 2024-06-16 RX ORDER — AZITHROMYCIN 500 MG/1
500 TABLET, FILM COATED ORAL
Status: DISCONTINUED | OUTPATIENT
Start: 2024-06-16 | End: 2024-06-17

## 2024-06-16 RX ORDER — ALBUTEROL SULFATE 90 UG/1
2 AEROSOL, METERED RESPIRATORY (INHALATION) EVERY 4 HOURS PRN
Status: DISCONTINUED | OUTPATIENT
Start: 2024-06-16 | End: 2024-06-20 | Stop reason: HOSPADM

## 2024-06-16 RX ADMIN — LEVOFLOXACIN 750 MG: 750 TABLET, FILM COATED ORAL at 17:56

## 2024-06-16 RX ADMIN — ENOXAPARIN SODIUM 40 MG: 100 INJECTION SUBCUTANEOUS at 10:11

## 2024-06-16 RX ADMIN — AZITHROMYCIN 500 MG: 500 INJECTION, POWDER, LYOPHILIZED, FOR SOLUTION INTRAVENOUS at 00:54

## 2024-06-16 RX ADMIN — CEFTRIAXONE SODIUM 1000 MG: 1 INJECTION, SOLUTION INTRAVENOUS at 23:26

## 2024-06-16 RX ADMIN — ACETAMINOPHEN 975 MG: 325 TABLET ORAL at 10:11

## 2024-06-16 RX ADMIN — SENNOSIDES AND DOCUSATE SODIUM 2 TABLET: 50; 8.6 TABLET ORAL at 23:27

## 2024-06-16 RX ADMIN — ONDANSETRON 4 MG: 4 TABLET, ORALLY DISINTEGRATING ORAL at 12:58

## 2024-06-16 RX ADMIN — ENOXAPARIN SODIUM 40 MG: 100 INJECTION SUBCUTANEOUS at 20:27

## 2024-06-16 ASSESSMENT — COGNITIVE AND FUNCTIONAL STATUS - GENERAL
DAILY ACTIVITIY SCORE: 24
DAILY ACTIVITIY SCORE: 24
MOBILITY SCORE: 24
DAILY ACTIVITIY SCORE: 24

## 2024-06-16 ASSESSMENT — PAIN SCALES - GENERAL
PAINLEVEL_OUTOF10: 5 - MODERATE PAIN
PAINLEVEL_OUTOF10: 0 - NO PAIN

## 2024-06-16 ASSESSMENT — PAIN - FUNCTIONAL ASSESSMENT: PAIN_FUNCTIONAL_ASSESSMENT: 0-10

## 2024-06-16 ASSESSMENT — PAIN DESCRIPTION - LOCATION: LOCATION: HEAD

## 2024-06-16 NOTE — CARE PLAN
The patient's goals for the shift include  AB therapy    The clinical goals for the shift include decrease nausea    Problem: Skin  Goal: Participates in plan/prevention/treatment measures  Outcome: Progressing     Problem: Skin  Goal: Promote/optimize nutrition  Outcome: Progressing

## 2024-06-16 NOTE — PROGRESS NOTES
Ms. Shefali Arango is a 29 y.o. female who is on hospital day #1 for Vomiting.      Assessment/Plan     30 y/o F patient with PMHX of Asthma, DVT/PE (s/p eliquis at age of 26), IBS, HTN, who presents with nausea/vomiting of 1 month duration. Labs remarkable for WBC 17.9 K and neuts 87.5%  U/A WBC 11-20, , Urine cx obtained CT Abd/pelvis: showed Incompletely visualized consolidative opacity within the lingula concerning for pneumonia. The patient was admitted to CDU treated with IV Rocephin and azithromycin unable to pass p.o. challenge despite IV fluids/antiemetics.  Admitted for further management.     # Left lingula pneumonia  # Nausea/vomiting  Has had nausea, vomiting, and lightheadedness for about 1 month. Had a course of keflex and flagyl 5/14 for UTI and trichomonas treatment. Initially improved, but then symptoms recurred. On CT A/P patient found to have left lingula pneumonia and has been getting IV ceftriaxone and azithromycin since 6/13. Patient continues to be nauseous and unable to take significant food intake or pills PO.   - Strep and legionella urine antigens negative  - Urine cx negative  - Continue with ceftriaxone/azithro x5 days (EOT 6/18/24)  - Tylenol for pain control  - Zofran prn     # Asthma  - Continue home LABA/ICS    CORE MEASURES  F: no IVF  strict I&Os   E: K>4, Mg>2  N: regular diet  P: subcutaneous lovenox  C: Full code, confirmed    35 minutes were spent on patient care, chart review, and discussion with the care team and family.    Martine Lamar MD      Subjective    Patient states she has not had vomiting, but continues to be nauseous with food. States she vomits pills when she takes them, especially the antibiotics. States this has been going on for a month.    Objective    Vitals:    06/16/24 1259   BP: 134/87   Pulse: 89   Resp: 18   Temp: 36.2 °C (97.2 °F)   SpO2: 99%      No intake or output data in the 24 hours ending 06/16/24 1514     GEN: well-appearing, no  acute distress  HEENT: PERRLA, EOMI, moist mucous membranes, no scleral icterus  NECK: Supple  CV: RRR, no murmurs/rubs/gallops  PULM: Breathing comfortably, clear to auscultation bilaterally  AB: Soft, obese, non-tender, non-distended  : No in-dwelling dias  MSK: No lower extremity edema bilaterally  NEURO: A&Ox3, following commands, conversational    LABS AND STUDIES  Relevant labs and studies discussed in A&P below.    MEDICATIONS  acetaminophen, 975 mg, oral, q6h  azithromycin, 500 mg, oral, q24h DIONICIO  cefTRIAXone, 1,000 mg, intravenous, q24h  enoxaparin, 40 mg, subcutaneous, q12h DIONICIO  mometasone, 1 puff, inhalation, Daily

## 2024-06-17 ENCOUNTER — APPOINTMENT (OUTPATIENT)
Dept: CARDIOLOGY | Facility: HOSPITAL | Age: 29
End: 2024-06-17
Payer: COMMERCIAL

## 2024-06-17 ENCOUNTER — APPOINTMENT (OUTPATIENT)
Dept: RADIOLOGY | Facility: HOSPITAL | Age: 29
End: 2024-06-17
Payer: COMMERCIAL

## 2024-06-17 LAB
ALBUMIN SERPL BCP-MCNC: 3.6 G/DL (ref 3.4–5)
ANION GAP SERPL CALC-SCNC: 12 MMOL/L (ref 10–20)
ATRIAL RATE: 88 BPM
BUN SERPL-MCNC: 4 MG/DL (ref 6–23)
CALCIUM SERPL-MCNC: 9.2 MG/DL (ref 8.6–10.6)
CHLORIDE SERPL-SCNC: 100 MMOL/L (ref 98–107)
CO2 SERPL-SCNC: 27 MMOL/L (ref 21–32)
CREAT SERPL-MCNC: 0.65 MG/DL (ref 0.5–1.05)
EGFRCR SERPLBLD CKD-EPI 2021: >90 ML/MIN/1.73M*2
ERYTHROCYTE [DISTWIDTH] IN BLOOD BY AUTOMATED COUNT: 13.5 % (ref 11.5–14.5)
FOLATE SERPL-MCNC: 8.4 NG/ML
GLUCOSE SERPL-MCNC: 95 MG/DL (ref 74–99)
HCT VFR BLD AUTO: 39.7 % (ref 36–46)
HGB BLD-MCNC: 12.7 G/DL (ref 12–16)
MAGNESIUM SERPL-MCNC: 2.17 MG/DL (ref 1.6–2.4)
MCH RBC QN AUTO: 27.9 PG (ref 26–34)
MCHC RBC AUTO-ENTMCNC: 32 G/DL (ref 32–36)
MCV RBC AUTO: 87 FL (ref 80–100)
NRBC BLD-RTO: 0 /100 WBCS (ref 0–0)
P AXIS: 66 DEGREES
P OFFSET: 205 MS
P ONSET: 157 MS
PHOSPHATE SERPL-MCNC: 3.9 MG/DL (ref 2.5–4.9)
PLATELET # BLD AUTO: 312 X10*3/UL (ref 150–450)
POTASSIUM SERPL-SCNC: 3.6 MMOL/L (ref 3.5–5.3)
PR INTERVAL: 126 MS
Q ONSET: 220 MS
QRS COUNT: 14 BEATS
QRS DURATION: 74 MS
QT INTERVAL: 366 MS
QTC CALCULATION(BAZETT): 442 MS
QTC FREDERICIA: 416 MS
R AXIS: 60 DEGREES
RBC # BLD AUTO: 4.56 X10*6/UL (ref 4–5.2)
SODIUM SERPL-SCNC: 135 MMOL/L (ref 136–145)
T AXIS: 30 DEGREES
T OFFSET: 403 MS
TSH SERPL-ACNC: 2.35 MIU/L (ref 0.44–3.98)
VENTRICULAR RATE: 88 BPM
VIT B12 SERPL-MCNC: 407 PG/ML (ref 211–911)
WBC # BLD AUTO: 7.2 X10*3/UL (ref 4.4–11.3)

## 2024-06-17 PROCEDURE — 83735 ASSAY OF MAGNESIUM: CPT | Performed by: STUDENT IN AN ORGANIZED HEALTH CARE EDUCATION/TRAINING PROGRAM

## 2024-06-17 PROCEDURE — 74018 RADEX ABDOMEN 1 VIEW: CPT | Performed by: RADIOLOGY

## 2024-06-17 PROCEDURE — 2500000004 HC RX 250 GENERAL PHARMACY W/ HCPCS (ALT 636 FOR OP/ED): Performed by: STUDENT IN AN ORGANIZED HEALTH CARE EDUCATION/TRAINING PROGRAM

## 2024-06-17 PROCEDURE — 36415 COLL VENOUS BLD VENIPUNCTURE: CPT | Performed by: STUDENT IN AN ORGANIZED HEALTH CARE EDUCATION/TRAINING PROGRAM

## 2024-06-17 PROCEDURE — 84100 ASSAY OF PHOSPHORUS: CPT | Performed by: STUDENT IN AN ORGANIZED HEALTH CARE EDUCATION/TRAINING PROGRAM

## 2024-06-17 PROCEDURE — 99223 1ST HOSP IP/OBS HIGH 75: CPT

## 2024-06-17 PROCEDURE — 70553 MRI BRAIN STEM W/O & W/DYE: CPT

## 2024-06-17 PROCEDURE — 99233 SBSQ HOSP IP/OBS HIGH 50: CPT | Performed by: STUDENT IN AN ORGANIZED HEALTH CARE EDUCATION/TRAINING PROGRAM

## 2024-06-17 PROCEDURE — 1100000001 HC PRIVATE ROOM DAILY

## 2024-06-17 PROCEDURE — 2500000004 HC RX 250 GENERAL PHARMACY W/ HCPCS (ALT 636 FOR OP/ED)

## 2024-06-17 PROCEDURE — 80069 RENAL FUNCTION PANEL: CPT | Performed by: STUDENT IN AN ORGANIZED HEALTH CARE EDUCATION/TRAINING PROGRAM

## 2024-06-17 PROCEDURE — 82607 VITAMIN B-12: CPT | Performed by: STUDENT IN AN ORGANIZED HEALTH CARE EDUCATION/TRAINING PROGRAM

## 2024-06-17 PROCEDURE — 85027 COMPLETE CBC AUTOMATED: CPT | Performed by: STUDENT IN AN ORGANIZED HEALTH CARE EDUCATION/TRAINING PROGRAM

## 2024-06-17 PROCEDURE — 82746 ASSAY OF FOLIC ACID SERUM: CPT | Performed by: STUDENT IN AN ORGANIZED HEALTH CARE EDUCATION/TRAINING PROGRAM

## 2024-06-17 PROCEDURE — 70545 MR ANGIOGRAPHY HEAD W/DYE: CPT

## 2024-06-17 PROCEDURE — 84443 ASSAY THYROID STIM HORMONE: CPT | Performed by: STUDENT IN AN ORGANIZED HEALTH CARE EDUCATION/TRAINING PROGRAM

## 2024-06-17 PROCEDURE — 93005 ELECTROCARDIOGRAM TRACING: CPT

## 2024-06-17 PROCEDURE — 2500000001 HC RX 250 WO HCPCS SELF ADMINISTERED DRUGS (ALT 637 FOR MEDICARE OP): Performed by: STUDENT IN AN ORGANIZED HEALTH CARE EDUCATION/TRAINING PROGRAM

## 2024-06-17 PROCEDURE — 74018 RADEX ABDOMEN 1 VIEW: CPT

## 2024-06-17 PROCEDURE — 2500000005 HC RX 250 GENERAL PHARMACY W/O HCPCS: Performed by: STUDENT IN AN ORGANIZED HEALTH CARE EDUCATION/TRAINING PROGRAM

## 2024-06-17 RX ORDER — SODIUM CHLORIDE, SODIUM LACTATE, POTASSIUM CHLORIDE, CALCIUM CHLORIDE 600; 310; 30; 20 MG/100ML; MG/100ML; MG/100ML; MG/100ML
150 INJECTION, SOLUTION INTRAVENOUS CONTINUOUS
Status: DISCONTINUED | OUTPATIENT
Start: 2024-06-17 | End: 2024-06-20 | Stop reason: HOSPADM

## 2024-06-17 RX ORDER — POLYETHYLENE GLYCOL 3350 17 G/17G
17 POWDER, FOR SOLUTION ORAL DAILY
Status: DISCONTINUED | OUTPATIENT
Start: 2024-06-17 | End: 2024-06-18

## 2024-06-17 RX ORDER — METOCLOPRAMIDE 5 MG/1
5 TABLET ORAL
Status: DISCONTINUED | OUTPATIENT
Start: 2024-06-17 | End: 2024-06-18

## 2024-06-17 RX ADMIN — ACETAMINOPHEN 975 MG: 325 TABLET ORAL at 21:12

## 2024-06-17 RX ADMIN — ENOXAPARIN SODIUM 40 MG: 100 INJECTION SUBCUTANEOUS at 10:10

## 2024-06-17 RX ADMIN — POLYETHYLENE GLYCOL 3350 17 G: 17 POWDER, FOR SOLUTION ORAL at 14:50

## 2024-06-17 RX ADMIN — ONDANSETRON 4 MG: 4 TABLET, ORALLY DISINTEGRATING ORAL at 12:31

## 2024-06-17 RX ADMIN — METOCLOPRAMIDE 5 MG: 5 TABLET ORAL at 21:12

## 2024-06-17 RX ADMIN — SENNOSIDES AND DOCUSATE SODIUM 2 TABLET: 50; 8.6 TABLET ORAL at 21:22

## 2024-06-17 RX ADMIN — SODIUM CHLORIDE, POTASSIUM CHLORIDE, SODIUM LACTATE AND CALCIUM CHLORIDE 150 ML/HR: 600; 310; 30; 20 INJECTION, SOLUTION INTRAVENOUS at 14:50

## 2024-06-17 RX ADMIN — ACETAMINOPHEN 975 MG: 325 TABLET ORAL at 14:30

## 2024-06-17 RX ADMIN — AZITHROMYCIN DIHYDRATE 500 MG: 500 INJECTION, POWDER, LYOPHILIZED, FOR SOLUTION INTRAVENOUS at 16:00

## 2024-06-17 RX ADMIN — SENNOSIDES AND DOCUSATE SODIUM 2 TABLET: 50; 8.6 TABLET ORAL at 14:00

## 2024-06-17 RX ADMIN — CEFTRIAXONE SODIUM 1000 MG: 1 INJECTION, SOLUTION INTRAVENOUS at 21:13

## 2024-06-17 RX ADMIN — ENOXAPARIN SODIUM 40 MG: 100 INJECTION SUBCUTANEOUS at 21:12

## 2024-06-17 SDOH — ECONOMIC STABILITY: HOUSING INSECURITY: IN THE LAST 12 MONTHS, HOW MANY PLACES HAVE YOU LIVED?: 1

## 2024-06-17 SDOH — ECONOMIC STABILITY: INCOME INSECURITY: HOW HARD IS IT FOR YOU TO PAY FOR THE VERY BASICS LIKE FOOD, HOUSING, MEDICAL CARE, AND HEATING?: PATIENT DECLINED

## 2024-06-17 SDOH — ECONOMIC STABILITY: HOUSING INSECURITY
IN THE LAST 12 MONTHS, WAS THERE A TIME WHEN YOU DID NOT HAVE A STEADY PLACE TO SLEEP OR SLEPT IN A SHELTER (INCLUDING NOW)?: PATIENT DECLINED

## 2024-06-17 SDOH — ECONOMIC STABILITY: TRANSPORTATION INSECURITY
IN THE PAST 12 MONTHS, HAS LACK OF TRANSPORTATION KEPT YOU FROM MEDICAL APPOINTMENTS OR FROM GETTING MEDICATIONS?: PATIENT DECLINED

## 2024-06-17 SDOH — ECONOMIC STABILITY: TRANSPORTATION INSECURITY
IN THE PAST 12 MONTHS, HAS LACK OF TRANSPORTATION KEPT YOU FROM MEETINGS, WORK, OR FROM GETTING THINGS NEEDED FOR DAILY LIVING?: PATIENT DECLINED

## 2024-06-17 SDOH — ECONOMIC STABILITY: INCOME INSECURITY: IN THE LAST 12 MONTHS, WAS THERE A TIME WHEN YOU WERE NOT ABLE TO PAY THE MORTGAGE OR RENT ON TIME?: PATIENT DECLINED

## 2024-06-17 SDOH — ECONOMIC STABILITY: TRANSPORTATION INSECURITY
IN THE PAST 12 MONTHS, HAS THE LACK OF TRANSPORTATION KEPT YOU FROM MEDICAL APPOINTMENTS OR FROM GETTING MEDICATIONS?: PATIENT DECLINED

## 2024-06-17 SDOH — ECONOMIC STABILITY: FOOD INSECURITY: HOW HARD IS IT FOR YOU TO PAY FOR THE VERY BASICS LIKE FOOD, HOUSING, MEDICAL CARE, AND HEATING?: PATIENT DECLINED

## 2024-06-17 SDOH — ECONOMIC STABILITY: HOUSING INSECURITY: IN THE LAST 12 MONTHS, WAS THERE A TIME WHEN YOU WERE NOT ABLE TO PAY THE MORTGAGE OR RENT ON TIME?: PATIENT DECLINED

## 2024-06-17 ASSESSMENT — PAIN - FUNCTIONAL ASSESSMENT
PAIN_FUNCTIONAL_ASSESSMENT: 0-10
PAIN_FUNCTIONAL_ASSESSMENT: 0-10

## 2024-06-17 ASSESSMENT — COGNITIVE AND FUNCTIONAL STATUS - GENERAL
MOBILITY SCORE: 24
DAILY ACTIVITIY SCORE: 24

## 2024-06-17 ASSESSMENT — ACTIVITIES OF DAILY LIVING (ADL): LACK_OF_TRANSPORTATION: PATIENT DECLINED

## 2024-06-17 ASSESSMENT — PAIN SCALES - WONG BAKER: WONGBAKER_NUMERICALRESPONSE: NO HURT

## 2024-06-17 ASSESSMENT — PAIN SCALES - GENERAL
PAINLEVEL_OUTOF10: 6
PAINLEVEL_OUTOF10: 0 - NO PAIN

## 2024-06-17 NOTE — CONSULTS
"Inpatient consult to Neurology  Consult performed by: Denis Villela MD  Consult ordered by: Flip Funez MD          History Of Present Illness  Shefali Arango is a 29 y.o. female with history of asthma, Obesity, DVT/PE, HTN, and IBS who is currently admitted to medicine on 6/15 for nausea/vomiting, and multiple other complaints, found to have leukocytosis suspected 2/2 CAP vs. UTI. Neurology consulted for evaluation of headaches, n/v, dizziness.   Per chart reandriyw, pt has had multiple ED visits this year for N/V/D, lightheadedness.    On interview pt states she has been having frequent episodes of multiple symptoms for the past few months which have been getting worse. She describes frequent headaches that are usually present upon waking up, describes it as throbbing pain, involving the bilateral frontal areas or the whole head, usually is 6-7/10 but can be 10/10 in severity. Usually associated with blurred vision and \"dots in the vision\" that occur after the headache starts and resolve, it is also associated with nausea and vomiting, abdominal pain, and sensitivity to loud sounds, it usually occurs once she wakes up in the morning, usually lasts an hour but can last longer, it can sometimes recur throughout the day but usually resolves by night time. It does not get worse by standing or lying down. The headache usually responds well to tylenol. Did not notice any specific triggers. She has had 15 days of headaches for the last month.    She also described other symptoms including lightheadedness and feeling that she is about to faint, this is usually associated with generalized body weakness and heat and palpitations. She notices these symptoms more apparent when she sit or stands up and sometimes occurs when she strain to have a bowel movement. She also described her dizziness as sometimes representing a feeling of \"spinning sensation\" but this is infrequent and is not positional.     She feels " that her symptoms have been gradually getting worse over the past few months. She denies HX of migraine although she has chart diagnossi of migraine.    She denies being on regular home medications including OCPs(recently removed vaginal ring).    She has seen her PCP for these symptoms, TSH was wnl, and was referred for sleep study.       ROS negative for tinnitus, other visual disturbances, focal weakness, numbness, bulbar symptoms, gait imbalance, bowel/bladder deficits. Denies discrete episode of focal neurologic symptoms of visual loss that spontaneously resolved in the past.    On Admission:  Vitals /57, HR 77, RR 16, 98% on RA, afebrile  CBC: 17.9>13.7<306  RFP: 135/3.6/100/27/4/0.65, glucose 113  UA 11-20 WBC, +LE.  Pregnancy test negative   Being treated with CTX and Azithromycin     Denies having prior MRI brain.    Medical/Surgical Hx: as above  Family Hx: Denies family Hx of migraine, MS. Mom with Hx of blood clots  Social Hx: denies tobacco use, EtOH, illicit drugs    Allergies: Ketoralac, Prochlorperazine, Tramadol    Current Outpatient Medications   Medication Instructions    acetaminophen (TYLENOL) 650 mg, oral, Every 6 hours PRN    albuterol 180 mcg, inhalation, Every 6 hours PRN    budesonide (Pulmicort) 90 mcg/actuation inhaler 1 puff, inhalation, 2 times daily PRN, Rinse mouth with water after use to reduce aftertaste and incidence of candidiasis. Do not swallow.    levoFLOXacin (LEVAQUIN) 750 mg, oral, Every 24 hours scheduled    ondansetron ODT (ZOFRAN-ODT) 4 mg, oral, Every 8 hours PRN    ondansetron ODT (ZOFRAN-ODT) 8 mg, oral, Every 8 hours PRN    Slynd 4 mg (28) tablet 1 tablet, oral, Daily       Past Medical History  Past Medical History:   Diagnosis Date    Asthma ([facility])     Bronchitis     DVT (deep vein thrombosis) in pregnancy ([facility])     Essential (primary) hypertension 10/31/2014    Elevated BP    History of IBS     Hx of chlamydia infection     Hx of pulmonary embolus      Morbid (severe) obesity due to excess calories ([facility]) 12/16/2019    Morbid obesity with BMI of 50.0-59.9, adult     Surgical History  Past Surgical History:   Procedure Laterality Date    CHOLECYSTECTOMY      GALLBLADDER SURGERY  03/15/2018    Gallbladder Surgery     Social History  Social History     Tobacco Use    Smoking status: Never    Smokeless tobacco: Never   Substance Use Topics    Alcohol use: Not Currently     Alcohol/week: 1.0 - 2.0 standard drink of alcohol     Types: 1 - 2 Glasses of wine per week    Drug use: Yes     Types: Marijuana     Allergies  Ketorolac, Prochlorperazine, and Tramadol  Medications Prior to Admission   Medication Sig Dispense Refill Last Dose    acetaminophen (TylenoL) 325 mg tablet Take 2 tablets (650 mg) by mouth every 6 hours if needed for mild pain (1 - 3) or fever (temp greater than 38.0 C). 30 tablet 0 Past Week    albuterol 90 mcg/actuation aerosol powdr breath activated inhaler Inhale 2 puffs every 6 hours if needed for wheezing.   Past Week    budesonide (Pulmicort) 90 mcg/actuation inhaler Inhale 1 puff 2 times a day as needed (with albuterol for wheezing). Rinse mouth with water after use to reduce aftertaste and incidence of candidiasis. Do not swallow. 1 each 1 Past Week    ondansetron ODT (Zofran-ODT) 4 mg disintegrating tablet Take 1 tablet (4 mg) by mouth every 8 hours if needed for nausea or vomiting. 15 tablet 0 6/15/2024    Slynd 4 mg (28) tablet Take 1 tablet by mouth once daily. (Patient not taking: Reported on 6/5/2024) 30 tablet 3 Unknown       Review of Systems  Neurological Exam  Physical Exam  GENERAL APPEARANCE:  No distress, alert, interactive and cooperative.     CARDIOVASCULAR: Regular rate and rhythm. Radial pulses +2 and equal. No swelling, varicosities, edema, or tenderness to palpation.      MENTAL STATE:   Orientation was normal to time, place and person. Recent and remote memory was intact.  Attention span and concentration were normal.  Language testing was normal for comprehension, repetition, expression, and naming. The patient could correctly interpret a picture. General fund of knowledge was intact.     OPHTHALMOSCOPIC:   The ophthalmoscopic exam was grossly normal. The fundi were well visualized with normal disc margins. No disc edema. T    CRANIAL NERVES:   CN 2   Visual fields full to confrontation.   CN 3, 4, 6   Pupils round, 4 mm in diameter, equally reactive to light. Lids symmetric; no ptosis. EOMs normal alignment, full range with normal saccades, pursuit and convergence.   No nystagmus.   CN 5   Facial sensation intact bilaterally.   CN 7   Normal and symmetric facial strength. Nasolabial folds symmetric.   CN 8   Hearing intact to conversation.  CN 9/10  Palate elevates symmetrically.   CN 11   Normal strength of shoulder shrug and neck turning.   CN 12   Tongue midline, with normal bulk and strength; no fasciculations.     MOTOR:   Muscle bulk and tone were normal in both upper and lower extremities.   No fasciculations, tremor or other abnormal movements were present.                         R          L  Deltoids        5          5  Biceps          5          5  Triceps          5          5  Wrist Flex      5          5  Wrist Ext       5          5    Hip Flex         5          5  Knee Flex      5          5  Knee Ext        5          5  Dorsiflex         5          5  Plantarflex      5          5    REFLEXES:                       R          L  BR:               2         2  Biceps:         2          2  Triceps:        2          2  Knee:            2          2  Ankle:          2          2    Babinski: toes downgoing to plantar stimulation. No clonus or other pathologic reflexes present.     SENSORY:   In both upper and lower extremities, sensation was intact to light touch in all extremities.    COORDINATION:    In both upper extremities, finger-nose-finger was intact without dysmetria or overshoot.     GAIT: Deferred  "as pt was connected to pIV    Last Recorded Vitals  Blood pressure 138/88, pulse 100, temperature 36.8 °C (98.2 °F), resp. rate 18, height 1.575 m (5' 2\"), weight 118 kg (260 lb), SpO2 99%.         Assessment/Plan   Principal Problem:    Community acquired bacterial pneumonia  Active Problems:    Community acquired pneumonia, unspecified laterality    Community acquired pneumonia of left lung, unspecified part of lung  Shefali Arango is a 29 y.o. female with history of asthma, Obesity, DVT/PE(unclear documentation if provoked but occurred 1-2 weeks post pregnancy, unremarkable hypercoag work up), HTN, and IBS who is currently admitted to medicine on 6/15 for nausea/vomiting, and multiple other complaints, found to have leukocytosis suspected 2/2 CAP vs. UTI. Neurology consulted for evaluation of headaches, n/v, dizziness. She describes few months of early morning throbbing HA associated with N/V, phonophobia that is not positional. She also describes situational and positional lightheadedness for similar duration with palpitation. Her exam including funduscopic exam was unremarkable.     Ddx for her headache can include primary headache disorders including migraine, however given the recent worsening headache symptoms that are early morning with N/V, we would recommend MRI/MRV brain with contrast to rule out CVST especially with hx of clots in the past. Imaging can also help evaluate for possible IIH which is in the differential as well. Regarding her symptoms of lightheadedness, this seems like it is positional and situational with associated palpitation which could reflect orthostatic hypotension or POTS.      Recommendations:  - Orthostatic vitals, fluid repletion if positive  - MRI Brain, MRV with contrast.  - Agree with outpt sleep study.    Denis Villela  PGY2 Neurology    Gen Neuro Pager: [phone]    Will be formally staffed with the attending in the AM.    Denis Villela MD  "

## 2024-06-17 NOTE — PROGRESS NOTES
Met with pt and introduced myself as a TCC. Pt was independent prior to admission and denies any discharge needs except for a ride home. TCC made pt aware she can call her insurance for a ride home. Will continue to follow as needed.

## 2024-06-17 NOTE — PROGRESS NOTES
Assessment/Plan   30 y/o F patient with PMHX of Asthma, DVT/PE (s/p eliquis at age of 26), IBS, HTN, who presents with nausea/vomiting of 1 month duration. Labs remarkable for WBC 17.9 K and neuts 87.5%  U/A WBC 11-20, , Urine cx obtained CT Abd/pelvis: showed Incompletely visualized consolidative opacity within the lingula concerning for pneumonia. The patient was admitted to CDU treated with IV Rocephin and azithromycin unable to pass p.o. challenge despite IV fluids/antiemetics.  Admitted for further management. Pt noted to have ED visits/admissions for headaches, dizziness, nausea, abdominal pain since the beginning of April. Currently have daily intermittent headaches, with visual perception changes/body sensory changes, nausea, dizziness described as vertigo at times as wall as presyncope. She report frequent nausea associated with foods as well as her headahces/dizziness. She was treated empirically for pelvic inflammatory disease in April, 2023 has not followed with gyne, trich/gc/chlamydia were neg on testing. Pelvic ultrasound was normal. She was treated for trichomonas in may 2/2024. She appears to have had nausea chronically, but again starting since februrary and worsening since may.  Repeat Ct's of abdomen have been normal. She has been treated for recurrent UTI. She is currently having constipation with a reduction in bms from baseline. She is emotionally distraught by her sysmptoms and report normal health prior to April.      #headaches  #dizziness  #recurrent n/v with poor oral intake  - unclear at this time. Reports possible aura, headaches, dizziness with visual perception changes/body sensory changes, unclear if related or separate processess. Rule out migraines.   - neuro consulted to eval possible migraine and dizziness. No abnormal eye momements and sensory and motor exam is symetric.   - headaches primarily occur in the am  - order mri head  - check orthostatics  - continue tylenol  -  continue zofran prn, add reglan scheduled  - neuro consulted, appreciate recs    #constipation  -continue senna, add miralax  - check kub  - likely associated with her nausea as well.     # Left lingula pneumonia  - with cough, sputum, nasal congestion, improving.   - treating with ceftraixone/azithro x5 days. Change azitrho to iv do to po intol to abx per pt.     #Recurrent UTIs  - ua abnormal but ucx neg  - reports she could not complete last course of abx  - currently on ceftriaxone  - bladder scan for pvr    #PID dx 2023  - treated emperically in April, 2023  - recent treatment for trichomonas  - gc/chlamydia neg recent  - had not had follow up     #hypokalemia  - replaced  - fu rfp     # Asthma  - Continue home LABA/ICS  - not in exacerbation     C: Full code, confirmed    Scheduled outpatient appointments in system:   Future Appointments   Date Time Provider Department Center   6/25/2024  1:30 PM [id number] PFT RM 2 [id number] [facility]   6/26/2024  1:30 PM [facility] 1 [id number] [place]   7/18/2024  1:00 PM Austen Hdez MD [id number] Academic   7/31/2024  3:20 PM Katja Jack MD [id number] [facility]     ---------------------------------------------------------------------------------------------------  Subjective   Patient with multiple complaints including current headache, intermittent nausea, intermittent dizziness.  She reports spontaneous initiation of dizziness as well as with movement at times, both vertigo and presyncopal symptoms.  Reports nausea associated with food intake and dizziness.  Reports morning headaches, occurring pretty frequently at least twice a week, reports visual disturbance as well as body sensations prior, reports some association between her dizziness and her headaches as well as her nausea with her dizziness.  Reports prior to May all her symptoms were normal.  She was distraught and began crying over her symptoms during the encounter    "  ---------------------------------------------------------------------------------------------------  Objective   Last Recorded Vitals  Blood pressure 98/60, pulse 73, temperature 36.2 °C (97.2 °F), resp. rate 18, height 1.575 m (5' 2\"), weight 118 kg (260 lb), SpO2 98%.  Intake/Output last 3 Shifts:  I/O last 3 completed shifts:  In: 50 (0.4 mL/kg) [IV Piggyback:50]  Out: - (0 mL/kg)   Weight: 117.9 kg     Physical Exam  Vitals and nursing note reviewed.   Constitutional:       General: She is not in acute distress.     Appearance: Normal appearance. She is not ill-appearing or toxic-appearing.      Comments: Cooperative and appropriately interactive   HENT:      Head: Normocephalic and atraumatic.      Mouth/Throat:      Mouth: Mucous membranes are moist.   Eyes:      General: No scleral icterus.     Extraocular Movements: Extraocular movements intact.      Conjunctiva/sclera: Conjunctivae normal.   Cardiovascular:      Rate and Rhythm: Normal rate and regular rhythm.      Heart sounds: S1 normal and S2 normal. No murmur heard.  Pulmonary:      Effort: Pulmonary effort is normal. No respiratory distress.      Breath sounds: No wheezing, rhonchi or rales.   Abdominal:      General: Bowel sounds are normal. There is no distension.      Palpations: Abdomen is soft.      Tenderness: There is no abdominal tenderness. There is no guarding or rebound.   Musculoskeletal:         General: No swelling or deformity.      Cervical back: Neck supple.   Skin:     General: Skin is warm and dry.      Findings: No rash.   Neurological:      General: No focal deficit present.      Mental Status: She is alert and oriented to person, place, and time. Mental status is at baseline.      Sensory: No sensory deficit.      Motor: No weakness.      Coordination: Coordination normal.      Comments: EOMI   Psychiatric:         Mood and Affect: Mood normal.      Comments: Distraught, began crying discussing her medical issues "         Relevant Results  Lab Results   Component Value Date    WBC 8.3 06/16/2024    HGB 12.8 06/16/2024    HCT 38.6 06/16/2024    MCV 85 06/16/2024     06/16/2024      Lab Results   Component Value Date    GLUCOSE 126 (H) 06/16/2024    CALCIUM 9.1 06/16/2024     06/16/2024    K 3.3 (L) 06/16/2024    CO2 27 06/16/2024     06/16/2024    BUN 4 (L) 06/16/2024    CREATININE 0.73 06/16/2024     Scheduled medications  acetaminophen, 975 mg, oral, q6h  azithromycin, 500 mg, oral, q24h DIONICIO  cefTRIAXone, 1,000 mg, intravenous, q24h  enoxaparin, 40 mg, subcutaneous, q12h DIONICIO  mometasone, 1 puff, inhalation, Daily  sennosides-docusate sodium, 2 tablet, oral, BID      Continuous medications  sodium chloride 0.9%, 125 mL/hr, Last Rate: 125 mL/hr (06/15/24 1353)      PRN medications  PRN medications: albuterol, ondansetron ODT **OR** ondansetron    Flip Funez MD

## 2024-06-17 NOTE — CARE PLAN
The patient's goals for the shift include  having less nausea and vomiting.    The clinical goals for the shift include Pt will have no c/o N/V by the end of this shift.    Over the shift, the patient did not make progress toward the following goals. Barriers to progression include patient continuing to c/o nausea with some relief from PRN Zofran. Pt did not have an emesis today. Recommendations to address these barriers include patient being placed on IV LR for fluids.

## 2024-06-17 NOTE — CARE PLAN
The patient's goals for the shift include      The clinical goals for the shift include Patient will remain safe and free frpm falls/injuries overnight    No issues overnight patient started back on IV antibiotics and refused any pain or Tylenol, requested stool softener which was ordered and given

## 2024-06-18 LAB
ALBUMIN SERPL BCP-MCNC: 3.2 G/DL (ref 3.4–5)
ANION GAP SERPL CALC-SCNC: 12 MMOL/L (ref 10–20)
BUN SERPL-MCNC: 5 MG/DL (ref 6–23)
CALCIUM SERPL-MCNC: 9.1 MG/DL (ref 8.6–10.6)
CHLORIDE SERPL-SCNC: 103 MMOL/L (ref 98–107)
CO2 SERPL-SCNC: 28 MMOL/L (ref 21–32)
CREAT SERPL-MCNC: 0.62 MG/DL (ref 0.5–1.05)
EGFRCR SERPLBLD CKD-EPI 2021: >90 ML/MIN/1.73M*2
ERYTHROCYTE [DISTWIDTH] IN BLOOD BY AUTOMATED COUNT: 13.6 % (ref 11.5–14.5)
GLUCOSE SERPL-MCNC: 105 MG/DL (ref 74–99)
HCT VFR BLD AUTO: 39.3 % (ref 36–46)
HGB BLD-MCNC: 12.1 G/DL (ref 12–16)
MAGNESIUM SERPL-MCNC: 1.97 MG/DL (ref 1.6–2.4)
MCH RBC QN AUTO: 27.5 PG (ref 26–34)
MCHC RBC AUTO-ENTMCNC: 30.8 G/DL (ref 32–36)
MCV RBC AUTO: 89 FL (ref 80–100)
NRBC BLD-RTO: 0 /100 WBCS (ref 0–0)
PHOSPHATE SERPL-MCNC: 4.6 MG/DL (ref 2.5–4.9)
PLATELET # BLD AUTO: 312 X10*3/UL (ref 150–450)
POTASSIUM SERPL-SCNC: 3.7 MMOL/L (ref 3.5–5.3)
RBC # BLD AUTO: 4.4 X10*6/UL (ref 4–5.2)
SODIUM SERPL-SCNC: 139 MMOL/L (ref 136–145)
WBC # BLD AUTO: 6.6 X10*3/UL (ref 4.4–11.3)

## 2024-06-18 PROCEDURE — 83735 ASSAY OF MAGNESIUM: CPT | Performed by: STUDENT IN AN ORGANIZED HEALTH CARE EDUCATION/TRAINING PROGRAM

## 2024-06-18 PROCEDURE — 2500000004 HC RX 250 GENERAL PHARMACY W/ HCPCS (ALT 636 FOR OP/ED)

## 2024-06-18 PROCEDURE — 80069 RENAL FUNCTION PANEL: CPT | Performed by: STUDENT IN AN ORGANIZED HEALTH CARE EDUCATION/TRAINING PROGRAM

## 2024-06-18 PROCEDURE — 2500000004 HC RX 250 GENERAL PHARMACY W/ HCPCS (ALT 636 FOR OP/ED): Performed by: STUDENT IN AN ORGANIZED HEALTH CARE EDUCATION/TRAINING PROGRAM

## 2024-06-18 PROCEDURE — 1100000001 HC PRIVATE ROOM DAILY

## 2024-06-18 PROCEDURE — 99233 SBSQ HOSP IP/OBS HIGH 50: CPT | Performed by: STUDENT IN AN ORGANIZED HEALTH CARE EDUCATION/TRAINING PROGRAM

## 2024-06-18 PROCEDURE — 2500000001 HC RX 250 WO HCPCS SELF ADMINISTERED DRUGS (ALT 637 FOR MEDICARE OP): Performed by: STUDENT IN AN ORGANIZED HEALTH CARE EDUCATION/TRAINING PROGRAM

## 2024-06-18 PROCEDURE — 70553 MRI BRAIN STEM W/O & W/DYE: CPT | Performed by: RADIOLOGY

## 2024-06-18 PROCEDURE — 2550000001 HC RX 255 CONTRASTS: Performed by: STUDENT IN AN ORGANIZED HEALTH CARE EDUCATION/TRAINING PROGRAM

## 2024-06-18 PROCEDURE — 2500000005 HC RX 250 GENERAL PHARMACY W/O HCPCS: Performed by: STUDENT IN AN ORGANIZED HEALTH CARE EDUCATION/TRAINING PROGRAM

## 2024-06-18 PROCEDURE — A9575 INJ GADOTERATE MEGLUMI 0.1ML: HCPCS | Performed by: STUDENT IN AN ORGANIZED HEALTH CARE EDUCATION/TRAINING PROGRAM

## 2024-06-18 PROCEDURE — 36415 COLL VENOUS BLD VENIPUNCTURE: CPT | Performed by: STUDENT IN AN ORGANIZED HEALTH CARE EDUCATION/TRAINING PROGRAM

## 2024-06-18 PROCEDURE — 85027 COMPLETE CBC AUTOMATED: CPT | Performed by: STUDENT IN AN ORGANIZED HEALTH CARE EDUCATION/TRAINING PROGRAM

## 2024-06-18 RX ORDER — TOPIRAMATE 25 MG/1
25 TABLET ORAL NIGHTLY
Status: DISCONTINUED | OUTPATIENT
Start: 2024-06-18 | End: 2024-06-20 | Stop reason: HOSPADM

## 2024-06-18 RX ORDER — GADOTERATE MEGLUMINE 376.9 MG/ML
20 INJECTION INTRAVENOUS
Status: COMPLETED | OUTPATIENT
Start: 2024-06-18 | End: 2024-06-18

## 2024-06-18 RX ADMIN — SODIUM CHLORIDE, POTASSIUM CHLORIDE, SODIUM LACTATE AND CALCIUM CHLORIDE 150 ML/HR: 600; 310; 30; 20 INJECTION, SOLUTION INTRAVENOUS at 02:47

## 2024-06-18 RX ADMIN — AZITHROMYCIN DIHYDRATE 500 MG: 500 INJECTION, POWDER, LYOPHILIZED, FOR SOLUTION INTRAVENOUS at 14:16

## 2024-06-18 RX ADMIN — ENOXAPARIN SODIUM 40 MG: 100 INJECTION SUBCUTANEOUS at 09:18

## 2024-06-18 RX ADMIN — POLYETHYLENE GLYCOL 3350 17 G: 17 POWDER, FOR SOLUTION ORAL at 09:18

## 2024-06-18 RX ADMIN — ONDANSETRON 4 MG: 4 TABLET, ORALLY DISINTEGRATING ORAL at 13:14

## 2024-06-18 RX ADMIN — ENOXAPARIN SODIUM 40 MG: 100 INJECTION SUBCUTANEOUS at 20:24

## 2024-06-18 RX ADMIN — ACETAMINOPHEN 975 MG: 325 TABLET ORAL at 14:16

## 2024-06-18 RX ADMIN — TOPIRAMATE 25 MG: 25 TABLET, FILM COATED ORAL at 20:24

## 2024-06-18 RX ADMIN — SENNOSIDES AND DOCUSATE SODIUM 2 TABLET: 50; 8.6 TABLET ORAL at 09:18

## 2024-06-18 RX ADMIN — CEFTRIAXONE SODIUM 1000 MG: 1 INJECTION, SOLUTION INTRAVENOUS at 22:26

## 2024-06-18 RX ADMIN — SODIUM CHLORIDE, POTASSIUM CHLORIDE, SODIUM LACTATE AND CALCIUM CHLORIDE 150 ML/HR: 600; 310; 30; 20 INJECTION, SOLUTION INTRAVENOUS at 20:24

## 2024-06-18 RX ADMIN — GADOTERATE MEGLUMINE 20 ML: 376.9 INJECTION INTRAVENOUS at 00:42

## 2024-06-18 RX ADMIN — SODIUM CHLORIDE, POTASSIUM CHLORIDE, SODIUM LACTATE AND CALCIUM CHLORIDE 150 ML/HR: 600; 310; 30; 20 INJECTION, SOLUTION INTRAVENOUS at 09:19

## 2024-06-18 ASSESSMENT — PAIN SCALES - GENERAL
PAINLEVEL_OUTOF10: 0 - NO PAIN
PAINLEVEL_OUTOF10: 0 - NO PAIN

## 2024-06-18 ASSESSMENT — PAIN - FUNCTIONAL ASSESSMENT: PAIN_FUNCTIONAL_ASSESSMENT: 0-10

## 2024-06-18 NOTE — SIGNIFICANT EVENT
Updated Neurology Recommendations:  Shefali Arango is a 29 y.o. female with history of asthma, Obesity, DVT/PE(unclear documentation if provoked but occurred 1-2 weeks post pregnancy, unremarkable hypercoag work up), HTN, and IBS who is currently admitted to medicine on 6/15 for nausea/vomiting, and multiple other complaints, found to have leukocytosis suspected 2/2 CAP vs. UTI. Neurology consulted for evaluation of headaches, n/v, dizziness. She describes few months of early morning throbbing HA associated with N/V, phonophobia that is not positional. She also describes situational and positional lightheadedness for similar duration with palpitation. Her exam including funduscopic exam was unremarkable.      Ddx for her headache can include primary headache disorders including migraine, however given the recent worsening headache symptoms that are early morning with N/V, we would recommend MRI/MRV brain with contrast to rule out CVST especially with hx of clots in the past. Imaging can also help evaluate for possible IIH which is in the differential as well. Regarding her symptoms of lightheadedness, this seems like it is positional and situational with associated palpitation which could reflect orthostatic hypotension or POTS.    MRI brain, showed no diffusion restriction, no FLAIR hyperintensities. MRV reviewed, venous sinuses appear patent. History consistent with primary headache: chronic migraine >15 headache days per month. Will start preventative medication for migraine.     Recommendations:  - recommend starting:  Topiramate  For the first week, take 1 tablet (25mg) in the evening.   If no side effects and tolerating well, increase to 2 tablets (50mg) in the evening.  It could take up the 3 months to start seeing improvement in your headaches.   The most common side effects include numbness/tingling in your fingers/toes/around mouth, loss of appetite/weight loss, metallic taste to carbonated beverages,  and some cognitive slowing. More serious side effects include kidney stones and worsening depression. If any of these symptoms are experienced and not tolerated, please contact my office to discuss alternative options.  - continue Tylenol PRN for migraine abortive treatment (educated patient on medication overuse headache)  - educated on headache journal to monitor for frequency/triggers and staying hydrated  - neurology follow up in resident clinic in 2-3 months  - Consider OCT in Neuro clinic   - agree with outpatient sleep study for ISAIAS work up    Patient seen and examined with attending who agrees with above.   Neurology will sign off.    Pat Mejía MD  PGY-3 Neurology  Neurology [id number]

## 2024-06-18 NOTE — PROGRESS NOTES
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 Labs remarkable for WBC 17.9 K and neuts 87.5%  U/A WBC 11-20, , Urine cx obtained CT Abd/pelvis: showed Incompletely visualized consolidative opacity within the lingula concerning for pneumonia. The patient was admitted to CDU treated with IV Rocephin and azithromycin unable to pass p.o. challenge despite IV fluids/antiemetics.  Admitted for further management. Pt noted to have ED visits/admissions for headaches, dizziness, nausea, abdominal pain since the beginning of April. Currently have daily intermittent headaches, with visual perception changes/body sensory changes, nausea, dizziness described as vertigo at times as wall as presyncope. She report frequent nausea associated with foods as well as her headahces/dizziness. She was treated empirically for pelvic inflammatory disease in April, 2023 has not followed with gyne, trich/gc/chlamydia were neg on testing. Pelvic ultrasound was normal. She was treated for trichomonas in may 2/2024. She appears to have had nausea chronically, but again starting since februrary and worsening since may.  Repeat Ct's of abdomen have been normal. She has been treated for recurrent UTI. She is currently having constipation with a reduction in bms from baseline. She is emotionally distraught by her sysmptoms and report normal health prior to May.      #headaches  #dizziness  #recurrent n/v with poor oral intake  - unclear at this time. Reports possible aura, headaches, dizziness with visual perception changes/body sensory changes, unclear if related or separate processess. Rule out migraines.   - neuro consulted to eval possible migraine and dizziness. No abnormal eye momements and sensory and motor exam is symetric.   - headaches primarily occur in the am  - order mri head/mrv - negative for acute change or venous  thrombosis.   - check orthostatics - negative  - check echocardiogram given persistent dizziness.  - continue tylenol  - continue zofran prn, add reglan scheduled  - neuro consulted, appreciate recs. Suspect migraines. Discussed topiramate v. Amitriptyline, plan to titrate topiramate and follow up in [facility] in 2-3 mo.     #constipation -improved  -continue senna, add miralax  - check kub - pending read, personal read appears non-obstructive  - likely associated with her nausea as well.   - moving bowels, will hold until frequency improves.     # Left lingula pneumonia  - with cough, sputum, nasal congestion, improving.   - treating with ceftraixone/azithro x5 days. Change azitrho to iv do to po intol to abx per pt.     #Recurrent UTIs  - ua abnormal but ucx neg  - reports she could not complete last course of abx  - currently on ceftriaxone  - bladder scan for pvr    #PID dx 2023  - treated emperically in April, 2023  - recent treatment for trichomonas  - gc/chlamydia neg recent  - had not had follow up     #hypokalemia  - replaced  - fu rfp     # Asthma  - Continue home LABA/ICS  - not in exacerbation     C: Full code, confirmed    Scheduled outpatient appointments in system:   Future Appointments   Date Time Provider Department Center   6/25/2024  1:30 PM [facility] BOL6F PFT RM 2 [id number] [facility]   6/26/2024  1:30 PM [facility]   7/18/2024  1:00 PM Austen Hdez MD [id number] [facility]   7/31/2024  3:20 PM Katja Jack MD [id number] [facility]     ---------------------------------------------------------------------------------------------------  Subjective   Significant other at bedside.  Patient with multiple complaints including dizziness, weakness, nausea, inability to eat.  Stated she woke up and has a headache that only said about an hour this morning, she has had multiple bowel movements, feels weak when she is going to the bathroom and going to walk to the bathroom as well.  She  "feels at times like she is going to faint.  She has not been eating much.  Reports she does not like the way Regzuleyka made her feel in the past does not want to attempt.  Discussed options for prophylactic medications including risk benefits per neurology, to trial topiramate.    ---------------------------------------------------------------------------------------------------  Objective   Last Recorded Vitals  Blood pressure 120/61, pulse 80, temperature 36.2 °C (97.2 °F), resp. rate 17, height 1.575 m (5' 2\"), weight 118 kg (260 lb), SpO2 100%.  Intake/Output last 3 Shifts:  I/O last 3 completed shifts:  In: 2142.5 (18.2 mL/kg) [I.V.:1792.5 (15.2 mL/kg); IV Piggyback:350]  Out: - (0 mL/kg)   Weight: 117.9 kg     Physical Exam  Vitals and nursing note reviewed.   Constitutional:       General: She is not in acute distress.     Appearance: Normal appearance. She is not ill-appearing or toxic-appearing.      Comments: Cooperative and appropriately interactive   HENT:      Head: Normocephalic and atraumatic.      Mouth/Throat:      Mouth: Mucous membranes are moist.   Eyes:      General: No scleral icterus.     Extraocular Movements: Extraocular movements intact.      Conjunctiva/sclera: Conjunctivae normal.   Cardiovascular:      Rate and Rhythm: Normal rate and regular rhythm.      Heart sounds: S1 normal and S2 normal. No murmur heard.  Pulmonary:      Effort: Pulmonary effort is normal. No respiratory distress.      Breath sounds: No wheezing, rhonchi or rales.   Abdominal:      General: Bowel sounds are normal. There is no distension.      Palpations: Abdomen is soft.      Tenderness: There is no abdominal tenderness. There is no guarding or rebound.   Musculoskeletal:         General: No swelling or deformity.      Cervical back: Neck supple.   Skin:     General: Skin is warm and dry.      Findings: No rash.   Neurological:      General: No focal deficit present.      Mental Status: She is alert and oriented to " person, place, and time. Mental status is at baseline.      Sensory: No sensory deficit.      Motor: No weakness.      Coordination: Coordination normal.      Comments: EOMI   Psychiatric:         Mood and Affect: Mood normal.      Comments: Normal interactions         Relevant Results  Lab Results   Component Value Date    WBC 6.6 06/18/2024    HGB 12.1 06/18/2024    HCT 39.3 06/18/2024    MCV 89 06/18/2024     06/18/2024      Lab Results   Component Value Date    GLUCOSE 105 (H) 06/18/2024    CALCIUM 9.1 06/18/2024     06/18/2024    K 3.7 06/18/2024    CO2 28 06/18/2024     06/18/2024    BUN 5 (L) 06/18/2024    CREATININE 0.62 06/18/2024     Scheduled medications  acetaminophen, 975 mg, oral, q6h  azithromycin, 500 mg, intravenous, q24h  cefTRIAXone, 1,000 mg, intravenous, q24h  enoxaparin, 40 mg, subcutaneous, q12h DIONICIO  mometasone, 1 puff, inhalation, Daily  topiramate, 25 mg, oral, Nightly      Continuous medications  lactated Ringer's, 150 mL/hr, Last Rate: 150 mL/hr (06/18/24 0919)      PRN medications  PRN medications: albuterol, ondansetron ODT **OR** ondansetron, promethazine    Flip Funez MD

## 2024-06-18 NOTE — CARE PLAN
The patient's goals for the shift include  having less abdominal pain and N/V.    The clinical goals for the shift include Pt will have no c/o N/V by the end of this shift.    Over the shift, the patient did not make progress toward the following goals. Barriers to progression include patient having intermittent nausea and some abdominal pain toady. Recommendations to address these barriers include continuing patient's PRN nausea medication as ordered and scheduled Tylenol.

## 2024-06-18 NOTE — CARE PLAN
Problem: Skin  Goal: Participates in plan/prevention/treatment measures  Outcome: Progressing  Goal: Promote/optimize nutrition  Outcome: Progressing  Goal: Promote skin healing  Outcome: Progressing   The patient's goals for the shift include      The clinical goals for the shift include Progressing

## 2024-06-19 ENCOUNTER — PHARMACY VISIT (OUTPATIENT)
Dept: PHARMACY | Facility: CLINIC | Age: 29
End: 2024-06-19
Payer: MEDICAID

## 2024-06-19 ENCOUNTER — APPOINTMENT (OUTPATIENT)
Dept: CARDIOLOGY | Facility: HOSPITAL | Age: 29
End: 2024-06-19
Payer: COMMERCIAL

## 2024-06-19 VITALS
SYSTOLIC BLOOD PRESSURE: 109 MMHG | BODY MASS INDEX: 47.84 KG/M2 | WEIGHT: 260 LBS | TEMPERATURE: 97.7 F | OXYGEN SATURATION: 99 % | DIASTOLIC BLOOD PRESSURE: 62 MMHG | HEIGHT: 62 IN | HEART RATE: 76 BPM | RESPIRATION RATE: 17 BRPM

## 2024-06-19 PROBLEM — J15.9 COMMUNITY ACQUIRED BACTERIAL PNEUMONIA: Status: RESOLVED | Noted: 2024-06-14 | Resolved: 2024-06-19

## 2024-06-19 PROBLEM — J18.9 COMMUNITY ACQUIRED PNEUMONIA, UNSPECIFIED LATERALITY: Status: RESOLVED | Noted: 2024-06-14 | Resolved: 2024-06-19

## 2024-06-19 PROBLEM — J18.9 COMMUNITY ACQUIRED PNEUMONIA OF LEFT LUNG, UNSPECIFIED PART OF LUNG: Status: RESOLVED | Noted: 2024-06-15 | Resolved: 2024-06-19

## 2024-06-19 LAB
ALBUMIN SERPL BCP-MCNC: 3.3 G/DL (ref 3.4–5)
ANION GAP SERPL CALC-SCNC: 12 MMOL/L (ref 10–20)
AORTIC VALVE PEAK VELOCITY: 1.44 M/S
AV PEAK GRADIENT: 8.3 MMHG
AVA (PEAK VEL): 2.2 CM2
BUN SERPL-MCNC: 3 MG/DL (ref 6–23)
CALCIUM SERPL-MCNC: 9.3 MG/DL (ref 8.6–10.6)
CHLORIDE SERPL-SCNC: 103 MMOL/L (ref 98–107)
CO2 SERPL-SCNC: 29 MMOL/L (ref 21–32)
CREAT SERPL-MCNC: 0.59 MG/DL (ref 0.5–1.05)
EGFRCR SERPLBLD CKD-EPI 2021: >90 ML/MIN/1.73M*2
EJECTION FRACTION APICAL 4 CHAMBER: 52.4
ERYTHROCYTE [DISTWIDTH] IN BLOOD BY AUTOMATED COUNT: 13.6 % (ref 11.5–14.5)
GLUCOSE BLD MANUAL STRIP-MCNC: 116 MG/DL (ref 74–99)
GLUCOSE SERPL-MCNC: 94 MG/DL (ref 74–99)
HCT VFR BLD AUTO: 36.9 % (ref 36–46)
HGB BLD-MCNC: 12.1 G/DL (ref 12–16)
LEFT VENTRICULAR OUTFLOW TRACT DIAMETER: 1.96 CM
LV EJECTION FRACTION BIPLANE: 57 %
MAGNESIUM SERPL-MCNC: 1.94 MG/DL (ref 1.6–2.4)
MCH RBC QN AUTO: 27.8 PG (ref 26–34)
MCHC RBC AUTO-ENTMCNC: 32.8 G/DL (ref 32–36)
MCV RBC AUTO: 85 FL (ref 80–100)
MITRAL VALVE E/A RATIO: 1.51
NRBC BLD-RTO: 0 /100 WBCS (ref 0–0)
PHOSPHATE SERPL-MCNC: 4 MG/DL (ref 2.5–4.9)
PLATELET # BLD AUTO: 298 X10*3/UL (ref 150–450)
POTASSIUM SERPL-SCNC: 3.5 MMOL/L (ref 3.5–5.3)
RBC # BLD AUTO: 4.36 X10*6/UL (ref 4–5.2)
RIGHT VENTRICLE FREE WALL PEAK S': 11 CM/S
SODIUM SERPL-SCNC: 140 MMOL/L (ref 136–145)
WBC # BLD AUTO: 6.6 X10*3/UL (ref 4.4–11.3)

## 2024-06-19 PROCEDURE — 85027 COMPLETE CBC AUTOMATED: CPT | Performed by: STUDENT IN AN ORGANIZED HEALTH CARE EDUCATION/TRAINING PROGRAM

## 2024-06-19 PROCEDURE — 36415 COLL VENOUS BLD VENIPUNCTURE: CPT | Performed by: STUDENT IN AN ORGANIZED HEALTH CARE EDUCATION/TRAINING PROGRAM

## 2024-06-19 PROCEDURE — 83735 ASSAY OF MAGNESIUM: CPT | Performed by: STUDENT IN AN ORGANIZED HEALTH CARE EDUCATION/TRAINING PROGRAM

## 2024-06-19 PROCEDURE — 80069 RENAL FUNCTION PANEL: CPT | Performed by: STUDENT IN AN ORGANIZED HEALTH CARE EDUCATION/TRAINING PROGRAM

## 2024-06-19 PROCEDURE — 82947 ASSAY GLUCOSE BLOOD QUANT: CPT

## 2024-06-19 PROCEDURE — 93306 TTE W/DOPPLER COMPLETE: CPT | Performed by: INTERNAL MEDICINE

## 2024-06-19 PROCEDURE — 1100000001 HC PRIVATE ROOM DAILY

## 2024-06-19 PROCEDURE — 99239 HOSP IP/OBS DSCHRG MGMT >30: CPT | Performed by: STUDENT IN AN ORGANIZED HEALTH CARE EDUCATION/TRAINING PROGRAM

## 2024-06-19 PROCEDURE — 93306 TTE W/DOPPLER COMPLETE: CPT

## 2024-06-19 PROCEDURE — 2500000004 HC RX 250 GENERAL PHARMACY W/ HCPCS (ALT 636 FOR OP/ED): Performed by: STUDENT IN AN ORGANIZED HEALTH CARE EDUCATION/TRAINING PROGRAM

## 2024-06-19 PROCEDURE — RXMED WILLOW AMBULATORY MEDICATION CHARGE

## 2024-06-19 PROCEDURE — 2500000004 HC RX 250 GENERAL PHARMACY W/ HCPCS (ALT 636 FOR OP/ED)

## 2024-06-19 RX ORDER — ONDANSETRON 4 MG/1
8 TABLET, ORALLY DISINTEGRATING ORAL EVERY 6 HOURS PRN
Qty: 20 TABLET | Refills: 0 | Status: SHIPPED | OUTPATIENT
Start: 2024-06-19

## 2024-06-19 RX ORDER — CEFTRIAXONE 1 G/50ML
1000 INJECTION, SOLUTION INTRAVENOUS ONCE
Status: COMPLETED | OUTPATIENT
Start: 2024-06-19 | End: 2024-06-19

## 2024-06-19 RX ORDER — ONDANSETRON 4 MG/1
8 TABLET, ORALLY DISINTEGRATING ORAL EVERY 6 HOURS PRN
Qty: 20 TABLET | Refills: 0 | Status: SHIPPED | OUTPATIENT
Start: 2024-06-19 | End: 2024-06-19

## 2024-06-19 RX ORDER — TOPIRAMATE 25 MG/1
TABLET ORAL NIGHTLY
Qty: 54 TABLET | Refills: 0 | Status: SHIPPED | OUTPATIENT
Start: 2024-06-19 | End: 2024-06-19

## 2024-06-19 RX ORDER — TOPIRAMATE 25 MG/1
TABLET ORAL
Qty: 54 TABLET | Refills: 0 | Status: SHIPPED | OUTPATIENT
Start: 2024-06-19 | End: 2024-07-19

## 2024-06-19 RX ADMIN — AZITHROMYCIN DIHYDRATE 500 MG: 500 INJECTION, POWDER, LYOPHILIZED, FOR SOLUTION INTRAVENOUS at 14:54

## 2024-06-19 RX ADMIN — CEFTRIAXONE SODIUM 1000 MG: 1 INJECTION, SOLUTION INTRAVENOUS at 23:25

## 2024-06-19 RX ADMIN — PERFLUTREN 2 ML OF DILUTION: 6.52 INJECTION, SUSPENSION INTRAVENOUS at 11:15

## 2024-06-19 RX ADMIN — ENOXAPARIN SODIUM 40 MG: 100 INJECTION SUBCUTANEOUS at 14:54

## 2024-06-19 ASSESSMENT — COGNITIVE AND FUNCTIONAL STATUS - GENERAL
MOBILITY SCORE: 24
DAILY ACTIVITIY SCORE: 24

## 2024-06-19 ASSESSMENT — PAIN - FUNCTIONAL ASSESSMENT: PAIN_FUNCTIONAL_ASSESSMENT: 0-10

## 2024-06-19 ASSESSMENT — PAIN SCALES - GENERAL
PAINLEVEL_OUTOF10: 0 - NO PAIN

## 2024-06-19 NOTE — DISCHARGE SUMMARY
Date of Admission: 6/14/2024    Date of Discharge: 6/19/2024    Discharge Diagnosis  Community acquired bacterial pneumonia  UTI  Intractable migraine  Nausea and vomiting  Positional and situational dizziness    Issues Requiring Follow-Up  Patient with multiple symptoms impacting her quality of life, suspect migraines contributing, will still need to rule out other contributing etiologies.  Neurology feels Varimate is medication of choice, patient was counseled and plan to titrate further clinic with possible  Further testing after neurology follow-up.  Referred to GI for further evaluation of ongoing GI symptoms, given supportive management with Zofran for nausea  Refer to audiology and physical therapy vestibular clinic for her dizziness  All symptoms appear to be related to her headaches  Respiratory status improved  She was treated empirically previously for PID, given referral for gynecology follow-up    Discharge Meds     Your medication list        START taking these medications        Instructions Last Dose Given Next Dose Due   topiramate 25 mg tablet  Commonly known as: Topamax  Start taking on: June 19, 2024      Take 1 tablet (25 mg) by mouth once daily at bedtime for 6 days, THEN 2 tablets (50 mg) once daily at bedtime for 24 days. Please obtain refill from your primary care.              CHANGE how you take these medications        Instructions Last Dose Given Next Dose Due   ondansetron ODT 4 mg disintegrating tablet  Commonly known as: Zofran-ODT  What changed: Another medication with the same name was added. Make sure you understand how and when to take each.      Take 1 tablet (4 mg) by mouth every 8 hours if needed for nausea or vomiting.       ondansetron ODT 4 mg disintegrating tablet  Commonly known as: Zofran-ODT  What changed: You were already taking a medication with the same name, and this prescription was added. Make sure you understand how and when to take each.      Take 2 tablets (8  mg) by mouth every 6 hours if needed for nausea or vomiting.              CONTINUE taking these medications        Instructions Last Dose Given Next Dose Due   acetaminophen 325 mg tablet  Commonly known as: TylenoL      Take 2 tablets (650 mg) by mouth every 6 hours if needed for mild pain (1 - 3) or fever (temp greater than 38.0 C).       albuterol 90 mcg/actuation aerosol powdr breath activated inhaler           budesonide 90 mcg/actuation inhaler  Commonly known as: Pulmicort      Inhale 1 puff 2 times a day as needed (with albuterol for wheezing). Rinse mouth with water after use to reduce aftertaste and incidence of candidiasis. Do not swallow.              STOP taking these medications      Slynd 4 mg (28) tablet  Generic drug: drospirenone (contraceptive)                  Where to Get Your Medications        These medications were sent to [facility]  [address], [address], [place] [address]      Hours: 8AM to 6PM Mon-Fri, 8AM to 4PM Sat, 9AM to 1PM Sun Phone: [phone]   ondansetron ODT 4 mg disintegrating tablet  topiramate 25 mg tablet         Test Results Pending At Discharge  Pending Labs       No current pending labs.            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 Labs remarkable for WBC 17.9 K and neuts 87.5%  U/A WBC 11-20, , Urine cx obtained CT Abd/pelvis: showed Incompletely visualized consolidative opacity within the lingula concerning for pneumonia. The patient was admitted to CDU treated with IV Rocephin and azithromycin unable to pass p.o. challenge despite IV fluids/antiemetics.  Admitted for further management. Pt noted to have ED visits/admissions for headaches, dizziness, nausea, abdominal pain since the beginning of April. Currently have daily intermittent headaches, with visual perception changes/body sensory changes, nausea, dizziness  described as vertigo at times as wall as presyncope. She report frequent nausea associated with foods as well as her headahces/dizziness. She was treated empirically for pelvic inflammatory disease in April, 2023 has not followed with gyne, trich/gc/chlamydia were neg on testing. Pelvic ultrasound was normal. She was treated for trichomonas in may 2/2024. She appears to have had nausea chronically, but again starting since februrary and worsening since may.  Repeat Ct's of abdomen have been normal. She has been treated for recurrent UTI. She is currently having constipation with a reduction in bms from baseline. She is emotionally distraught by her sysmptoms and report normal health prior to May.  She completed antibiotics for her UTI and pneumonia while inpatient.  She was evaluated by neurology, suspected to have migraines contributing to the constellation of her symptoms.  Overall chemistries show stable electrolytes, creatinine.  Nausea appears controlled, she began moving her bowels.  Discussed that symptoms may not resolve right away and may take time to get better control and even with control she may still have intermittent breakthrough headaches.  Discussed with neurology, patient will need to trial medication and follow-up in clinic for further titration as well as possible further testing.  Discussed keeping a symptom log and bringing it with her to appointments.  Referred to GI for further evaluation of her ongoing GI symptoms, audiology and physical therapy vestibular clinic for further evaluation of her dizziness although likely related to her headaches.  Brain MRI showed no concerning pathology MRV showed no evidence of sinus thrombosis.  Recommendations discussed with patient and significant other.  [facility] consulted to help support barriers to her care and symptomatic management upon discharge.  Advised close follow-up and scheduling her referrals.  Discharge plan of care discussed,  education and counseling provided involving active problem care plan, warning signs,  risks/benefits of new medications or medication changes, follow-up care and testing.  Patient advised to follow-up with her primary care within 1 week of discharge or the next available for posthospitalization transition of care.  There was verbalized understanding and agreement with discharge care plan.    Pertinent Physical Exam At Time of Discharge  On the day of discharge,Vitals and labs were stable. On exam: No acute distress, morbidly obese, slightly withdrawn but became more interactive, no increased work of breathing, regular rate and rhythm, abdomen soft/nontender, no edema.    Outpatient Follow-Up  Future Appointments   Date Time Provider Department Center   6/25/2024  1:30 PM [facility] BOL6F PFT RM 2 [id number] [facility]   6/26/2024  1:30 PM [facility] SCC CT 1 [facility] [place]   7/18/2024  1:00 PM Austen Hdez MD [id number] [facility]   7/31/2024  3:20 PM Katja Jack MD [id number] [facility]       Pt instructed to take all medications as prescribed.  Keep all follow-up appointments.  Contact their primary care physician with any questions or concerns that arise.  Come to the emergency department with worsening of your symptoms or any other medical emergency.     Time spent >30 minutes on discharge management.    Flip Funez MD

## 2024-06-19 NOTE — CARE PLAN
The patient's goals for the shift include having no N/V.      The clinical goals for the shift include Pt will have no c/o N/V by the end of this shift.    Over the shift, the patient did make progress toward the following goals. Pt denied any pain or nausea today. Plan for discharge home this evening after receiving her IV antibiotic.

## 2024-06-19 NOTE — CARE PLAN
The patient's goals for the shift include      The clinical goals for the shift include Patient will remain safe and free from any falls/injuries overnight    No issues overnight

## 2024-06-19 NOTE — DISCHARGE INSTRUCTIONS
You presented with multiple complaints including headaches, dizziness, nausea vomiting and poor oral intake.  You have been experiencing the symptoms for over a month.  On clinical evaluation and discussion with neurology it appears that her symptoms are likely related to migraines, however it is recommended that you follow-up outpatient for continued evaluation of other causes while starting treatment.  For the migraines, you will start topiramate at 25 mg nightly for 6 more days, then increase the dose to 50 mg nightly if you are not experiencing side effects.  You will follow-up with neurology for further evaluation and medication adjustments.  A neurology referral was submitted.  We suspect your other symptoms may be related to your recurrent migraine disorder however you will be referred to gastroenterology, audiology, and physical therapy vestibular clinic for further evaluation of your symptoms.  We will prescribe supportive medications for your nausea.  Please keep a symptom log daily with dates and times and bring them to your outpatient appointments.  Please follow-up with your primary care doctor within 7 to 10 days or the next available, a referral was submitted for scheduling, however please call to schedule or confirm your appointment.     Per review of your records, you have a history of IUD insertion and were treated based on suspicion previously for pelvic inflammatory disease, it is recommended that you follow-up with a gynecologist outpatient and a referral was submitted.    To further support your symptomatic management, you were referred to [facility] at home .  They are a telehealth program that is multidisciplinary and can follow you up to about a month after discharge to address any barriers to your care that you are having.  This will not replace her outpatient visits.  Please look out for a phone call from them after discharge to schedule your appointments.    Please schedule your follow-up  appointments with: Primary care, neurology, audiology, physical therapy vestibular clinic, gynecology.  You will be contacted by [facility].

## 2024-06-21 LAB
ATRIAL RATE: 82 BPM
P AXIS: 68 DEGREES
P OFFSET: 208 MS
P ONSET: 157 MS
PR INTERVAL: 126 MS
Q ONSET: 220 MS
QRS COUNT: 13 BEATS
QRS DURATION: 74 MS
QT INTERVAL: 380 MS
QTC CALCULATION(BAZETT): 443 MS
QTC FREDERICIA: 421 MS
R AXIS: 60 DEGREES
T AXIS: 33 DEGREES
T OFFSET: 410 MS
VENTRICULAR RATE: 82 BPM

## 2024-06-26 ENCOUNTER — APPOINTMENT (OUTPATIENT)
Dept: RADIOLOGY | Facility: HOSPITAL | Age: 29
End: 2024-06-26
Payer: COMMERCIAL

## 2024-07-14 ENCOUNTER — HOSPITAL ENCOUNTER (EMERGENCY)
Facility: HOSPITAL | Age: 29
Discharge: HOME | End: 2024-07-14
Payer: COMMERCIAL

## 2024-07-14 VITALS
BODY MASS INDEX: 46.07 KG/M2 | WEIGHT: 260 LBS | OXYGEN SATURATION: 99 % | HEIGHT: 63 IN | DIASTOLIC BLOOD PRESSURE: 78 MMHG | HEART RATE: 72 BPM | SYSTOLIC BLOOD PRESSURE: 131 MMHG | TEMPERATURE: 97.2 F | RESPIRATION RATE: 17 BRPM

## 2024-07-14 PROCEDURE — 4500999001 HC ED NO CHARGE

## 2024-07-14 ASSESSMENT — PAIN SCALES - GENERAL: PAINLEVEL_OUTOF10: 0 - NO PAIN

## 2024-07-14 ASSESSMENT — PAIN - FUNCTIONAL ASSESSMENT: PAIN_FUNCTIONAL_ASSESSMENT: 0-10

## 2024-07-14 NOTE — ED TRIAGE NOTES
"Light head, issue with bowel movement, states it feels like her body is \"heating up\" some days she can go and other days she can't. Pt states she n/v last night around 1900. She's unsure if she has a stomach bug but her daughter does.   "

## 2024-07-16 ENCOUNTER — APPOINTMENT (OUTPATIENT)
Dept: RADIOLOGY | Facility: HOSPITAL | Age: 29
End: 2024-07-16
Payer: COMMERCIAL

## 2024-07-16 ENCOUNTER — CLINICAL SUPPORT (OUTPATIENT)
Dept: EMERGENCY MEDICINE | Facility: HOSPITAL | Age: 29
End: 2024-07-16
Payer: COMMERCIAL

## 2024-07-16 ENCOUNTER — HOSPITAL ENCOUNTER (EMERGENCY)
Facility: HOSPITAL | Age: 29
Discharge: HOME | End: 2024-07-17
Payer: COMMERCIAL

## 2024-07-16 VITALS
TEMPERATURE: 97 F | DIASTOLIC BLOOD PRESSURE: 85 MMHG | HEART RATE: 83 BPM | OXYGEN SATURATION: 96 % | HEIGHT: 63 IN | RESPIRATION RATE: 22 BRPM | WEIGHT: 260 LBS | BODY MASS INDEX: 46.07 KG/M2 | SYSTOLIC BLOOD PRESSURE: 117 MMHG

## 2024-07-16 DIAGNOSIS — K59.00 CONSTIPATION, UNSPECIFIED CONSTIPATION TYPE: ICD-10-CM

## 2024-07-16 DIAGNOSIS — R53.83 OTHER FATIGUE: ICD-10-CM

## 2024-07-16 DIAGNOSIS — R42 LIGHT HEADED: Primary | ICD-10-CM

## 2024-07-16 LAB
ALBUMIN SERPL BCP-MCNC: 3.9 G/DL (ref 3.4–5)
ALP SERPL-CCNC: 65 U/L (ref 33–110)
ALT SERPL W P-5'-P-CCNC: 10 U/L (ref 7–45)
ANION GAP SERPL CALC-SCNC: 13 MMOL/L (ref 10–20)
APPEARANCE UR: CLEAR
AST SERPL W P-5'-P-CCNC: 10 U/L (ref 9–39)
BASOPHILS # BLD AUTO: 0.03 X10*3/UL (ref 0–0.1)
BASOPHILS NFR BLD AUTO: 0.3 %
BILIRUB SERPL-MCNC: 0.4 MG/DL (ref 0–1.2)
BILIRUB UR STRIP.AUTO-MCNC: NEGATIVE MG/DL
BUN SERPL-MCNC: 5 MG/DL (ref 6–23)
CALCIUM SERPL-MCNC: 9.6 MG/DL (ref 8.6–10.6)
CARDIAC TROPONIN I PNL SERPL HS: <3 NG/L (ref 0–34)
CHLORIDE SERPL-SCNC: 103 MMOL/L (ref 98–107)
CO2 SERPL-SCNC: 26 MMOL/L (ref 21–32)
COLOR UR: YELLOW
CREAT SERPL-MCNC: 0.74 MG/DL (ref 0.5–1.05)
EGFRCR SERPLBLD CKD-EPI 2021: >90 ML/MIN/1.73M*2
EOSINOPHIL # BLD AUTO: 0.15 X10*3/UL (ref 0–0.7)
EOSINOPHIL NFR BLD AUTO: 1.3 %
ERYTHROCYTE [DISTWIDTH] IN BLOOD BY AUTOMATED COUNT: 14.1 % (ref 11.5–14.5)
GLUCOSE SERPL-MCNC: 108 MG/DL (ref 74–99)
GLUCOSE UR STRIP.AUTO-MCNC: NORMAL MG/DL
HCT VFR BLD AUTO: 37.9 % (ref 36–46)
HGB BLD-MCNC: 12.6 G/DL (ref 12–16)
IMM GRANULOCYTES # BLD AUTO: 0.03 X10*3/UL (ref 0–0.7)
IMM GRANULOCYTES NFR BLD AUTO: 0.3 % (ref 0–0.9)
KETONES UR STRIP.AUTO-MCNC: NEGATIVE MG/DL
LEUKOCYTE ESTERASE UR QL STRIP.AUTO: ABNORMAL
LYMPHOCYTES # BLD AUTO: 1.65 X10*3/UL (ref 1.2–4.8)
LYMPHOCYTES NFR BLD AUTO: 14.4 %
MAGNESIUM SERPL-MCNC: 1.84 MG/DL (ref 1.6–2.4)
MCH RBC QN AUTO: 27.6 PG (ref 26–34)
MCHC RBC AUTO-ENTMCNC: 33.2 G/DL (ref 32–36)
MCV RBC AUTO: 83 FL (ref 80–100)
MONOCYTES # BLD AUTO: 0.56 X10*3/UL (ref 0.1–1)
MONOCYTES NFR BLD AUTO: 4.9 %
MUCOUS THREADS #/AREA URNS AUTO: NORMAL /LPF
NEUTROPHILS # BLD AUTO: 9.05 X10*3/UL (ref 1.2–7.7)
NEUTROPHILS NFR BLD AUTO: 78.8 %
NITRITE UR QL STRIP.AUTO: NEGATIVE
NRBC BLD-RTO: 0 /100 WBCS (ref 0–0)
PH UR STRIP.AUTO: 6.5 [PH]
PLATELET # BLD AUTO: 321 X10*3/UL (ref 150–450)
POTASSIUM SERPL-SCNC: 3.5 MMOL/L (ref 3.5–5.3)
PREGNANCY TEST URINE, POC: NEGATIVE
PROT SERPL-MCNC: 7.9 G/DL (ref 6.4–8.2)
PROT UR STRIP.AUTO-MCNC: ABNORMAL MG/DL
RBC # BLD AUTO: 4.57 X10*6/UL (ref 4–5.2)
RBC # UR STRIP.AUTO: NEGATIVE /UL
RBC #/AREA URNS AUTO: NORMAL /HPF
SODIUM SERPL-SCNC: 138 MMOL/L (ref 136–145)
SP GR UR STRIP.AUTO: 1.02
SQUAMOUS #/AREA URNS AUTO: NORMAL /HPF
UROBILINOGEN UR STRIP.AUTO-MCNC: NORMAL MG/DL
WBC # BLD AUTO: 11.5 X10*3/UL (ref 4.4–11.3)
WBC #/AREA URNS AUTO: NORMAL /HPF

## 2024-07-16 PROCEDURE — 71046 X-RAY EXAM CHEST 2 VIEWS: CPT

## 2024-07-16 PROCEDURE — 87491 CHLMYD TRACH DNA AMP PROBE: CPT

## 2024-07-16 PROCEDURE — 2500000004 HC RX 250 GENERAL PHARMACY W/ HCPCS (ALT 636 FOR OP/ED): Mod: SE

## 2024-07-16 PROCEDURE — 87086 URINE CULTURE/COLONY COUNT: CPT

## 2024-07-16 PROCEDURE — 96374 THER/PROPH/DIAG INJ IV PUSH: CPT

## 2024-07-16 PROCEDURE — 85025 COMPLETE CBC W/AUTO DIFF WBC: CPT

## 2024-07-16 PROCEDURE — 81001 URINALYSIS AUTO W/SCOPE: CPT

## 2024-07-16 PROCEDURE — 96361 HYDRATE IV INFUSION ADD-ON: CPT

## 2024-07-16 PROCEDURE — 99285 EMERGENCY DEPT VISIT HI MDM: CPT

## 2024-07-16 PROCEDURE — 83735 ASSAY OF MAGNESIUM: CPT

## 2024-07-16 PROCEDURE — 99284 EMERGENCY DEPT VISIT MOD MDM: CPT | Mod: 25

## 2024-07-16 PROCEDURE — 84484 ASSAY OF TROPONIN QUANT: CPT

## 2024-07-16 PROCEDURE — 71046 X-RAY EXAM CHEST 2 VIEWS: CPT | Performed by: STUDENT IN AN ORGANIZED HEALTH CARE EDUCATION/TRAINING PROGRAM

## 2024-07-16 PROCEDURE — 36415 COLL VENOUS BLD VENIPUNCTURE: CPT

## 2024-07-16 PROCEDURE — 84075 ASSAY ALKALINE PHOSPHATASE: CPT

## 2024-07-16 PROCEDURE — 81025 URINE PREGNANCY TEST: CPT

## 2024-07-16 PROCEDURE — 87210 SMEAR WET MOUNT SALINE/INK: CPT

## 2024-07-16 PROCEDURE — 93005 ELECTROCARDIOGRAM TRACING: CPT

## 2024-07-16 RX ORDER — ONDANSETRON HYDROCHLORIDE 2 MG/ML
4 INJECTION, SOLUTION INTRAVENOUS ONCE
Status: COMPLETED | OUTPATIENT
Start: 2024-07-16 | End: 2024-07-16

## 2024-07-16 RX ORDER — METOCLOPRAMIDE HYDROCHLORIDE 5 MG/ML
10 INJECTION INTRAMUSCULAR; INTRAVENOUS ONCE
Status: DISCONTINUED | OUTPATIENT
Start: 2024-07-16 | End: 2024-07-16

## 2024-07-16 RX ORDER — DIPHENHYDRAMINE HYDROCHLORIDE 50 MG/ML
25 INJECTION INTRAMUSCULAR; INTRAVENOUS ONCE
Status: DISCONTINUED | OUTPATIENT
Start: 2024-07-16 | End: 2024-07-16

## 2024-07-16 RX ORDER — ACETAMINOPHEN 325 MG/1
975 TABLET ORAL ONCE
Status: COMPLETED | OUTPATIENT
Start: 2024-07-16 | End: 2024-07-16

## 2024-07-16 RX ADMIN — ONDANSETRON 4 MG: 2 INJECTION INTRAMUSCULAR; INTRAVENOUS at 22:07

## 2024-07-16 RX ADMIN — ACETAMINOPHEN 975 MG: 325 TABLET ORAL at 22:19

## 2024-07-16 RX ADMIN — SODIUM CHLORIDE, POTASSIUM CHLORIDE, SODIUM LACTATE AND CALCIUM CHLORIDE 1000 ML: 600; 310; 30; 20 INJECTION, SOLUTION INTRAVENOUS at 22:07

## 2024-07-16 ASSESSMENT — PAIN - FUNCTIONAL ASSESSMENT: PAIN_FUNCTIONAL_ASSESSMENT: 0-10

## 2024-07-16 ASSESSMENT — PAIN SCALES - GENERAL: PAINLEVEL_OUTOF10: 0 - NO PAIN

## 2024-07-17 LAB
BACTERIA UR CULT: ABNORMAL
C TRACH RRNA SPEC QL NAA+PROBE: NEGATIVE
CLUE CELLS SPEC QL WET PREP: NORMAL
HOLD SPECIMEN: NORMAL
N GONORRHOEA DNA SPEC QL PROBE+SIG AMP: NEGATIVE
T VAGINALIS SPEC QL WET PREP: NORMAL
WBC VAG QL WET PREP: NORMAL
YEAST VAG QL WET PREP: NORMAL

## 2024-07-17 RX ORDER — ACETAMINOPHEN 325 MG/1
650 TABLET ORAL EVERY 6 HOURS PRN
Qty: 20 TABLET | Refills: 0 | Status: SHIPPED | OUTPATIENT
Start: 2024-07-17 | End: 2024-07-22

## 2024-07-17 RX ORDER — POLYETHYLENE GLYCOL 3350 17 G/17G
17 POWDER, FOR SOLUTION ORAL DAILY
Qty: 10 PACKET | Refills: 0 | Status: SHIPPED | OUTPATIENT
Start: 2024-07-17 | End: 2024-07-31 | Stop reason: WASHOUT

## 2024-07-17 RX ORDER — ONDANSETRON 4 MG/1
4 TABLET, FILM COATED ORAL EVERY 6 HOURS
Qty: 12 TABLET | Refills: 0 | Status: SHIPPED | OUTPATIENT
Start: 2024-07-17 | End: 2024-07-20

## 2024-07-17 NOTE — ED PROVIDER NOTES
HPI   Chief Complaint   Patient presents with   • Dizziness       Shefali Arango is a 29 year old female presenting today with chief complaint of dizziness. PMHx significant for asthma, HTN, s/p cholecystectomy. Patient endorses several hours of nausea without vomiting, lightheadedness, and headache. Patient reports that she was recently admitted and treated for pneumonia and bacterial UTI (June 14, 2024) and the symptoms she is experiencing today are the same symptoms she had before she was admitted. She notes that on discharge from previous admit she never had total resolve of symptoms. She endorses that she is experiencing her lightheadedness only when she sits on the toilet to have a BM or pass gas. She has experienced this 1x today. She states when it happens she feels warm, lightheaded and she believes she will pass out. Denies episodes of LOC. She reports that she has also been having vaginal itchiness since she was discharged last time. Denies dysuria, frequency, urgency, hematuria, back pain, changes in bowel habits. LMP started 7/12, finished today. Patient reports more bleeding than normal, soaking a pad every 1-2 hours for approximately 4 days. She notes she had her mirena IUD removed 5/31 and has not been using any forms of contraception.               Patient History   Past Medical History:   Diagnosis Date   • Asthma ([facility])    • Bronchitis    • DVT (deep vein thrombosis) in pregnancy ([facility])    • Essential (primary) hypertension 10/31/2014    Elevated BP   • History of IBS    • Hx of chlamydia infection    • Hx of pulmonary embolus    • Morbid (severe) obesity due to excess calories (Multi) 12/16/2019    Morbid obesity with BMI of 50.0-59.9, adult     Past Surgical History:   Procedure Laterality Date   • CHOLECYSTECTOMY     • GALLBLADDER SURGERY  03/15/2018    Gallbladder Surgery     Family History   Problem Relation Name Age of Onset   • Heart failure Mother     • Coronary artery disease  Mother     • Hypertension Mother     • Deep vein thrombosis Mother     • Seizures Mother     • COPD Mother     • Thyroid disease Mother     • Colon cancer Maternal Grandmother     • Heart failure Maternal Grandfather     • Hypertension Maternal Grandfather     • COPD Maternal Grandfather     • Diabetes Paternal Grandmother     • Breast cancer Other       Social History     Tobacco Use   • Smoking status: Never   • Smokeless tobacco: Never   Substance Use Topics   • Alcohol use: Not Currently     Alcohol/week: 1.0 - 2.0 standard drink of alcohol     Types: 1 - 2 Glasses of wine per week   • Drug use: Yes     Types: Marijuana       Physical Exam   ED Triage Vitals [07/16/24 2025]   Temperature Heart Rate Respirations BP   36.1 °C (97 °F) 93 16 125/76      Pulse Ox Temp Source Heart Rate Source Patient Position   97 % Temporal -- --      BP Location FiO2 (%)     -- 21 %       Physical Exam  Vitals and nursing note reviewed.   Constitutional:       General: She is not in acute distress.     Appearance: She is well-developed.   HENT:      Head: Normocephalic and atraumatic.   Eyes:      Conjunctiva/sclera: Conjunctivae normal.   Cardiovascular:      Rate and Rhythm: Normal rate and regular rhythm.      Heart sounds: Normal heart sounds. No murmur heard.  Pulmonary:      Effort: Pulmonary effort is normal. No respiratory distress.      Breath sounds: Normal breath sounds and air entry.      Comments: No adventitious sounds.   Abdominal:      General: Abdomen is protuberant. Bowel sounds are normal. There is no distension.      Palpations: Abdomen is soft.      Tenderness: There is abdominal tenderness in the suprapubic area. There is no right CVA tenderness, left CVA tenderness, guarding or rebound. Negative signs include Rowan's sign, Rovsing's sign and McBurney's sign.      Hernia: No hernia is present.      Comments: Mild tenderness to deep palpation of the suprapubic area.   Musculoskeletal:         General: No  swelling.      Cervical back: Neck supple.   Skin:     General: Skin is warm and dry.      Capillary Refill: Capillary refill takes less than 2 seconds.   Neurological:      Mental Status: She is alert.   Psychiatric:         Mood and Affect: Mood normal.           ED Course & MDM   Diagnoses as of 07/17/24 0035   Light headed   Other fatigue   Constipation, unspecified constipation type                       [place] Coma Scale Score: 15                        Medical Decision Making  Shefali Arango is a 29 year old female presenting for Dizziness, nausea, and headache. PMHx significant for asthma, HTN, s/p cholecystectomy. Vitals reviewed and unremarkable. Physical exam remarkable for mild suprapubic tenderness to deep palpation. Ordered ECG d/t dizziness which was unremarkable. Started LR bolus for rehydration. Gave acetaminophen and Zofran for symptom management, patient declined Reglan and Benadryl. Ordered CBC, CMP, UA with micro/culture, magnesium, urine pregnancy test. Ordered XR chest d/t recent admission for CAP with report of repeat symptoms. Labs overall reassuring.  CBC, CMP, magnesium, troponin all within normal ranges and unremarkable.  No evidence of UTI on urinalysis.  Pregnancy test negative.  Wet prep negative.  Chest x-ray showed no acute cardiopulmonary process.  Twelve-lead EKG normal sinus rhythm and nonischemic.  On reevaluation the patient still endorsed feeling some discomfort but felt overall improved since arrival.  Discussed what she wanted to do next.  Stated that she wanted to try drinking some water.  P.o. challenge passed.  Tolerated well.  States that she has follow-up coming up at the end of the month with her primary care doctor.  Encouraged her to call them to see if they can move it up sooner.  Will give bowel regimen per request.  Advised to use only as directed.  Advised to hydrate well.  Advised to return to the ED with any new or worsening symptoms.  Patient in agreement  this plan.  Discharged in stable condition.        Procedure  Procedures     Oscar Mckeon, TOYIN-ABDIRASHID  07/17/24 0035

## 2024-07-17 NOTE — ED TRIAGE NOTES
Pt states that she is light headed, weak, hot, and nauseated.  Pt states that her symptoms started a couple of hours ago.

## 2024-07-18 ENCOUNTER — APPOINTMENT (OUTPATIENT)
Dept: CARDIOLOGY | Facility: HOSPITAL | Age: 29
End: 2024-07-18
Payer: COMMERCIAL

## 2024-07-18 ENCOUNTER — TELEPHONE (OUTPATIENT)
Dept: PHARMACY | Facility: HOSPITAL | Age: 29
End: 2024-07-18
Payer: COMMERCIAL

## 2024-07-18 LAB
ATRIAL RATE: 83 BPM
P AXIS: 48 DEGREES
P OFFSET: 209 MS
P ONSET: 160 MS
PR INTERVAL: 128 MS
Q ONSET: 224 MS
QRS COUNT: 13 BEATS
QRS DURATION: 74 MS
QT INTERVAL: 362 MS
QTC CALCULATION(BAZETT): 425 MS
QTC FREDERICIA: 403 MS
R AXIS: 57 DEGREES
T AXIS: 35 DEGREES
T OFFSET: 405 MS
VENTRICULAR RATE: 83 BPM

## 2024-07-18 NOTE — PROGRESS NOTES
EDPD Note: Rapid Result Review    Reviewed Mr./Mrs./Ms. Shefali Arango 's chart regarding a positive urine contaminate  culture/result that was taken during their recent emergency room visit. The patient was not told about these results prior to leaving the emergency department. Therefore, patient was contacted and given proper education. Patient endorsed dizziness and constipation in ER, discharged without antibiotics. Patient endorses Dysuria, Suprapubic Pain, Nausea, Vomiting, and General Malaise  and denies Fever (>99.9 F), Chills, and Flank Pain at the time of call. Advised patient to follow up with PCP, urgent care, or ED for repeat urine culture.    Susceptibility data from last 90 days.  Collected Specimen Info Organism Amoxicillin/Clavulanate Ampicillin Ampicillin/Sulbactam Cefazolin Cefazolin (uncomplicated UTIs only) Ciprofloxacin Gentamicin Nitrofurantoin Piperacillin/Tazobactam Trimethoprim/Sulfamethoxazole   05/13/24 Urine from Clean Catch/Voided Klebsiella pneumoniae/variicola  S  R  S  S  S  S  S  S  S  S       No further follow up needed from EDPD Team.     Pratima Turpin, PharmD

## 2024-07-19 ENCOUNTER — PHARMACY VISIT (OUTPATIENT)
Dept: PHARMACY | Facility: CLINIC | Age: 29
End: 2024-07-19
Payer: MEDICAID

## 2024-07-19 PROCEDURE — RXMED WILLOW AMBULATORY MEDICATION CHARGE

## 2024-07-19 RX ORDER — POLYETHYLENE GLYCOL 3350 17 G/17G
17 POWDER, FOR SOLUTION ORAL DAILY
Qty: 10 PACKET | Refills: 0 | OUTPATIENT
Start: 2024-07-17

## 2024-07-19 RX ORDER — ACETAMINOPHEN 325 MG/1
325 TABLET ORAL 4 TIMES DAILY
Qty: 20 TABLET | Refills: 0 | OUTPATIENT
Start: 2024-07-17

## 2024-07-19 RX ORDER — ONDANSETRON 4 MG/1
4 TABLET, FILM COATED ORAL 4 TIMES DAILY
Qty: 12 TABLET | Refills: 0 | OUTPATIENT
Start: 2024-07-17

## 2024-07-20 ENCOUNTER — HOSPITAL ENCOUNTER (EMERGENCY)
Facility: HOSPITAL | Age: 29
Discharge: HOME | End: 2024-07-20
Attending: EMERGENCY MEDICINE
Payer: COMMERCIAL

## 2024-07-20 ENCOUNTER — CLINICAL SUPPORT (OUTPATIENT)
Dept: EMERGENCY MEDICINE | Facility: HOSPITAL | Age: 29
End: 2024-07-20
Payer: COMMERCIAL

## 2024-07-20 VITALS
BODY MASS INDEX: 41.78 KG/M2 | RESPIRATION RATE: 18 BRPM | WEIGHT: 260 LBS | OXYGEN SATURATION: 98 % | DIASTOLIC BLOOD PRESSURE: 77 MMHG | SYSTOLIC BLOOD PRESSURE: 123 MMHG | HEIGHT: 66 IN | HEART RATE: 70 BPM | TEMPERATURE: 98.3 F

## 2024-07-20 DIAGNOSIS — K59.00 CONSTIPATION, UNSPECIFIED CONSTIPATION TYPE: ICD-10-CM

## 2024-07-20 DIAGNOSIS — J18.9 COMMUNITY ACQUIRED PNEUMONIA, UNSPECIFIED LATERALITY: ICD-10-CM

## 2024-07-20 DIAGNOSIS — R42 DIZZINESS: Primary | ICD-10-CM

## 2024-07-20 LAB
ANION GAP SERPL CALC-SCNC: 14 MMOL/L (ref 10–20)
APPEARANCE UR: CLEAR
ATRIAL RATE: 72 BPM
BASOPHILS # BLD AUTO: 0.03 X10*3/UL (ref 0–0.1)
BASOPHILS NFR BLD AUTO: 0.3 %
BILIRUB UR STRIP.AUTO-MCNC: NEGATIVE MG/DL
BUN SERPL-MCNC: 4 MG/DL (ref 6–23)
CALCIUM SERPL-MCNC: 9.4 MG/DL (ref 8.6–10.6)
CHLORIDE SERPL-SCNC: 99 MMOL/L (ref 98–107)
CO2 SERPL-SCNC: 25 MMOL/L (ref 21–32)
COLOR UR: ABNORMAL
CREAT SERPL-MCNC: 0.65 MG/DL (ref 0.5–1.05)
EGFRCR SERPLBLD CKD-EPI 2021: >90 ML/MIN/1.73M*2
EOSINOPHIL # BLD AUTO: 0.14 X10*3/UL (ref 0–0.7)
EOSINOPHIL NFR BLD AUTO: 1.3 %
ERYTHROCYTE [DISTWIDTH] IN BLOOD BY AUTOMATED COUNT: 14 % (ref 11.5–14.5)
GLUCOSE SERPL-MCNC: 93 MG/DL (ref 74–99)
GLUCOSE UR STRIP.AUTO-MCNC: NORMAL MG/DL
HCT VFR BLD AUTO: 37.6 % (ref 36–46)
HGB BLD-MCNC: 12.6 G/DL (ref 12–16)
IMM GRANULOCYTES # BLD AUTO: 0.05 X10*3/UL (ref 0–0.7)
IMM GRANULOCYTES NFR BLD AUTO: 0.4 % (ref 0–0.9)
KETONES UR STRIP.AUTO-MCNC: ABNORMAL MG/DL
LEUKOCYTE ESTERASE UR QL STRIP.AUTO: NEGATIVE
LYMPHOCYTES # BLD AUTO: 2.62 X10*3/UL (ref 1.2–4.8)
LYMPHOCYTES NFR BLD AUTO: 23.5 %
MCH RBC QN AUTO: 27.5 PG (ref 26–34)
MCHC RBC AUTO-ENTMCNC: 33.5 G/DL (ref 32–36)
MCV RBC AUTO: 82 FL (ref 80–100)
MONOCYTES # BLD AUTO: 0.56 X10*3/UL (ref 0.1–1)
MONOCYTES NFR BLD AUTO: 5 %
NEUTROPHILS # BLD AUTO: 7.76 X10*3/UL (ref 1.2–7.7)
NEUTROPHILS NFR BLD AUTO: 69.5 %
NITRITE UR QL STRIP.AUTO: NEGATIVE
NRBC BLD-RTO: 0 /100 WBCS (ref 0–0)
P AXIS: 53 DEGREES
P OFFSET: 204 MS
P ONSET: 157 MS
PH UR STRIP.AUTO: 7 [PH]
PLATELET # BLD AUTO: 377 X10*3/UL (ref 150–450)
POTASSIUM SERPL-SCNC: 4.5 MMOL/L (ref 3.5–5.3)
PR INTERVAL: 124 MS
PROT UR STRIP.AUTO-MCNC: NEGATIVE MG/DL
Q ONSET: 219 MS
QRS COUNT: 12 BEATS
QRS DURATION: 70 MS
QT INTERVAL: 384 MS
QTC CALCULATION(BAZETT): 420 MS
QTC FREDERICIA: 408 MS
R AXIS: 46 DEGREES
RBC # BLD AUTO: 4.58 X10*6/UL (ref 4–5.2)
RBC # UR STRIP.AUTO: NEGATIVE /UL
SODIUM SERPL-SCNC: 133 MMOL/L (ref 136–145)
SP GR UR STRIP.AUTO: 1.02
T AXIS: 22 DEGREES
T OFFSET: 411 MS
UROBILINOGEN UR STRIP.AUTO-MCNC: NORMAL MG/DL
VENTRICULAR RATE: 72 BPM
WBC # BLD AUTO: 11.2 X10*3/UL (ref 4.4–11.3)

## 2024-07-20 PROCEDURE — 81003 URINALYSIS AUTO W/O SCOPE: CPT | Performed by: NURSE PRACTITIONER

## 2024-07-20 PROCEDURE — 99283 EMERGENCY DEPT VISIT LOW MDM: CPT | Mod: 25

## 2024-07-20 PROCEDURE — 85025 COMPLETE CBC W/AUTO DIFF WBC: CPT

## 2024-07-20 PROCEDURE — 96360 HYDRATION IV INFUSION INIT: CPT

## 2024-07-20 PROCEDURE — 36415 COLL VENOUS BLD VENIPUNCTURE: CPT

## 2024-07-20 PROCEDURE — 80048 BASIC METABOLIC PNL TOTAL CA: CPT

## 2024-07-20 PROCEDURE — 36415 COLL VENOUS BLD VENIPUNCTURE: CPT | Performed by: NURSE PRACTITIONER

## 2024-07-20 PROCEDURE — 93005 ELECTROCARDIOGRAM TRACING: CPT

## 2024-07-20 PROCEDURE — 99284 EMERGENCY DEPT VISIT MOD MDM: CPT | Performed by: NURSE PRACTITIONER

## 2024-07-20 PROCEDURE — 2500000004 HC RX 250 GENERAL PHARMACY W/ HCPCS (ALT 636 FOR OP/ED): Mod: SE

## 2024-07-20 RX ORDER — ONDANSETRON 4 MG/1
8 TABLET, ORALLY DISINTEGRATING ORAL EVERY 6 HOURS PRN
Qty: 20 TABLET | Refills: 0 | Status: SHIPPED | OUTPATIENT
Start: 2024-07-20 | End: 2024-08-12 | Stop reason: WASHOUT

## 2024-07-20 RX ADMIN — SODIUM CHLORIDE, POTASSIUM CHLORIDE, SODIUM LACTATE AND CALCIUM CHLORIDE 1000 ML: 600; 310; 30; 20 INJECTION, SOLUTION INTRAVENOUS at 21:36

## 2024-07-20 ASSESSMENT — PAIN - FUNCTIONAL ASSESSMENT: PAIN_FUNCTIONAL_ASSESSMENT: 0-10

## 2024-07-20 ASSESSMENT — LIFESTYLE VARIABLES
HAVE PEOPLE ANNOYED YOU BY CRITICIZING YOUR DRINKING: NO
HAVE YOU EVER FELT YOU SHOULD CUT DOWN ON YOUR DRINKING: NO
EVER FELT BAD OR GUILTY ABOUT YOUR DRINKING: NO
TOTAL SCORE: 0
EVER HAD A DRINK FIRST THING IN THE MORNING TO STEADY YOUR NERVES TO GET RID OF A HANGOVER: NO

## 2024-07-20 ASSESSMENT — PAIN SCALES - GENERAL: PAINLEVEL_OUTOF10: 0 - NO PAIN

## 2024-07-20 NOTE — ED TRIAGE NOTES
Pt came in via EMS complaining of dizziness since yesterday. Pt states that she was planning to see PCP yesterday but provider ride cancelled and she told to go to ED. A&Ox3, RA.

## 2024-07-20 NOTE — ED PROVIDER NOTES
HPI   Chief Complaint   Patient presents with    Dizziness         History provided by:  Patient   used: No      29-year-old female who is well-known to our emergency department presents via EMS for evaluation of dizziness onset over 1 week ago.  Patient states that she was seen on the 16th and diagnosed with a possible UTI on urine culture but states it was contaminated and that she was told either come back to the emergency department or have her repeated by her OB/GYN or primary care provider.  She states that she had an appointment yesterday but her transportation was delayed and they end up canceling her appointment.  She was not started on an antibiotics.  She states that her symptoms feel similar to when she had a UTI in the past.  She states the dizziness is worse when she is using the bathroom and it makes her feel like she is going to pass out.  States that she feels like it radiates down to her fingertips with numbness and tingling and she is becoming anxious due to multiple recent life stressors.  She endorses associated urinary frequency and recently having her Mirena IUD removed at the end of the May, denies any vaginal discharge.  Denies any concern for pregnancy or STD but states that she does have ovarian cyst pain.  States that she has continued vaginal itching which she had on her prior visit and is following up with OB/GYN for who will also obtain an outpatient ultrasound.  Denies any trauma, fall, injury, anticoagulation use, recent travel, large crowds, long periods of immobility, smoking, drinks alcohol.  Denies any headache, vision changes, neck pain, back pain, chest pain, cough, shortness of breath, nausea, vomiting, diarrhea, abdominal pain worse than her normal pain, incontinence of bowel or bladder or any additional symptoms or complaints this time.    She states that she takes her daily medications compliantly but is not on anything for anxiety.    ROS is otherwise  negative unless stated above.    Patient History   Past Medical History:   Diagnosis Date    Asthma ([facility])     Bronchitis     DVT (deep vein thrombosis) in pregnancy ([facility])     Essential (primary) hypertension 10/31/2014    Elevated BP    History of IBS     Hx of chlamydia infection     Hx of pulmonary embolus     Morbid (severe) obesity due to excess calories (Multi) 12/16/2019    Morbid obesity with BMI of 50.0-59.9, adult     Past Surgical History:   Procedure Laterality Date    CHOLECYSTECTOMY      GALLBLADDER SURGERY  03/15/2018    Gallbladder Surgery     Family History   Problem Relation Name Age of Onset    Heart failure Mother      Coronary artery disease Mother      Hypertension Mother      Deep vein thrombosis Mother      Seizures Mother      COPD Mother      Thyroid disease Mother      Colon cancer Maternal Grandmother      Heart failure Maternal Grandfather      Hypertension Maternal Grandfather      COPD Maternal Grandfather      Diabetes Paternal Grandmother      Breast cancer Other       Social History     Tobacco Use    Smoking status: Never    Smokeless tobacco: Never   Substance Use Topics    Alcohol use: Not Currently     Alcohol/week: 1.0 - 2.0 standard drink of alcohol     Types: 1 - 2 Glasses of wine per week    Drug use: Yes     Types: Marijuana       Physical Exam   ED Triage Vitals [07/20/24 1737]   Temperature Heart Rate Respirations BP   36.4 °C (97.6 °F) 70 16 119/76      Pulse Ox Temp Source Heart Rate Source Patient Position   99 % Temporal -- --      BP Location FiO2 (%)     -- --       Physical Exam    VS: As documented in the triage note and EMR flowsheet from this visit were reviewed.    GEN: NAD, nontoxic, well-appearing, obese, ambulates without difficulty  EYES:  EOMs grossly intact, anicteric sclera, no nystagmus noted, clear and equal bilaterally, no foreign body noted  HEENT: Airway patent, ears with clear tympanic membranes bilaterally. Nasal mucosa clear. Mouth with  normal mucosa.  No area of abscess or fluctuance noted.  No drainage noted. Throat has no vesicles, no oropharyngeal exudates and uvula is midline. Face with no lymph node enlargement. No trismus or drooling noted.  Handling secretions.  Speech clear.  CARD: RRR, nontender chest, no crepitus deformities, no JVD, no murmurs rubs or gallops ; No edema noted.  Positive pulses bilaterally throughout.  Capillary refill less than 3 seconds.  No abnormal redness, warmth, tenderness or swelling noted to bilateral lower extremities.  PULMONARY: Clear all lung fields. Moving air well, Nonlabored, no accessory muscle use, able to speak complete sentences  ABDOMEN: Abdomen soft, non-distended, no rebound, no guarding. Bowel sounds normal in all 4 quadrants. No tenderness to palpation.  No CVA tenderness.  No masses or organomegaly noted.  No evidence of peritonitis. Negative Rowan's and McBurney's point tenderness.    : deferred  MUSK: Spine appears normal, range of motion is not limited, no muscle or joint tenderness. Strength 5 out of 5 equal bilaterally throughout.  No step-offs, deformities or additional signs of trauma noted.  No spinal/midline tenderness to palpation  SKIN: Skin normal color for race, warm, dry and intact. No evidence of trauma. No rash noted.  NEURO: Alert and oriented x 3, speech is clear, no obvious deficits noted. No facial droop noted.  No ataxia noted.  PSYCH: Alert and oriented to person, place, time/situation.  Mildly anxious/tearful mood and affect. No apparent risk to self or others. Thoughts are linear.  Does not appear decompensated.  Does not appear internally stimulated.  LYMPH: No adenopathy or splenomegaly. No cervical, supraclavicular or inguinal lymphadenopathy.    ED Course & MDM                        No data recorded                      Medical Decision Making    Upon assessment patient is an overweight, nontoxic-appearing female in no apparent distress.  She does appear mildly  anxious but becomes tearful because she states that she is sick of coming to the ER/hospital so frequently.  She is ambulating independently without difficulty or dyspnea.  She is placed on continuous cardiac monitoring and pulse oximetry.  She is neuro intact without any focal neurodeficits, no ataxia noted.  Assessment is benign.  I did review her workup from 4 days ago and urine culture result.  Plan is to repeat laboratory studies, urine studies and reevaluate.  She declined need for medications at this time.  I do believe that she will need to be discharged with a prescription for at least Atarax for her anxiety.  She already has proper follow-up with OB/GYN and primary care.  She will be initiated on antibiotic if indicated.  She remained hemodynamically stable throughout my ED course of care.  Findings and plan were discussed with patient and she was agreeable for plan and acknowledged understanding.  All questions and concerns were addressed prior to the end of my shift.  She was signed out to oncoming provider pending workup results and final disposition.    EKG Interpretation: N/A    Differential Diagnoses Considered: Anxiety, UTI, dehydration, electrolyte abnormality    Chronic Medical Conditions Significantly Affecting Care: PE not on anticoagulation    External Records Reviewed: I reviewed recent and relevant outside records including: PCP notes, prior discharge summary, previous radiologic studies, HIE    Independent Interpretation of Studies:  I independently interpreted: None    Escalation of Care:  Signed out to oncoming provider pending workup results and final disposition    Social Determinants of Health Significantly Affecting Care:  none    Prescription Drug Consideration: N/A    Diagnostic testing considered: Consider a CT scan of her abdomen but she has a benign abdominal exam and I do not believe that she has acute surgical abdomen -had an unremarkable CT abdomen pelvis last month;  additionally CT head was considered due to her dizziness but she has a reassuring neurologic assessment I do not believe this is a stroke, additionally she had unremarkable MRIs last month which were unremarkable; I do believe the risk of excessive radiation outweighs benefit at this time due to her reassuring assessment and hemodynamic stability.    Discussion of Management with Other Providers:   I discussed the patient/results with: Oncoming provider      This patient was staffed with ED Attending Dr. Harris to review the plan of care during ED course.    *Please note that portions of this note may have been completed with a voice recognition program.  Efforts were made to edit the dictations but occasionally, words are mis-transcribed.        Procedure  Procedures     Judith Bethea, TOYIN-CNP  07/20/24 4007

## 2024-07-20 NOTE — PROGRESS NOTES
Emergency Medicine Transition of Care Note.    I received Shefali Arango in signout from previous provider. Please see the previous ED provider note for all HPI, PE and MDM up to the time of sign-out. This is in addition to the primary record.    Diagnoses as of 07/21/24 0017   Dizziness     Medical Decision Making    Final diagnoses:   None     At the time of sign out, vital signs were as follows:  Vitals:    07/20/24 1737   BP: 119/76   Pulse: 70   Resp: 16   Temp: 36.4 °C (97.6 °F)   SpO2: 99%     In brief Shefali Arango is an 29 y.o. female presenting for possible UTI    Patient was signed out to me pending labs and urinalysis.  Patient urinalysis negative for infection.  Labs were nonconcerning.  Patient states that her dizziness is worse when in the sitting from standing position.  Patient states that she has not been eating within the past couple days with the symptoms started due to the passing of her mom.  Patient was given a liter of fluid and was discharged to follow-up with her PCP, GI and OB/GYN as patient states that she feels comfortable and wants to go home and will follow-up outpatient      Dispo: Discharged    Camryn Juan MD  Emergency Medicine, PGY-1

## 2024-07-21 LAB — HOLD SPECIMEN: NORMAL

## 2024-07-25 PROBLEM — R51.9 HEADACHE: Status: ACTIVE | Noted: 2024-07-25

## 2024-07-25 PROBLEM — R07.0 THROAT PAIN: Status: ACTIVE | Noted: 2024-07-25

## 2024-07-25 PROBLEM — O09.899: Status: ACTIVE | Noted: 2024-07-25

## 2024-07-25 PROBLEM — R52 GENERALIZED PAIN: Status: ACTIVE | Noted: 2024-07-25

## 2024-07-25 PROBLEM — R10.2 PELVIC PAIN IN FEMALE: Status: ACTIVE | Noted: 2024-07-25

## 2024-07-25 PROBLEM — M79.606 PAIN IN LOWER LIMB: Status: ACTIVE | Noted: 2024-07-25

## 2024-07-25 PROBLEM — E53.8 FOLIC ACID DEFICIENCY: Status: ACTIVE | Noted: 2024-07-25

## 2024-07-25 PROBLEM — J02.9 PHARYNGITIS: Status: ACTIVE | Noted: 2024-07-25

## 2024-07-25 PROBLEM — R76.8 RED BLOOD CELL ANTIBODY POSITIVE: Status: ACTIVE | Noted: 2024-07-25

## 2024-07-25 PROBLEM — N93.9 VAGINAL BLEEDING: Status: ACTIVE | Noted: 2024-07-25

## 2024-07-25 PROBLEM — O99.810 PREGNANCY WITH ABNORMAL GLUCOSE TOLERANCE TEST (GTT) (HHS-HCC): Status: ACTIVE | Noted: 2024-07-25

## 2024-07-25 PROBLEM — R19.7 DIARRHEA OF PRESUMED INFECTIOUS ORIGIN: Status: ACTIVE | Noted: 2018-12-06

## 2024-07-25 PROBLEM — M54.9 BACK PAIN: Status: ACTIVE | Noted: 2024-07-25

## 2024-07-25 PROBLEM — Z90.49 HISTORY OF CHOLECYSTECTOMY: Status: ACTIVE | Noted: 2024-07-25

## 2024-07-25 PROBLEM — J45.909 MATERNAL ASTHMA COMPLICATING PREGNANCY (HHS-HCC): Status: ACTIVE | Noted: 2024-07-25

## 2024-07-25 PROBLEM — B37.31 CANDIDIASIS OF VAGINA: Status: ACTIVE | Noted: 2024-07-25

## 2024-07-25 PROBLEM — O14.90 PRE-ECLAMPSIA (HHS-HCC): Status: ACTIVE | Noted: 2020-02-13

## 2024-07-25 PROBLEM — N89.8 VAGINAL ITCHING: Status: ACTIVE | Noted: 2024-07-25

## 2024-07-25 PROBLEM — R06.00 DYSPNEA: Status: ACTIVE | Noted: 2024-07-25

## 2024-07-25 PROBLEM — O99.519 MATERNAL ASTHMA COMPLICATING PREGNANCY (HHS-HCC): Status: ACTIVE | Noted: 2024-07-25

## 2024-07-25 PROBLEM — D50.9 MATERNAL IRON DEFICIENCY ANEMIA AFFECTING PREGNANCY, ANTEPARTUM (HHS-HCC): Status: ACTIVE | Noted: 2024-07-25

## 2024-07-25 PROBLEM — G47.00 INSOMNIA: Status: ACTIVE | Noted: 2024-07-25

## 2024-07-25 PROBLEM — R10.33 PERIUMBILICAL ABDOMINAL PAIN: Status: ACTIVE | Noted: 2018-12-06

## 2024-07-25 PROBLEM — S39.012A STRAIN OF LUMBAR REGION: Status: ACTIVE | Noted: 2024-07-25

## 2024-07-25 PROBLEM — R07.9 CHEST PAIN: Status: ACTIVE | Noted: 2024-07-25

## 2024-07-25 PROBLEM — R68.83 CHILL: Status: ACTIVE | Noted: 2024-07-25

## 2024-07-25 PROBLEM — S69.90XA INJURY OF WRIST: Status: ACTIVE | Noted: 2024-07-25

## 2024-07-25 PROBLEM — O99.019 MATERNAL IRON DEFICIENCY ANEMIA AFFECTING PREGNANCY, ANTEPARTUM (HHS-HCC): Status: ACTIVE | Noted: 2024-07-25

## 2024-07-25 PROBLEM — Z86.711 HISTORY OF PULMONARY EMBOLISM: Status: ACTIVE | Noted: 2024-07-25

## 2024-07-25 PROBLEM — U07.1 DISEASE DUE TO SEVERE ACUTE RESPIRATORY SYNDROME CORONAVIRUS 2 (SARS-COV-2): Status: ACTIVE | Noted: 2024-07-25

## 2024-07-25 PROBLEM — K52.9 ENTERITIS: Status: ACTIVE | Noted: 2024-07-25

## 2024-07-25 PROBLEM — B34.9 VIRAL INFECTION: Status: ACTIVE | Noted: 2024-07-25

## 2024-07-25 PROBLEM — N89.8 VAGINAL DISCHARGE: Status: ACTIVE | Noted: 2024-07-25

## 2024-07-25 PROBLEM — O16.9 HYPERTENSION IN PREGNANCY, ANTEPARTUM (HHS-HCC): Status: ACTIVE | Noted: 2024-07-25

## 2024-07-25 PROBLEM — R80.9 PROTEINURIA: Status: ACTIVE | Noted: 2024-07-25

## 2024-07-25 PROBLEM — R10.9 ABDOMINAL PAIN: Status: ACTIVE | Noted: 2024-07-25

## 2024-07-31 ENCOUNTER — OFFICE VISIT (OUTPATIENT)
Dept: PRIMARY CARE | Facility: CLINIC | Age: 29
End: 2024-07-31
Payer: COMMERCIAL

## 2024-07-31 VITALS
RESPIRATION RATE: 16 BRPM | OXYGEN SATURATION: 98 % | SYSTOLIC BLOOD PRESSURE: 120 MMHG | TEMPERATURE: 98.2 F | HEART RATE: 91 BPM | BODY MASS INDEX: 51.91 KG/M2 | DIASTOLIC BLOOD PRESSURE: 81 MMHG | WEIGHT: 293 LBS | HEIGHT: 63 IN

## 2024-07-31 DIAGNOSIS — R11.2 NAUSEA AND VOMITING, UNSPECIFIED VOMITING TYPE: ICD-10-CM

## 2024-07-31 DIAGNOSIS — J45.20 MILD INTERMITTENT ASTHMA WITHOUT COMPLICATION (HHS-HCC): ICD-10-CM

## 2024-07-31 DIAGNOSIS — E66.01 OBESITY, MORBID, BMI 50 OR HIGHER (MULTI): Primary | ICD-10-CM

## 2024-07-31 DIAGNOSIS — R55 PRE-SYNCOPE: ICD-10-CM

## 2024-07-31 DIAGNOSIS — G43.101 MIGRAINE WITH AURA AND WITH STATUS MIGRAINOSUS, NOT INTRACTABLE: ICD-10-CM

## 2024-07-31 PROCEDURE — 99215 OFFICE O/P EST HI 40 MIN: CPT | Performed by: INTERNAL MEDICINE

## 2024-07-31 PROCEDURE — 3079F DIAST BP 80-89 MM HG: CPT | Performed by: INTERNAL MEDICINE

## 2024-07-31 PROCEDURE — 99215 OFFICE O/P EST HI 40 MIN: CPT | Mod: GC | Performed by: INTERNAL MEDICINE

## 2024-07-31 PROCEDURE — 1036F TOBACCO NON-USER: CPT | Performed by: INTERNAL MEDICINE

## 2024-07-31 PROCEDURE — 3008F BODY MASS INDEX DOCD: CPT | Performed by: INTERNAL MEDICINE

## 2024-07-31 PROCEDURE — 3074F SYST BP LT 130 MM HG: CPT | Performed by: INTERNAL MEDICINE

## 2024-07-31 RX ORDER — ONDANSETRON 4 MG/1
4 TABLET, FILM COATED ORAL 4 TIMES DAILY
Qty: 90 TABLET | Refills: 1 | Status: SHIPPED | OUTPATIENT
Start: 2024-07-31

## 2024-07-31 SDOH — ECONOMIC STABILITY: FOOD INSECURITY: WITHIN THE PAST 12 MONTHS, YOU WORRIED THAT YOUR FOOD WOULD RUN OUT BEFORE YOU GOT MONEY TO BUY MORE.: NEVER TRUE

## 2024-07-31 SDOH — ECONOMIC STABILITY: FOOD INSECURITY: WITHIN THE PAST 12 MONTHS, THE FOOD YOU BOUGHT JUST DIDN'T LAST AND YOU DIDN'T HAVE MONEY TO GET MORE.: NEVER TRUE

## 2024-07-31 ASSESSMENT — PAIN SCALES - GENERAL: PAINLEVEL: 0-NO PAIN

## 2024-07-31 ASSESSMENT — PATIENT HEALTH QUESTIONNAIRE - PHQ9
SUM OF ALL RESPONSES TO PHQ9 QUESTIONS 1 AND 2: 0
1. LITTLE INTEREST OR PLEASURE IN DOING THINGS: NOT AT ALL
2. FEELING DOWN, DEPRESSED OR HOPELESS: NOT AT ALL

## 2024-07-31 ASSESSMENT — ENCOUNTER SYMPTOMS
LOSS OF SENSATION IN FEET: 0
OCCASIONAL FEELINGS OF UNSTEADINESS: 0
DEPRESSION: 0

## 2024-07-31 NOTE — PROGRESS NOTES
I saw and evaluated the patient. I personally obtained the key and critical portions of the history and physical exam or was physically present for key and critical portions performed by the resident/fellow. I reviewed the resident/fellow's documentation and discussed the patient with the resident/fellow. I agree with the resident/fellow's medical decision making as documented in the note.    Katja Jack MD

## 2024-07-31 NOTE — PROGRESS NOTES
Subjective   Shefali Arango is a 29 y.o. female w/ pmhx of Asthma, DVT/PE (s/p eliquis at age of 26), IBS, HTN who presents for Follow-up.    HPI   Patient initially presented on 6/2024 to establish care. During this visit she reported pre-syncopal symptoms. Since then she has had multiple ED visits. 6/2024 she was hospitalized  for community acquired pneumonia. During this visit wilfred reported dizziness/headache and for which MRI/MRV was ordered but, was unremarkable. Neurology was consulted and felt that her symptoms were related to migraines. She ultimately was referred to audiology and vestibular rehab for her symptoms. She then represented on 7/20 to ED for dizziness. Labwork was unremarkable.    Since then she reports that her symptom have mostly improved. She still has sensation of passing out/flushing when defecating. She reported that she has some constipation but, has not been taking Miralax at home out of fear of syncopal event. She also reported lightheadedness when getting up. She previously felt symptoms as if room was spinning. She otherwise denied headache, visual symptoms or issues with equilibrium.     Past Medical History:   Diagnosis Date    Asthma ([facility])     Bronchitis     DVT (deep vein thrombosis) in pregnancy ([facility])     Essential (primary) hypertension 10/31/2014    Elevated BP    History of IBS     Hx of chlamydia infection     Hx of pulmonary embolus     Morbid (severe) obesity due to excess calories (Multi) 12/16/2019    Morbid obesity with BMI of 50.0-59.9, adult       Past Surgical History:   Procedure Laterality Date    CHOLECYSTECTOMY      GALLBLADDER SURGERY  03/15/2018    Gallbladder Surgery     Current Outpatient Medications   Medication Instructions    acetaminophen (TYLENOL) 650 mg, oral, Every 6 hours PRN    albuterol 180 mcg, inhalation, Every 6 hours PRN    budesonide (Pulmicort) 90 mcg/actuation inhaler 1 puff, inhalation, 2 times daily PRN, Rinse mouth with water  "after use to reduce aftertaste and incidence of candidiasis. Do not swallow.    ondansetron (Zofran) 4 mg tablet Take 1 tablet (4 mg) by mouth every 6 hours for 3 days.    ondansetron ODT (ZOFRAN-ODT) 8 mg, oral, Every 6 hours PRN    polyethylene glycol (Glycolax, Miralax) 17 gram packet Take 17 g (mix 1 packet in 8 oz of liquid) and drink by mouth once daily for ten days.    topiramate (Topamax) 25 mg tablet Take 1 tablet (25 mg) by mouth once daily at bedtime for 6 days, THEN 2 tablets (50 mg) once daily at bedtime for 24 days. Please obtain refill from your primary care.     Allergies   Allergen Reactions    Ketorolac Other     Other reaction(s): Intolerance    Prochlorperazine Other     Gets restless/anxious    Tramadol Other     Makes her feel like shes having an anxiety attack     Social History     Tobacco Use    Smoking status: Never    Smokeless tobacco: Never   Substance Use Topics    Alcohol use: Not Currently     Alcohol/week: 1.0 - 2.0 standard drink of alcohol     Types: 1 - 2 Glasses of wine per week    Drug use: Yes     Types: Marijuana     Family History   Problem Relation Name Age of Onset    Heart failure Mother      Coronary artery disease Mother      Hypertension Mother      Deep vein thrombosis Mother      Seizures Mother      COPD Mother      Thyroid disease Mother      Colon cancer Maternal Grandmother      Heart failure Maternal Grandfather      Hypertension Maternal Grandfather      COPD Maternal Grandfather      Diabetes Paternal Grandmother      Breast cancer Other           Review of Systems   All other systems reviewed and are negative.      Objective   /81   Pulse 91   Temp 36.8 °C (98.2 °F)   Resp 16   Ht 1.6 m (5' 3\")   Wt 133 kg (293 lb)   LMP 07/12/2024   SpO2 98%   BMI 51.90 kg/m²     Physical Exam  HENT:      Head: Atraumatic.      Mouth/Throat:      Mouth: Mucous membranes are moist.      Pharynx: Oropharynx is clear.   Eyes:      Extraocular Movements: Extraocular " movements intact.      Pupils: Pupils are equal, round, and reactive to light.   Cardiovascular:      Rate and Rhythm: Normal rate and regular rhythm.   Pulmonary:      Effort: Pulmonary effort is normal.      Breath sounds: Normal breath sounds. No wheezing, rhonchi or rales.   Abdominal:      General: Abdomen is flat. Bowel sounds are normal. There is no distension.      Palpations: Abdomen is soft.      Tenderness: There is no abdominal tenderness. There is no rebound.   Skin:     General: Skin is warm and dry.   Neurological:      General: No focal deficit present.      Mental Status: She is oriented to person, place, and time. Mental status is at baseline.         Assessment/Plan   Presyncopal symptoms  N/V  :: Possibly Carcinoid syndrome vs vaso-vagal related to straining  ::CT-A/P (6/2024)- no abdominopelvic findings  ::MRI/MRV(6/2024)- negative  -Will be evaluated by GI   -Neuro referral  -Zofran    Asthma  -PFTs pending  -C/w Asthma PRN  -Not taking ICS    Obesity  -Check A1c, lipids at next visit    C/f Sleep Apnea  -STOPBANG 4, Sleep Study pending, reordered for bathroom accommodations     STI screening  STI screening recently completed, will defer    Health maintenance: UTD on childhood vaccines. PHQ2 negative. STI screening done 5/2024. Pap smear (-) 5/2024, Will discuss mammogram at next visit    RTC in 6 months           Pt received semi-supine, +tube feed, all lines/tubes intact, NAD. HR: 93 beats per minute. pt's grandson at bedside throughout

## 2024-08-05 NOTE — PROGRESS NOTES
Shefali Arango is a 29 y.o. female with past medical history of HTN, asthma, DVT/PE (s/p eliquis at age of 26), IBS, and obesity who is referred by Dr. Flip Funez for nausea and vomiting. She states she felt fine several months ago. Then earlier this Summer she developed a variety of symptoms including dizziness, pre-syncope, nausea, and vomiting. She presented to ER 7/20/2024. CT showed no acute abdominopelvic pathology. Thought to have UTI. She was given antibiotics and improved some. No longer vomiting but still feels very light headed. This occurs daily and seems to be worse anytime she goes to pass gas or have a bowel movement. She will be light headed, get hot, and develop nausea. Zofran helps sometimes but she often feels she cannot eat. Unsure if she has lost weight. Had MRI Brain that was unremarkable. Plans to see Neurology. She also endorses increased heartburn/reflux that is worse at night. Taking TUMS.    Bowels sometimes move daily and sometimes will go 4 days without BM. Sometimes loose and sometimes formed. Occasionally will only pass a small amount. No blood in stool. CBC and BMP reassuring. X-ray showed no constipation. CT reassuring with normal bowel. She does not take NSAIDS regularly. No prior EGD or colonoscopy.    Family history: Mother had GI issues but unsure exact diagnosis. Denies family history of colon cancer or other GI disorders or malignancy.     Past Medical History:   Diagnosis Date    Asthma (HHS-HCC)     Bronchitis     DVT (deep vein thrombosis) in pregnancy (HHS-HCC)     Essential (primary) hypertension 10/31/2014    Elevated BP    History of IBS     Hx of chlamydia infection     Hx of pulmonary embolus     Morbid (severe) obesity due to excess calories (Multi) 12/16/2019    Morbid obesity with BMI of 50.0-59.9, adult     Past Surgical History:   Procedure Laterality Date    CHOLECYSTECTOMY      GALLBLADDER SURGERY  03/15/2018    Gallbladder Surgery     Current  Outpatient Medications   Medication Sig Dispense Refill    acetaminophen (TylenoL) 325 mg tablet Take 2 tablets (650 mg) by mouth every 6 hours if needed for mild pain (1 - 3) or fever (temp greater than 38.0 C). 30 tablet 0    albuterol 90 mcg/actuation aerosol powdr breath activated inhaler Inhale 2 puffs every 6 hours if needed for wheezing.      ondansetron (Zofran) 4 mg tablet Take 1 tablet (4 mg) by mouth every 6 hours for 3 days. 90 tablet 1    budesonide (Pulmicort) 90 mcg/actuation inhaler Inhale 1 puff 2 times a day as needed (with albuterol for wheezing). Rinse mouth with water after use to reduce aftertaste and incidence of candidiasis. Do not swallow. (Patient not taking: Reported on 7/31/2024) 1 each 1    ondansetron ODT (Zofran-ODT) 4 mg disintegrating tablet Take 2 tablets (8 mg) by mouth every 6 hours if needed for nausea or vomiting. (Patient not taking: Reported on 8/12/2024) 20 tablet 0    polyethylene glycol (Glycolax, Miralax) 17 gram packet Take 17 g (mix 1 packet in 8 oz of liquid) and drink by mouth once daily for ten days. (Patient not taking: Reported on 7/31/2024) 10 packet 0    topiramate (Topamax) 25 mg tablet Take 1 tablet (25 mg) by mouth once daily at bedtime for 6 days, THEN 2 tablets (50 mg) once daily at bedtime for 24 days. Please obtain refill from your primary care. (Patient not taking: Reported on 8/12/2024) 54 tablet 0     No current facility-administered medications for this visit.     Allergies   Allergen Reactions    Ketorolac Other     Other reaction(s): Intolerance    Prochlorperazine Other     Gets restless/anxious    Tramadol Other     Makes her feel like shes having an anxiety attack       Family History   Problem Relation Name Age of Onset    Heart failure Mother      Coronary artery disease Mother      Hypertension Mother      Deep vein thrombosis Mother      Seizures Mother      COPD Mother      Thyroid disease Mother      Colon cancer Maternal Grandmother       "Heart failure Maternal Grandfather      Hypertension Maternal Grandfather      COPD Maternal Grandfather      Diabetes Paternal Grandmother      Breast cancer Other         Review of Systems  Review of Systems negative except as noted in HPI.    Objective     /79   Pulse 89   Temp 36.1 °C (97 °F)   Ht 1.6 m (5' 3\")   Wt 129 kg (285 lb)   LMP 07/12/2024   SpO2 96%   BMI 50.49 kg/m²      Physical Exam  Constitutional:  No acute distress. Normal appearance. Not ill-appearing.  HENT:  Head normocephalic and atraumatic. Conjunctivae normal.  Cardiovascular:  Normal rate. Regular rhythm.  Pulmonary:  Pulmonary effort normal. No respiratory distress. Breath sounds clear.  Abdominal:  Abdomen is soft. There is no distension. No tenderness or guarding.  Skin: Dry.  Neurological:  Alert and oriented.  Psychiatric:  Mood and affect normal.    Assessment/Plan     29 y.o. female with history of HTN, asthma, DVT/PE (s/p eliquis at age of 26), IBS, and obesity who presents today for initial clinic visit for several month history of lightheadedness, dizziness, and nausea. Her symptoms occur daily and have been interfering with her daily routine. Recent CT scan abdomen/pelvis and MRI brain reassuring. At this point in time unsure if her symptoms are due to a GI etiology, however we discussed ruling out H. Pylori and starting PPI therapy for possible gastritis. We will also exclude gastroparesis.     Recommendations  Obtain labs and H. Pylori stool test and fecal calprotectin.  Start Prilosec 40 mg daily for 8 weeks on empty stomach.  Obtain gastric emptying study to exclude gastroparesis.   Follow up in 8 weeks.   Follow up Neurologist about being tested for POTS.    Electronically signed by: Apoorva Christensen CNP on 8/12/2024 at 3:40 PM      "

## 2024-08-12 ENCOUNTER — OFFICE VISIT (OUTPATIENT)
Dept: GASTROENTEROLOGY | Facility: HOSPITAL | Age: 29
End: 2024-08-12
Payer: COMMERCIAL

## 2024-08-12 VITALS
BODY MASS INDEX: 50.5 KG/M2 | HEIGHT: 63 IN | OXYGEN SATURATION: 96 % | HEART RATE: 89 BPM | TEMPERATURE: 97 F | DIASTOLIC BLOOD PRESSURE: 79 MMHG | WEIGHT: 285 LBS | SYSTOLIC BLOOD PRESSURE: 151 MMHG

## 2024-08-12 DIAGNOSIS — R10.9 ABDOMINAL PAIN, UNSPECIFIED ABDOMINAL LOCATION: ICD-10-CM

## 2024-08-12 DIAGNOSIS — R11.2 NAUSEA AND VOMITING, UNSPECIFIED VOMITING TYPE: Primary | ICD-10-CM

## 2024-08-12 DIAGNOSIS — R42 LIGHTHEADEDNESS: ICD-10-CM

## 2024-08-12 PROCEDURE — 3008F BODY MASS INDEX DOCD: CPT | Performed by: NURSE PRACTITIONER

## 2024-08-12 PROCEDURE — 99244 OFF/OP CNSLTJ NEW/EST MOD 40: CPT | Performed by: NURSE PRACTITIONER

## 2024-08-12 PROCEDURE — 3077F SYST BP >= 140 MM HG: CPT | Performed by: NURSE PRACTITIONER

## 2024-08-12 PROCEDURE — 3078F DIAST BP <80 MM HG: CPT | Performed by: NURSE PRACTITIONER

## 2024-08-12 RX ORDER — OMEPRAZOLE 40 MG/1
40 CAPSULE, DELAYED RELEASE ORAL DAILY
Qty: 90 CAPSULE | Refills: 3 | Status: SHIPPED | OUTPATIENT
Start: 2024-08-12 | End: 2025-08-12

## 2024-08-12 RX ORDER — ONDANSETRON 4 MG/1
4 TABLET, ORALLY DISINTEGRATING ORAL EVERY 8 HOURS PRN
Qty: 15 TABLET | Refills: 0 | Status: SHIPPED | OUTPATIENT
Start: 2024-08-12

## 2024-08-12 ASSESSMENT — PAIN SCALES - GENERAL: PAINLEVEL: 7

## 2024-08-12 NOTE — PATIENT INSTRUCTIONS
Recommendations  Obtain labs and H. Pylori stool test and fecal calprotectin.  Start Prilosec daily for 8 weeks on empty stomach.  Obtain gastric emptying study to exclude gastroparesis. Imaging will reach out to you to schedule.  Follow up in 8 weeks. Please call the office at [phone] with any questions or concerns.   Follow up Neurologist about being tested for POTS.

## 2024-09-11 ENCOUNTER — APPOINTMENT (OUTPATIENT)
Dept: RADIOLOGY | Facility: HOSPITAL | Age: 29
End: 2024-09-11
Payer: COMMERCIAL

## 2024-09-11 ENCOUNTER — CLINICAL SUPPORT (OUTPATIENT)
Dept: EMERGENCY MEDICINE | Facility: HOSPITAL | Age: 29
End: 2024-09-11
Payer: COMMERCIAL

## 2024-09-11 ENCOUNTER — HOSPITAL ENCOUNTER (EMERGENCY)
Facility: HOSPITAL | Age: 29
Discharge: HOME | End: 2024-09-11
Payer: COMMERCIAL

## 2024-09-11 VITALS
HEIGHT: 63 IN | TEMPERATURE: 97.5 F | BODY MASS INDEX: 46.07 KG/M2 | OXYGEN SATURATION: 95 % | RESPIRATION RATE: 16 BRPM | DIASTOLIC BLOOD PRESSURE: 68 MMHG | SYSTOLIC BLOOD PRESSURE: 111 MMHG | HEART RATE: 81 BPM | WEIGHT: 260 LBS

## 2024-09-11 DIAGNOSIS — R10.30 LOWER ABDOMINAL PAIN: ICD-10-CM

## 2024-09-11 DIAGNOSIS — O36.80X0 PREGNANCY OF UNKNOWN ANATOMIC LOCATION (HHS-HCC): Primary | ICD-10-CM

## 2024-09-11 DIAGNOSIS — Z67.91 RH NEGATIVE STATUS DURING PREGNANCY IN FIRST TRIMESTER (HHS-HCC): ICD-10-CM

## 2024-09-11 DIAGNOSIS — O26.891 RH NEGATIVE STATUS DURING PREGNANCY IN FIRST TRIMESTER (HHS-HCC): ICD-10-CM

## 2024-09-11 LAB
ABO GROUP (TYPE) IN BLOOD: NORMAL
ALBUMIN SERPL BCP-MCNC: 4 G/DL (ref 3.4–5)
ALP SERPL-CCNC: 67 U/L (ref 33–110)
ALT SERPL W P-5'-P-CCNC: 10 U/L (ref 7–45)
ANION GAP SERPL CALC-SCNC: 16 MMOL/L (ref 10–20)
ANTIBODY SCREEN: NORMAL
APPEARANCE UR: CLEAR
AST SERPL W P-5'-P-CCNC: 9 U/L (ref 9–39)
B-HCG SERPL-ACNC: 64 MIU/ML
BASOPHILS # BLD AUTO: 0.03 X10*3/UL (ref 0–0.1)
BASOPHILS NFR BLD AUTO: 0.3 %
BILIRUB SERPL-MCNC: 0.5 MG/DL (ref 0–1.2)
BILIRUB UR STRIP.AUTO-MCNC: NEGATIVE MG/DL
BUN SERPL-MCNC: 7 MG/DL (ref 6–23)
C TRACH RRNA SPEC QL NAA+PROBE: NEGATIVE
CALCIUM SERPL-MCNC: 9.6 MG/DL (ref 8.6–10.6)
CHLORIDE SERPL-SCNC: 103 MMOL/L (ref 98–107)
CLUE CELLS SPEC QL WET PREP: NORMAL
CO2 SERPL-SCNC: 21 MMOL/L (ref 21–32)
COLOR UR: NORMAL
CREAT SERPL-MCNC: 0.64 MG/DL (ref 0.5–1.05)
EGFRCR SERPLBLD CKD-EPI 2021: >90 ML/MIN/1.73M*2
EOSINOPHIL # BLD AUTO: 0.21 X10*3/UL (ref 0–0.7)
EOSINOPHIL NFR BLD AUTO: 1.9 %
ERYTHROCYTE [DISTWIDTH] IN BLOOD BY AUTOMATED COUNT: 13.5 % (ref 11.5–14.5)
GLUCOSE SERPL-MCNC: 106 MG/DL (ref 74–99)
GLUCOSE UR STRIP.AUTO-MCNC: NORMAL MG/DL
HCT VFR BLD AUTO: 42.4 % (ref 36–46)
HCV AB SER QL: NONREACTIVE
HGB BLD-MCNC: 13.4 G/DL (ref 12–16)
HIV 1+2 AB+HIV1 P24 AG SERPL QL IA: NONREACTIVE
IMM GRANULOCYTES # BLD AUTO: 0.16 X10*3/UL (ref 0–0.7)
IMM GRANULOCYTES NFR BLD AUTO: 1.5 % (ref 0–0.9)
KETONES UR STRIP.AUTO-MCNC: NEGATIVE MG/DL
LEUKOCYTE ESTERASE UR QL STRIP.AUTO: NEGATIVE
LYMPHOCYTES # BLD AUTO: 1.44 X10*3/UL (ref 1.2–4.8)
LYMPHOCYTES NFR BLD AUTO: 13.1 %
MCH RBC QN AUTO: 27.7 PG (ref 26–34)
MCHC RBC AUTO-ENTMCNC: 31.6 G/DL (ref 32–36)
MCV RBC AUTO: 88 FL (ref 80–100)
MONOCYTES # BLD AUTO: 0.45 X10*3/UL (ref 0.1–1)
MONOCYTES NFR BLD AUTO: 4.1 %
N GONORRHOEA DNA SPEC QL PROBE+SIG AMP: NEGATIVE
NEUTROPHILS # BLD AUTO: 8.73 X10*3/UL (ref 1.2–7.7)
NEUTROPHILS NFR BLD AUTO: 79.1 %
NITRITE UR QL STRIP.AUTO: NEGATIVE
NRBC BLD-RTO: 0 /100 WBCS (ref 0–0)
PH UR STRIP.AUTO: 6 [PH]
PLATELET # BLD AUTO: 356 X10*3/UL (ref 150–450)
POTASSIUM SERPL-SCNC: 3.7 MMOL/L (ref 3.5–5.3)
PREGNANCY TEST URINE, POC: POSITIVE
PROT SERPL-MCNC: 8.7 G/DL (ref 6.4–8.2)
PROT UR STRIP.AUTO-MCNC: NEGATIVE MG/DL
RBC # BLD AUTO: 4.83 X10*6/UL (ref 4–5.2)
RBC # UR STRIP.AUTO: NEGATIVE /UL
RH FACTOR (ANTIGEN D): NORMAL
S PYO DNA THROAT QL NAA+PROBE: NOT DETECTED
SARS-COV-2 RNA RESP QL NAA+PROBE: NOT DETECTED
SODIUM SERPL-SCNC: 136 MMOL/L (ref 136–145)
SP GR UR STRIP.AUTO: 1.02
T VAGINALIS SPEC QL WET PREP: NORMAL
TREPONEMA PALLIDUM IGG+IGM AB [PRESENCE] IN SERUM OR PLASMA BY IMMUNOASSAY: NONREACTIVE
TRICHOMONAS REFLEX COMMENT: NORMAL
UROBILINOGEN UR STRIP.AUTO-MCNC: NORMAL MG/DL
WBC # BLD AUTO: 11 X10*3/UL (ref 4.4–11.3)
WBC VAG QL WET PREP: NORMAL
YEAST VAG QL WET PREP: NORMAL

## 2024-09-11 PROCEDURE — 81025 URINE PREGNANCY TEST: CPT | Performed by: PHYSICIAN ASSISTANT

## 2024-09-11 PROCEDURE — 87491 CHLMYD TRACH DNA AMP PROBE: CPT | Performed by: PHYSICIAN ASSISTANT

## 2024-09-11 PROCEDURE — 99285 EMERGENCY DEPT VISIT HI MDM: CPT | Performed by: PHYSICIAN ASSISTANT

## 2024-09-11 PROCEDURE — 87389 HIV-1 AG W/HIV-1&-2 AB AG IA: CPT | Performed by: PHYSICIAN ASSISTANT

## 2024-09-11 PROCEDURE — 81003 URINALYSIS AUTO W/O SCOPE: CPT | Performed by: PHYSICIAN ASSISTANT

## 2024-09-11 PROCEDURE — 93005 ELECTROCARDIOGRAM TRACING: CPT

## 2024-09-11 PROCEDURE — 71046 X-RAY EXAM CHEST 2 VIEWS: CPT

## 2024-09-11 PROCEDURE — 86901 BLOOD TYPING SEROLOGIC RH(D): CPT | Performed by: PHYSICIAN ASSISTANT

## 2024-09-11 PROCEDURE — 87635 SARS-COV-2 COVID-19 AMP PRB: CPT | Performed by: PHYSICIAN ASSISTANT

## 2024-09-11 PROCEDURE — 71046 X-RAY EXAM CHEST 2 VIEWS: CPT | Mod: FOREIGN READ | Performed by: RADIOLOGY

## 2024-09-11 PROCEDURE — 87210 SMEAR WET MOUNT SALINE/INK: CPT | Mod: 59 | Performed by: PHYSICIAN ASSISTANT

## 2024-09-11 PROCEDURE — 36415 COLL VENOUS BLD VENIPUNCTURE: CPT | Performed by: PHYSICIAN ASSISTANT

## 2024-09-11 PROCEDURE — 87661 TRICHOMONAS VAGINALIS AMPLIF: CPT | Performed by: PHYSICIAN ASSISTANT

## 2024-09-11 PROCEDURE — 99285 EMERGENCY DEPT VISIT HI MDM: CPT | Mod: 25

## 2024-09-11 PROCEDURE — 76856 US EXAM PELVIC COMPLETE: CPT

## 2024-09-11 PROCEDURE — 84702 CHORIONIC GONADOTROPIN TEST: CPT | Performed by: PHYSICIAN ASSISTANT

## 2024-09-11 PROCEDURE — 85025 COMPLETE CBC W/AUTO DIFF WBC: CPT | Performed by: PHYSICIAN ASSISTANT

## 2024-09-11 PROCEDURE — 87651 STREP A DNA AMP PROBE: CPT | Performed by: PHYSICIAN ASSISTANT

## 2024-09-11 PROCEDURE — 80053 COMPREHEN METABOLIC PANEL: CPT | Performed by: PHYSICIAN ASSISTANT

## 2024-09-11 PROCEDURE — 93010 ELECTROCARDIOGRAM REPORT: CPT | Performed by: PHYSICIAN ASSISTANT

## 2024-09-11 PROCEDURE — 86803 HEPATITIS C AB TEST: CPT | Performed by: PHYSICIAN ASSISTANT

## 2024-09-11 PROCEDURE — 86780 TREPONEMA PALLIDUM: CPT | Performed by: PHYSICIAN ASSISTANT

## 2024-09-11 RX ORDER — ACETAMINOPHEN 325 MG/1
975 TABLET ORAL ONCE
Status: DISCONTINUED | OUTPATIENT
Start: 2024-09-11 | End: 2024-09-11 | Stop reason: HOSPADM

## 2024-09-11 ASSESSMENT — PAIN SCALES - GENERAL
PAINLEVEL_OUTOF10: 0 - NO PAIN
PAINLEVEL_OUTOF10: 0 - NO PAIN

## 2024-09-11 ASSESSMENT — PAIN - FUNCTIONAL ASSESSMENT: PAIN_FUNCTIONAL_ASSESSMENT: 0-10

## 2024-09-11 NOTE — ED TRIAGE NOTES
Pt to ED with c/o flu symptoms x 4 days. Pt endorsing body aches, fevers,chills, sore throat, cough, N/V. Hx Asthma/bronchitis.

## 2024-09-11 NOTE — SIGNIFICANT EVENT
HPI:   Shefali Arango is a 29 y.o.  female who presented to the ED for URI symptoms and lower abdominal pain. Denies any vaginal bleeding. She has unknown LMP, states had Mirena IUD removed in May and has had irregular cycles since then. She has been sexually active without condoms. hCG in the ED was 64. TVUS showed no visible IUP and 2 ovarian cysts which were concerning for ovarian ectopic vs corpus luteum.     Objective:  Vitals:    24 1253   BP: 111/68   Pulse: 81   Resp: 16   Temp: 36.4 °C (97.5 °F)   SpO2: 95%     Physical Exam  General: Well appearing, alert  HEENT: normocephalic, EOMI, clear sclera  Cardio: Warm and well perfused  Resp: breathing comfortably on room air  Neuro: grossly intact, no focal deficits  Extremities: full ROM, no calf tenderness  Psych: A&O x3, appropriate mood and affect    TVUS 24  1. No intrauterine gestational sac is identified. In combination with  a positive hCG, this is consistent with pregnancy of unknown location.  2. There are two discrete thick walled cystic and peripherally  vascular structures within the right ovarian parenchyma, not  completely characterized, however right ovarian ectopic and corpus  luteum are not excluded. Alternatively, one or both of these  structures may represent hemorrhagic cysts. Correlation with serial  hCG and repeat ultrasound exam in 2-3 days or sooner based on  clinical picture is highly recommended.    Assessment/Plan:  Pregnancy of Unknown Location  - patient is hemodynamically stable, no acute distress  - added to Beta book  - hCG 64, will fu repeat hCG in 48 hours  - advised to return to ED if having heavy vaginal bleeding, severe abdominal pain, or fevers    Discussed with Dr. Salomon Angeles MD, PGY-1

## 2024-09-11 NOTE — ED PROVIDER NOTES
Emergency Department Provider Note        History of Present Illness     History provided by: Patient  Limitations to History: None  External Records Reviewed with Brief Summary: None    HPI:  Shefali Arango is a 29 y.o. female presenting today with with the chief complaint of a productive cough.  Patient also states she felt feverish last night and had 3 episodes of vomiting last night.  Patient denies hematemesis.  Patient also felt short of breath last night at around 1 AM.  Patient complains of a headache, rhinorrhea, intermittent chest pain, and a sore throat.  Patient states her chest pain primarily occurs when coughing.  Patient denies radiating pain to arm, jaw, or neck.  Patient's chest pain is consistent with history of coughing.  Patient has a past medical history of asthma.  Patient states multiple family members of hers have had similar symptoms.  Patient has been managing her symptoms with albuterol, Tylenol, and Zofran.  She last took Tylenol and Zofran at 2 AM last night.    Patient also states that she has had increased urinary frequency.  Patient denies pain or burning with urination.  Patient denies hematuria or abnormal discharge.  Patient has concerns of a UTI or pregnancy.  Patient's last menstrual period was August 14.  Patient denies ear pain or belly pain.    Physical Exam   Triage vitals:  T 36.4 °C (97.5 °F)  HR 81  /68  RR 16  O2 95 % None (Room air)    Physical Exam  Constitutional:       General: She is not in acute distress.     Appearance: Normal appearance. She is obese. She is not ill-appearing or toxic-appearing.   HENT:      Right Ear: Tympanic membrane and ear canal normal.      Left Ear: Tympanic membrane and ear canal normal.      Nose: Nose normal.      Mouth/Throat:      Pharynx: Oropharynx is clear. No oropharyngeal exudate or posterior oropharyngeal erythema.   Cardiovascular:      Rate and Rhythm: Normal rate and regular rhythm.      Heart sounds: Normal heart  sounds.   Pulmonary:      Effort: Pulmonary effort is normal. No respiratory distress.      Breath sounds: Normal breath sounds. No stridor or decreased air movement. No decreased breath sounds, wheezing, rhonchi or rales.   Chest:      Chest wall: No tenderness.   Abdominal:      Palpations: Abdomen is soft.      Tenderness: There is abdominal tenderness (suprapubic tenderness on palpation) in the suprapubic area. There is no right CVA tenderness or left CVA tenderness.   Musculoskeletal:         General: Normal range of motion.      Cervical back: No tenderness.   Lymphadenopathy:      Cervical: No cervical adenopathy.   Neurological:      General: No focal deficit present.      Mental Status: She is alert and oriented to person, place, and time.   Psychiatric:         Mood and Affect: Mood normal.         Behavior: Behavior normal.        US PELVIS TRANSABDOMINAL WITH TRANSVAGINAL   Preliminary Result   1. No intrauterine gestational sac is identified. In combination with   a positive hCG, this is consistent with pregnancy of unknown location.   2. There are two discrete thick walled cystic and peripherally   vascular structures within the right ovarian parenchyma, not   completely characterized, however right ovarian ectopic and corpus   luteum are not excluded. Alternatively, one or both of these   structures may represent hemorrhagic cysts. Correlation with serial   hCG and repeat ultrasound exam in 2-3 days or sooner based on   clinical picture is highly recommended.        I personally reviewed the image(s)/study and resident interpretation   as stated by Dr. Susy Loomis MD. I agree with the findings as   stated. This study was interpreted at [facility], [place], [place].        MACRO:   None             Dictation workstation:   [id number]      XR chest 2 views   Final Result   No acute pulmonary pathology.   Signed by Mark Acevedo MD        Labs Reviewed    HUMAN CHORIONIC GONADOTROPIN, SERUM QUANTITATIVE - Abnormal       Result Value    HCG, Beta-Quantitative 64 (*)     Narrative:     Total HCG measurement is performed using the Siemens [employer] immunoassay which detects intact HCG and free beta HCG subunit.  This test is not indicated for use as a tumor marker.  HCG testing is performed using a different test methodology at [facility] than other [facility]. Direct result comparison  should only be made within the same method.          CBC WITH AUTO DIFFERENTIAL - Abnormal    WBC 11.0      nRBC 0.0      RBC 4.83      Hemoglobin 13.4      Hematocrit 42.4      MCV 88      MCH 27.7      MCHC 31.6 (*)     RDW 13.5      Platelets 356      Neutrophils % 79.1      Immature Granulocytes %, Automated 1.5 (*)     Lymphocytes % 13.1      Monocytes % 4.1      Eosinophils % 1.9      Basophils % 0.3      Neutrophils Absolute 8.73 (*)     Immature Granulocytes Absolute, Automated 0.16      Lymphocytes Absolute 1.44      Monocytes Absolute 0.45      Eosinophils Absolute 0.21      Basophils Absolute 0.03     COMPREHENSIVE METABOLIC PANEL - Abnormal    Glucose 106 (*)     Sodium 136      Potassium 3.7      Chloride 103      Bicarbonate 21      Anion Gap 16      Urea Nitrogen 7      Creatinine 0.64      eGFR >90      Calcium 9.6      Albumin 4.0      Alkaline Phosphatase 67      Total Protein 8.7 (*)     AST 9      Bilirubin, Total 0.5      ALT 10     POCT PREGNANCY, URINE - Abnormal    Preg Test, Ur Positive (*)    GROUP A STREPTOCOCCUS, PCR - Normal    Group A Strep PCR Not Detected     SARS-COV-2 PCR - Normal    Coronavirus 2019, PCR Not Detected      Narrative:     This assay has received FDA Emergency Use Authorization (EUA) and is only authorized for the duration of time that circumstances exist to justify the authorization of the emergency use of in vitro diagnostic tests for the detection of SARS-CoV-2 virus and/or diagnosis of COVID-19 infection under  section 564(b)(1) of the Act, 21 U.S.C. 360bbb-3(b)(1). This assay is an in vitro diagnostic nucleic acid amplification test for the qualitative detection of SARS-CoV-2 from nasopharyngeal specimens and has been validated for use at [facility]. Negative results do not preclude COVID-19 infections and should not be used as the sole basis for diagnosis, treatment, or other management decisions.     URINALYSIS WITH REFLEX CULTURE AND MICROSCOPIC - Normal    Color, Urine Light-Yellow      Appearance, Urine Clear      Specific Gravity, Urine 1.016      pH, Urine 6.0      Protein, Urine NEGATIVE      Glucose, Urine Normal      Blood, Urine NEGATIVE      Ketones, Urine NEGATIVE      Bilirubin, Urine NEGATIVE      Urobilinogen, Urine Normal      Nitrite, Urine NEGATIVE      Leukocyte Esterase, Urine NEGATIVE     HIV 1/2 ANTIGEN/ANTIBODY SCREEN WIH REFLEX TO CONFIRMATION - Normal    HIV 1/2 Antigen/Antibody Screen with Reflex to Confirmation Nonreactive      Narrative:     HIV Ag/Ab screen is performed using the Siemens [employer] HIV Ag/Ab Combo assay which detects the presence of HIV p24 antigen as well as antibodies to HIV-1 (Group M and O) and HIV-2.  No laboratory evidence of HIV infection. If acute HIV infection is suspected, consider testing for HIV RNA by PCR (viral load).   HEPATITIS C ANTIBODY - Normal    Hepatitis C AB Nonreactive     TRICHOMONAS WET PREP REFLEX TO PCR    Trichomonas None Seen      Clue Cells None Seen      Yeast None Seen      WBC 1-2      Trichomonas reflex comment        Value: Trichomonas was not seen by wet prep. Reflex Trichomonas vaginalis by Amplified Detection.   TYPE AND SCREEN    ABO TYPE O      Rh TYPE NEG      ANTIBODY SCREEN NEG      Narrative:     Review your Rh Negative female patient's potential need for Rh Immune Globulin (RhIg)administration.   URINALYSIS WITH REFLEX CULTURE AND MICROSCOPIC    Narrative:     The following orders were created for panel order  Urinalysis with Reflex Culture and Microscopic.  Procedure                               Abnormality         Status                     ---------                               -----------         ------                     Urinalysis with Reflex C...[[id number]]  Normal              Final result               Extra Urine Gray Tube[[id number]]                            In process                   Please view results for these tests on the individual orders.   C. TRACHOMATIS + N. GONORRHOEAE, AMPLIFIED   EXTRA URINE GRAY TUBE   SYPHILIS SCREENING WITH REFLEX   TRICH VAGINALIS, AMPLIFIED     ED Course as of 09/11/24 1837   Wed Sep 11, 2024   1306 ECG 12 lead  NSR rate of 86, , QRS 74, QTc 416, normal axis, no ischemic changes. []   [id number] OBGYN paged [MK]   1719 Rh Type: NEG  Patient is not bleeding and will not require rhogam today's visit but will require it in the future []      ED Course User Index  [] Isela Berry PA-C         Diagnoses as of 24   Pregnancy of unknown anatomic location (HHS-HCC)   Rh negative status during pregnancy in first trimester (HHS-HCC)   Lower abdominal pain         Medical Decision Making & ED Course   Medical Decision Makin y.o. female who presents today complaining of productive cough, SOB, fever, vomiting, sore throat, and headache.  Patient has been coughing for 3 days.  Patient states last night she felt feverish, experienced shortness of breath, and vomited.  Patient also states she has had increased urinary frequency, but denies other urinary symptoms.  A chest x-ray was ordered to rule out pneumonia.  COVID and strep swabs were ordered.  A urinalysis and pregnancy test were ordered.  Pregnancy test came back positive, therefore, a transvaginal ultrasound, CBC, CMP, type and screen, and STD testing were ordered. Patient's pregnancy test surprisingly came back positive and for this reason quantitative hCG, type and screen, basic labs and pelvic  ultrasound were added.  Her CBC is unremarkable, CMP without electrolyte derangements, quantitative hCG is only 64 consistent with early pregnancy, she is O- blood type however is not currently bleeding and not requiring RhoGAM, hepatitis C HIV is negative, syphilis currently pending, urinalysis without evidence of infection, wet prep is negative, COVID strep and chest x-ray are negative.  Patient's ultrasound showed the following:    IMPRESSION:  1. No intrauterine gestational sac is identified. In combination with  a positive hCG, this is consistent with pregnancy of unknown location.  2. There are two discrete thick walled cystic and peripherally  vascular structures within the right ovarian parenchyma, not  completely characterized, however right ovarian ectopic and corpus  luteum are not excluded. Alternatively, one or both of these  structures may represent hemorrhagic cysts. Correlation with serial  hCG and repeat ultrasound exam in 2-3 days or sooner based on  clinical picture is highly recommended.    Very likely consistent with very early pregnancy however OB/GYN was consulted and saw the patient, they recommended return in 48 hours for repeat hCG and outpatient follow-up with OB/GYN, suspect just very early pregnancy..  Patient is aware that we have not definitively ruled out an ectopic pregnancy if she develops any increasing pain, bleeding, dizziness lightheadedness or overall worsening symptoms she is aware to return to the emergency room.  ----      Differential diagnoses considered include but are not limited to: Strep pharyngitis, COVID, pneumonia, URI, influenza, cystitis, pregnancy, pyelonephritis     Social Determinants of Health which Significantly Impact Care: None identified     EKG Independent Interpretation: EKG not obtained    Independent Result Review and Interpretation: Relevant laboratory and radiographic results were reviewed and independently interpreted by myself.  As necessary, they  are commented on in the ED Course.    Chronic conditions affecting the patient's care: As documented above in [facility]    The patient was discussed with the following consultants/services:  OBGYN    Care Considerations: As documented above in [facility]    ED Course:  ED Course as of 09/11/24 1835   Wed Sep 11, 2024   1306 ECG 12 lead  NSR rate of 86, , QRS 74, QTc 416, normal axis, no ischemic changes. []   0827 OBGYN paged [MK]   9617 Rh Type: NEG  Patient is not bleeding and will not require rhogam today's visit but will require it in the future []      ED Course User Index  [MK] Isela Berry PA-C         Diagnoses as of 09/11/24 1835   Pregnancy of unknown anatomic location (HHS-HCC)   Rh negative status during pregnancy in first trimester (HHS-HCC)   Lower abdominal pain     Disposition   As a result of the work-up, the patient was discharged home.  she was informed of her diagnosis and instructed to come back with any concerns or worsening of condition.  she and was agreeable to the plan as discussed above.  she was given the opportunity to ask questions.  All of the patient's questions were answered.    Procedures   Procedures    Patient was seen independently    Isela Berry PA-C  Emergency Medicine       Isela Berry PA-C  09/11/24 1837

## 2024-09-11 NOTE — DISCHARGE INSTRUCTIONS
Your pregnancy hormone level today is 64, this is really low and we do not expect to see anything on ultrasound however since we cannot definitively confirm an intrauterine pregnancy on ultrasound technically an ectopic pregnancy cannot be ruled out.  If you experience any bleeding, increasing pain especially one-sided pain, dizziness lightheadedness etc., return to the emergency room.  Return in 2 days to get your blood redrawn to make sure that your pregnancy hormone level is uptrending appropriately.

## 2024-09-12 LAB
HOLD SPECIMEN: NORMAL
T VAGINALIS RRNA SPEC QL NAA+PROBE: NEGATIVE

## 2024-09-13 ENCOUNTER — LAB (OUTPATIENT)
Dept: LAB | Facility: LAB | Age: 29
End: 2024-09-13
Payer: COMMERCIAL

## 2024-09-13 DIAGNOSIS — R10.9 ABDOMINAL PAIN, UNSPECIFIED ABDOMINAL LOCATION: ICD-10-CM

## 2024-09-13 DIAGNOSIS — O36.80X0 PREGNANCY OF UNKNOWN ANATOMIC LOCATION (HHS-HCC): ICD-10-CM

## 2024-09-13 LAB
B-HCG SERPL-ACNC: 179 MIU/ML
GLIADIN PEPTIDE IGA SER IA-ACNC: <1 U/ML
TTG IGA SER IA-ACNC: <1 U/ML

## 2024-09-13 PROCEDURE — 83516 IMMUNOASSAY NONANTIBODY: CPT

## 2024-09-13 PROCEDURE — 36415 COLL VENOUS BLD VENIPUNCTURE: CPT

## 2024-09-13 PROCEDURE — 84702 CHORIONIC GONADOTROPIN TEST: CPT

## 2024-09-15 DIAGNOSIS — O36.80X0 PREGNANCY OF UNKNOWN ANATOMIC LOCATION (HHS-HCC): Primary | ICD-10-CM

## 2024-09-16 LAB
GLIADIN PEPTIDE IGG SER IA-ACNC: <0.56 FLU (ref 0–4.99)
TTG IGG SER IA-ACNC: <0.82 FLU (ref 0–4.99)

## 2024-09-19 ENCOUNTER — APPOINTMENT (OUTPATIENT)
Dept: RADIOLOGY | Facility: HOSPITAL | Age: 29
End: 2024-09-19
Payer: COMMERCIAL

## 2024-09-20 ENCOUNTER — PHARMACY VISIT (OUTPATIENT)
Dept: PHARMACY | Facility: CLINIC | Age: 29
End: 2024-09-20
Payer: MEDICAID

## 2024-09-20 ENCOUNTER — OFFICE VISIT (OUTPATIENT)
Dept: OBSTETRICS AND GYNECOLOGY | Facility: CLINIC | Age: 29
End: 2024-09-20
Payer: COMMERCIAL

## 2024-09-20 ENCOUNTER — LAB (OUTPATIENT)
Dept: LAB | Facility: LAB | Age: 29
End: 2024-09-20
Payer: COMMERCIAL

## 2024-09-20 VITALS
WEIGHT: 284.31 LBS | BODY MASS INDEX: 50.36 KG/M2 | DIASTOLIC BLOOD PRESSURE: 82 MMHG | SYSTOLIC BLOOD PRESSURE: 130 MMHG | HEART RATE: 101 BPM

## 2024-09-20 DIAGNOSIS — R11.2 NAUSEA AND VOMITING, UNSPECIFIED VOMITING TYPE: ICD-10-CM

## 2024-09-20 DIAGNOSIS — O36.80X0 PREGNANCY OF UNKNOWN ANATOMIC LOCATION (HHS-HCC): ICD-10-CM

## 2024-09-20 DIAGNOSIS — Z32.01 PREGNANCY TEST POSITIVE (HHS-HCC): Primary | ICD-10-CM

## 2024-09-20 LAB — B-HCG SERPL-ACNC: 2559 MIU/ML

## 2024-09-20 PROCEDURE — 99212 OFFICE O/P EST SF 10 MIN: CPT | Mod: GC

## 2024-09-20 PROCEDURE — 36415 COLL VENOUS BLD VENIPUNCTURE: CPT

## 2024-09-20 PROCEDURE — 84702 CHORIONIC GONADOTROPIN TEST: CPT

## 2024-09-20 PROCEDURE — 3079F DIAST BP 80-89 MM HG: CPT

## 2024-09-20 PROCEDURE — 99212 OFFICE O/P EST SF 10 MIN: CPT

## 2024-09-20 PROCEDURE — 3075F SYST BP GE 130 - 139MM HG: CPT

## 2024-09-20 PROCEDURE — 1036F TOBACCO NON-USER: CPT

## 2024-09-20 PROCEDURE — RXMED WILLOW AMBULATORY MEDICATION CHARGE

## 2024-09-20 RX ORDER — PRENATAL VIT NO.126/IRON/FOLIC 28MG-0.8MG
TABLET ORAL
COMMUNITY
Start: 2024-09-11

## 2024-09-20 RX ORDER — ONDANSETRON 4 MG/1
4 TABLET, ORALLY DISINTEGRATING ORAL EVERY 8 HOURS PRN
Qty: 15 TABLET | Refills: 0 | Status: SHIPPED | OUTPATIENT
Start: 2024-09-20

## 2024-09-20 ASSESSMENT — PAIN SCALES - GENERAL: PAINLEVEL: 0-NO PAIN

## 2024-09-20 NOTE — PROGRESS NOTES
I saw and evaluated the patient. I personally obtained the key and critical portions of the history and physical exam or was physically present for key and critical portions performed by the resident/fellow. I reviewed the resident/fellow's documentation and discussed the patient with the resident/fellow. I agree with the resident/fellow's medical decision making as documented in the note.    Vicki Doran MD

## 2024-09-20 NOTE — PROGRESS NOTES
Subjective   Patient ID: Shefali Arango is a 29 y.o. female who presents for No chief complaint on file..  HPI  Patient reports some mild cramping yesterday but denies any vaginal bleeding. States this is a desired pregnancy.    Review of Systems   All other systems reviewed and are negative.      Objective   Physical Exam  Constitutional:       Appearance: Normal appearance.   Pulmonary:      Effort: Pulmonary effort is normal.   Musculoskeletal:         General: Normal range of motion.   Neurological:      General: No focal deficit present.      Mental Status: She is alert.   Psychiatric:         Mood and Affect: Mood normal.         Assessment/Plan   28yo  presenting for repeat quant hcg.    Pregnancy of unknown location  - Hcg 64 () > 179 ()  - Repeat quant hcg today  - Schedule US depending on quant           Seen and discussed w/ Dr. Espinoza Green MD PGY-2  Obstetrics & Gynecology

## 2024-09-22 DIAGNOSIS — O36.80X0 PREGNANCY OF UNKNOWN ANATOMIC LOCATION (HHS-HCC): Primary | ICD-10-CM

## 2024-09-30 ENCOUNTER — TELEPHONE (OUTPATIENT)
Dept: OBSTETRICS AND GYNECOLOGY | Facility: CLINIC | Age: 29
End: 2024-09-30

## 2024-09-30 ENCOUNTER — HOSPITAL ENCOUNTER (OUTPATIENT)
Dept: RADIOLOGY | Facility: CLINIC | Age: 29
Discharge: HOME | End: 2024-09-30
Payer: COMMERCIAL

## 2024-09-30 DIAGNOSIS — O36.80X0 PREGNANCY OF UNKNOWN ANATOMIC LOCATION (HHS-HCC): Primary | ICD-10-CM

## 2024-09-30 DIAGNOSIS — O99.211 OBESITY AFFECTING PREGNANCY IN FIRST TRIMESTER (HHS-HCC): ICD-10-CM

## 2024-09-30 DIAGNOSIS — O36.80X0 PREGNANCY OF UNKNOWN ANATOMIC LOCATION (HHS-HCC): ICD-10-CM

## 2024-09-30 PROCEDURE — 76817 TRANSVAGINAL US OBSTETRIC: CPT | Performed by: OBSTETRICS & GYNECOLOGY

## 2024-09-30 PROCEDURE — 76801 OB US < 14 WKS SINGLE FETUS: CPT

## 2024-09-30 PROCEDURE — 76817 TRANSVAGINAL US OBSTETRIC: CPT

## 2024-09-30 PROCEDURE — 76801 OB US < 14 WKS SINGLE FETUS: CPT | Performed by: OBSTETRICS & GYNECOLOGY

## 2024-10-03 ENCOUNTER — APPOINTMENT (OUTPATIENT)
Dept: RADIOLOGY | Facility: HOSPITAL | Age: 29
End: 2024-10-03
Payer: COMMERCIAL

## 2024-10-03 ENCOUNTER — HOSPITAL ENCOUNTER (EMERGENCY)
Facility: HOSPITAL | Age: 29
Discharge: HOME | End: 2024-10-03
Attending: EMERGENCY MEDICINE
Payer: COMMERCIAL

## 2024-10-03 VITALS
SYSTOLIC BLOOD PRESSURE: 116 MMHG | RESPIRATION RATE: 18 BRPM | HEIGHT: 63 IN | WEIGHT: 284 LBS | TEMPERATURE: 96.3 F | BODY MASS INDEX: 50.32 KG/M2 | DIASTOLIC BLOOD PRESSURE: 73 MMHG | HEART RATE: 84 BPM | OXYGEN SATURATION: 100 %

## 2024-10-03 DIAGNOSIS — R10.84 GENERALIZED ABDOMINAL PAIN: Primary | ICD-10-CM

## 2024-10-03 LAB
ALBUMIN SERPL BCP-MCNC: 3.9 G/DL (ref 3.4–5)
ALP SERPL-CCNC: 66 U/L (ref 33–110)
ALT SERPL W P-5'-P-CCNC: 8 U/L (ref 7–45)
ANION GAP SERPL CALC-SCNC: 13 MMOL/L (ref 10–20)
APPEARANCE UR: CLEAR
AST SERPL W P-5'-P-CCNC: 13 U/L (ref 9–39)
B-HCG SERPL-ACNC: ABNORMAL MIU/ML
BACTERIA #/AREA URNS AUTO: ABNORMAL /HPF
BASOPHILS # BLD AUTO: 0.02 X10*3/UL (ref 0–0.1)
BASOPHILS NFR BLD AUTO: 0.2 %
BILIRUB SERPL-MCNC: 0.6 MG/DL (ref 0–1.2)
BILIRUB UR STRIP.AUTO-MCNC: NEGATIVE MG/DL
BUN SERPL-MCNC: 4 MG/DL (ref 6–23)
CALCIUM SERPL-MCNC: 9.5 MG/DL (ref 8.6–10.6)
CHLORIDE SERPL-SCNC: 102 MMOL/L (ref 98–107)
CLUE CELLS SPEC QL WET PREP: NORMAL
CO2 SERPL-SCNC: 23 MMOL/L (ref 21–32)
COLOR UR: YELLOW
CREAT SERPL-MCNC: 0.57 MG/DL (ref 0.5–1.05)
EGFRCR SERPLBLD CKD-EPI 2021: >90 ML/MIN/1.73M*2
EOSINOPHIL # BLD AUTO: 0.24 X10*3/UL (ref 0–0.7)
EOSINOPHIL NFR BLD AUTO: 2.5 %
ERYTHROCYTE [DISTWIDTH] IN BLOOD BY AUTOMATED COUNT: 13.4 % (ref 11.5–14.5)
GLUCOSE SERPL-MCNC: 84 MG/DL (ref 74–99)
GLUCOSE UR STRIP.AUTO-MCNC: NORMAL MG/DL
HCT VFR BLD AUTO: 41.9 % (ref 36–46)
HGB BLD-MCNC: 13.2 G/DL (ref 12–16)
HOLD SPECIMEN: NORMAL
IMM GRANULOCYTES # BLD AUTO: 0.03 X10*3/UL (ref 0–0.7)
IMM GRANULOCYTES NFR BLD AUTO: 0.3 % (ref 0–0.9)
KETONES UR STRIP.AUTO-MCNC: NEGATIVE MG/DL
LEUKOCYTE ESTERASE UR QL STRIP.AUTO: ABNORMAL
LYMPHOCYTES # BLD AUTO: 2.16 X10*3/UL (ref 1.2–4.8)
LYMPHOCYTES NFR BLD AUTO: 22.9 %
MAGNESIUM SERPL-MCNC: 2.02 MG/DL (ref 1.6–2.4)
MCH RBC QN AUTO: 28.1 PG (ref 26–34)
MCHC RBC AUTO-ENTMCNC: 31.5 G/DL (ref 32–36)
MCV RBC AUTO: 89 FL (ref 80–100)
MONOCYTES # BLD AUTO: 0.52 X10*3/UL (ref 0.1–1)
MONOCYTES NFR BLD AUTO: 5.5 %
MUCOUS THREADS #/AREA URNS AUTO: ABNORMAL /LPF
NEUTROPHILS # BLD AUTO: 6.47 X10*3/UL (ref 1.2–7.7)
NEUTROPHILS NFR BLD AUTO: 68.6 %
NITRITE UR QL STRIP.AUTO: NEGATIVE
NRBC BLD-RTO: 0 /100 WBCS (ref 0–0)
PH UR STRIP.AUTO: 6 [PH]
PLATELET # BLD AUTO: 328 X10*3/UL (ref 150–450)
POTASSIUM SERPL-SCNC: 4.1 MMOL/L (ref 3.5–5.3)
PROT SERPL-MCNC: 7.7 G/DL (ref 6.4–8.2)
PROT UR STRIP.AUTO-MCNC: ABNORMAL MG/DL
RBC # BLD AUTO: 4.69 X10*6/UL (ref 4–5.2)
RBC # UR STRIP.AUTO: NEGATIVE /UL
RBC #/AREA URNS AUTO: ABNORMAL /HPF
SODIUM SERPL-SCNC: 134 MMOL/L (ref 136–145)
SP GR UR STRIP.AUTO: 1.02
SQUAMOUS #/AREA URNS AUTO: ABNORMAL /HPF
T VAGINALIS SPEC QL WET PREP: NORMAL
TRICHOMONAS REFLEX COMMENT: NORMAL
UROBILINOGEN UR STRIP.AUTO-MCNC: NORMAL MG/DL
WBC # BLD AUTO: 9.4 X10*3/UL (ref 4.4–11.3)
WBC #/AREA URNS AUTO: ABNORMAL /HPF
WBC VAG QL WET PREP: NORMAL
YEAST VAG QL WET PREP: NORMAL

## 2024-10-03 PROCEDURE — 99284 EMERGENCY DEPT VISIT MOD MDM: CPT | Performed by: EMERGENCY MEDICINE

## 2024-10-03 PROCEDURE — 87491 CHLMYD TRACH DNA AMP PROBE: CPT

## 2024-10-03 PROCEDURE — 99284 EMERGENCY DEPT VISIT MOD MDM: CPT | Mod: 25

## 2024-10-03 PROCEDURE — 87661 TRICHOMONAS VAGINALIS AMPLIF: CPT

## 2024-10-03 PROCEDURE — 2500000004 HC RX 250 GENERAL PHARMACY W/ HCPCS (ALT 636 FOR OP/ED): Mod: SE

## 2024-10-03 PROCEDURE — 81001 URINALYSIS AUTO W/SCOPE: CPT

## 2024-10-03 PROCEDURE — 2500000005 HC RX 250 GENERAL PHARMACY W/O HCPCS: Mod: SE

## 2024-10-03 PROCEDURE — 84702 CHORIONIC GONADOTROPIN TEST: CPT

## 2024-10-03 PROCEDURE — 87210 SMEAR WET MOUNT SALINE/INK: CPT

## 2024-10-03 PROCEDURE — 86780 TREPONEMA PALLIDUM: CPT

## 2024-10-03 PROCEDURE — 80053 COMPREHEN METABOLIC PANEL: CPT

## 2024-10-03 PROCEDURE — 83735 ASSAY OF MAGNESIUM: CPT

## 2024-10-03 PROCEDURE — 76817 TRANSVAGINAL US OBSTETRIC: CPT | Performed by: RADIOLOGY

## 2024-10-03 PROCEDURE — 36415 COLL VENOUS BLD VENIPUNCTURE: CPT

## 2024-10-03 PROCEDURE — 85025 COMPLETE CBC W/AUTO DIFF WBC: CPT

## 2024-10-03 PROCEDURE — 76830 TRANSVAGINAL US NON-OB: CPT

## 2024-10-03 PROCEDURE — 76815 OB US LIMITED FETUS(S): CPT | Performed by: RADIOLOGY

## 2024-10-03 PROCEDURE — 87086 URINE CULTURE/COLONY COUNT: CPT

## 2024-10-03 PROCEDURE — 2500000001 HC RX 250 WO HCPCS SELF ADMINISTERED DRUGS (ALT 637 FOR MEDICARE OP): Mod: SE

## 2024-10-03 RX ORDER — ACETAMINOPHEN 325 MG/1
975 TABLET ORAL ONCE
Status: COMPLETED | OUTPATIENT
Start: 2024-10-03 | End: 2024-10-03

## 2024-10-03 RX ORDER — ONDANSETRON 4 MG/1
4 TABLET, ORALLY DISINTEGRATING ORAL EVERY 8 HOURS PRN
Qty: 15 TABLET | Refills: 0 | Status: SHIPPED | OUTPATIENT
Start: 2024-10-03

## 2024-10-03 RX ORDER — CEPHALEXIN 250 MG/1
500 CAPSULE ORAL ONCE
Status: COMPLETED | OUTPATIENT
Start: 2024-10-03 | End: 2024-10-03

## 2024-10-03 RX ORDER — ONDANSETRON HYDROCHLORIDE 2 MG/ML
4 INJECTION, SOLUTION INTRAVENOUS ONCE
Status: DISCONTINUED | OUTPATIENT
Start: 2024-10-03 | End: 2024-10-03

## 2024-10-03 RX ORDER — CEPHALEXIN 500 MG/1
500 CAPSULE ORAL 4 TIMES DAILY
Qty: 28 CAPSULE | Refills: 0 | Status: SHIPPED | OUTPATIENT
Start: 2024-10-03 | End: 2024-10-10

## 2024-10-03 RX ORDER — CEPHALEXIN 500 MG/1
500 CAPSULE ORAL 4 TIMES DAILY
Qty: 40 CAPSULE | Refills: 0 | Status: SHIPPED | OUTPATIENT
Start: 2024-10-03 | End: 2024-10-03

## 2024-10-03 RX ORDER — ONDANSETRON 4 MG/1
4 TABLET, ORALLY DISINTEGRATING ORAL ONCE
Status: COMPLETED | OUTPATIENT
Start: 2024-10-03 | End: 2024-10-03

## 2024-10-03 ASSESSMENT — PAIN SCALES - GENERAL
PAINLEVEL_OUTOF10: 0 - NO PAIN
PAINLEVEL_OUTOF10: 4

## 2024-10-03 ASSESSMENT — PAIN - FUNCTIONAL ASSESSMENT: PAIN_FUNCTIONAL_ASSESSMENT: 0-10

## 2024-10-03 NOTE — ED PROVIDER NOTES
EMERGENCY DEPARTMENT ENCOUNTER      Pt Name: Shefali Arango  MRN: [id number]  Birthdate 1995  Date of evaluation: 10/3/2024  Provider: James Moore MD    CHIEF COMPLAINT       Chief Complaint   Patient presents with    Abdominal Pain    Flu Symptoms     HISTORY OF PRESENT ILLNESS    HPI  29-year-old female presents emergency department with chief complaint of abdominal pain and flulike symptoms.  Patient claims that since September 11 she has had with peers to be a cold with a runny nose cough on and off with chills.  Since that time she also discovered she was pregnant confirmed by ultrasound on Monday to Tuesday following her ultrasound she indicates that she wiped and had some spotting that was bloody she also had on Wednesday which is concerning.  She also claims that her vaginal discharge is a different consistency which is alarming she also endorses having nausea vomiting.  Nursing Notes were reviewed.    PAST MEDICAL HISTORY     Past Medical History:   Diagnosis Date    Asthma ([facility])     Bronchitis     DVT (deep vein thrombosis) in pregnancy ([facility])     Essential (primary) hypertension 10/31/2014    Elevated BP    History of IBS     Hx of chlamydia infection     Hx of pulmonary embolus     Morbid (severe) obesity due to excess calories (Multi) 12/16/2019    Morbid obesity with BMI of 50.0-59.9, adult         SURGICAL HISTORY       Past Surgical History:   Procedure Laterality Date    CHOLECYSTECTOMY      GALLBLADDER SURGERY  03/15/2018    Gallbladder Surgery         CURRENT MEDICATIONS       Previous Medications    ACETAMINOPHEN (TYLENOL) 325 MG TABLET    Take 2 tablets (650 mg) by mouth every 6 hours if needed for mild pain (1 - 3) or fever (temp greater than 38.0 C).    ALBUTEROL 90 MCG/ACTUATION AEROSOL POWDR BREATH ACTIVATED INHALER    Inhale 2 puffs every 6 hours if needed for wheezing.    CLASSIC PRENATAL 28 MG IRON- 800 MCG TABLET TABLET        ONDANSETRON ODT (ZOFRAN-ODT) 4 MG  DISINTEGRATING TABLET    Take 1 tablet (4 mg) by mouth every 8 hours if needed for nausea or vomiting.    PRENATAL VIT 14-IRON FUM-FOLIC 29 MG IRON- 1 MG TABLET,CHEWABLE    Chew 1 Units once daily.       ALLERGIES     Ketorolac, Prochlorperazine, and Tramadol    FAMILY HISTORY       Family History   Problem Relation Name Age of Onset    Heart failure Mother      Coronary artery disease Mother      Hypertension Mother      Deep vein thrombosis Mother      Seizures Mother      COPD Mother      Thyroid disease Mother      Colon cancer Maternal Grandmother      Heart failure Maternal Grandfather      Hypertension Maternal Grandfather      COPD Maternal Grandfather      Diabetes Paternal Grandmother      Breast cancer Other            SOCIAL HISTORY       Social History     Socioeconomic History    Marital status: Single   Tobacco Use    Smoking status: Never    Smokeless tobacco: Never   Vaping Use    Vaping status: Never Used   Substance and Sexual Activity    Alcohol use: Not Currently     Alcohol/week: 1.0 - 2.0 standard drink of alcohol     Types: 1 - 2 Glasses of wine per week    Drug use: Not Currently     Types: Marijuana    Sexual activity: Yes     Birth control/protection: OCP     Social Determinants of Health     Financial Resource Strain: Patient Declined (6/17/2024)    Overall Financial Resource Strain (CARDIA)     Difficulty of Paying Living Expenses: Patient declined   Food Insecurity: No Food Insecurity (7/31/2024)    Hunger Vital Sign     Worried About Running Out of Food in the Last Year: Never true     Ran Out of Food in the Last Year: Never true   Transportation Needs: Patient Declined (6/17/2024)    PRAPARE - Transportation     Lack of Transportation (Medical): Patient declined     Lack of Transportation (Non-Medical): Patient declined   Housing Stability: Patient Declined (6/17/2024)    Housing Stability Vital Sign     Unable to Pay for Housing in the Last Year: Patient declined     Number of Places  Lived in the Last Year: 1     Unstable Housing in the Last Year: Patient declined       SCREENINGS                        PHYSICAL EXAM    (up to 7 for level 4, 8 or more for level 5)     ED Triage Vitals [10/03/24 1526]   Temperature Heart Rate Respirations BP   36.3 °C (97.4 °F) (!) 102 17 121/76      Pulse Ox Temp Source Heart Rate Source Patient Position   99 % Temporal Monitor --      BP Location FiO2 (%)     -- --       Physical Exam  Exam conducted with a chaperone present.   Constitutional:       Appearance: She is well-developed.   HENT:      Head: Normocephalic and atraumatic.      Right Ear: Tympanic membrane normal.      Left Ear: Tympanic membrane normal.      Nose: Nose normal.      Mouth/Throat:      Mouth: Mucous membranes are moist.      Pharynx: Oropharynx is clear.   Eyes:      Extraocular Movements: Extraocular movements intact.      Pupils: Pupils are equal, round, and reactive to light.   Cardiovascular:      Rate and Rhythm: Normal rate and regular rhythm.      Pulses: Normal pulses.      Heart sounds: Normal heart sounds.   Pulmonary:      Effort: Pulmonary effort is normal.      Breath sounds: Normal breath sounds.   Abdominal:      General: Abdomen is flat.      Palpations: Abdomen is soft.      Comments: Patient's abdomen palpation in all fields with soft nontender patient complained of no pain in the abdomen she does endorse nausea however.  Will be treating with Zofran.   Genitourinary:     General: Normal vulva.      Exam position: Knee-chest position.      Vagina: Vaginal discharge present.      Cervix: Discharge present.            Comments: Mild vaginal discharge does not appear thick or purulent is thin and white.  Cervical os is closed there is a small what appears to be a scab on the left portion of the cervix on evaluation.  It was not actively bleeding there was no active or stagnant blood in the vaginal canal the cervix was normal color did not appear  inflamed.  Musculoskeletal:         General: Normal range of motion.      Cervical back: Normal range of motion and neck supple.   Skin:     General: Skin is warm.   Neurological:      General: No focal deficit present.      Mental Status: She is alert and oriented to person, place, and time.   Psychiatric:         Mood and Affect: Mood normal.         Behavior: Behavior normal.          DIAGNOSTIC RESULTS     LABS:  Labs Reviewed   GRAM STAIN FOR BACTERIAL VAGINOSIS/YEAST   URINALYSIS WITH REFLEX CULTURE AND MICROSCOPIC    Narrative:     The following orders were created for panel order Urinalysis with Reflex Culture and Microscopic.  Procedure                               Abnormality         Status                     ---------                               -----------         ------                     Urinalysis with Reflex C...[[id number]]                                                 Extra Urine Gray Tube[[id number]]                                                         Please view results for these tests on the individual orders.   CBC WITH AUTO DIFFERENTIAL   COMPREHENSIVE METABOLIC PANEL   MAGNESIUM   HUMAN CHORIONIC GONADOTROPIN, SERUM QUANTITATIVE   WET PREP, GENITAL   TRICHOMONAS WET PREP REFLEX TO PCR   C. TRACHOMATIS + N. GONORRHOEAE, AMPLIFIED   SYPHILIS SCREENING WITH REFLEX   URINALYSIS WITH REFLEX CULTURE AND MICROSCOPIC   EXTRA URINE GRAY TUBE       All other labs were within normal range or not returned as of this dictation.    Imaging  US OB transvaginal GYN US    (Results Pending)        Procedures  Procedures     EMERGENCY DEPARTMENT COURSE/MDM:   Medical Decision Making  29-year-old female presents emergency department chief complaint of nausea and abdominal pain.  Medical management treatment emergency department will consist of a pelvic exam followed by a transvaginal ultrasound to evaluate for any subchorionic hemorrhage or source of bleeding.  Also do basic labs which there is no  infectious etiology occurring currently.  Per the patient's request she asked for STD testing will be conducting those during the vaginal exam.  Patient's bedside pelvic exam showed minor discharge, active bleeding with closed cervical os.  Patient's workup is positive for a 1+ bacteria leukocyte esterase positive urine.  Will be treated the patient with a 7-day course of Keflex.  CBC CMP appeared appropriate the patient has been informed of her results and will be discharged home pending finalization of some results.  The patient has been aware she will check her MyChart.  The wet read of the transvaginal ultrasound shows Approximate 7-week live intrauterine gestation.  The patient will be discharged home with Keflex, Zofran.  The patient was also concerned about possible acid reflux we have reached out to pharmacy they indicate that the patient should take Tums as the safest modality we have informed the patient and told her she needs to follow-up with her OB/GYN within the week.  Patient will be discharged home strict return precautions.    Diagnoses as of 10/03/24 2108   Generalized abdominal pain        Patient and or family in agreement and understanding of treatment plan.  All questions answered.      I reviewed the case with the attending ED physician. The attending ED physician agrees with the plan. Patient and/or patient´s representative was counseled regarding labs, imaging, likely diagnosis, and plan. All questions were answered.    ED Medications administered this visit:    Medications   ondansetron (Zofran) injection 4 mg (has no administration in time range)   acetaminophen (Tylenol) tablet 975 mg (has no administration in time range)       New Prescriptions from this visit:    New Prescriptions    No medications on file       Follow-up:  No follow-up provider specified.      Final Impression: No diagnosis found.      (Please note that portions of this note were completed with a voice recognition  program.  Efforts were made to edit the dictations but occasionally words are mis-transcribed.)     James Moore MD  Resident  10/03/24 2116

## 2024-10-03 NOTE — ED TRIAGE NOTES
Pt presents with flu like symptoms, also has ABD pain with positive preg test, recent labs and ultrasound

## 2024-10-04 LAB
C TRACH RRNA SPEC QL NAA+PROBE: NEGATIVE
HOLD SPECIMEN: NORMAL
N GONORRHOEA DNA SPEC QL PROBE+SIG AMP: NEGATIVE
T VAGINALIS RRNA SPEC QL NAA+PROBE: NEGATIVE

## 2024-10-04 NOTE — DISCHARGE INSTRUCTIONS
You were seen emergency department today for evaluation of abdominal pain.  Is found that you have a small UTI we have sent antibiotics to your local pharmacy please pick them up and take them as indicated please follow-up with OB/GYN within the week if you develop any new discharge nausea vomiting or other concerning symptoms please return back to emergency department soon as possible.

## 2024-10-05 LAB — BACTERIA UR CULT: ABNORMAL

## 2024-10-07 ENCOUNTER — TELEPHONE (OUTPATIENT)
Dept: PHARMACY | Facility: HOSPITAL | Age: 29
End: 2024-10-07
Payer: COMMERCIAL

## 2024-10-07 LAB — TREPONEMA PALLIDUM IGG+IGM AB [PRESENCE] IN SERUM OR PLASMA BY IMMUNOASSAY: NONREACTIVE

## 2024-10-07 NOTE — PROGRESS NOTES
EDPD Note: Rapid Result Review    Reviewed Mr./Mrs./Ms. Shefali Arango 's chart regarding a positive inconclusive urine culture/result that was taken during their recent emergency room visit. The patient was not told about these results prior to leaving the emergency department. Therefore, patient was contacted and given proper education Patient denies urinary complaints. States she can not take medication QID due to her schedule. Told patient Keflex is giving QID for asymptomatic UTI in pregnancy. Patient is pregnant. Patient does not feel comfortable taking medication QID.  Told patient to at least take BID and follow up with OBGYN.     No further follow up needed from EDPD Team.     Pratima Turpin, PharmD

## 2024-10-09 ENCOUNTER — APPOINTMENT (OUTPATIENT)
Dept: RADIOLOGY | Facility: HOSPITAL | Age: 29
End: 2024-10-09
Payer: COMMERCIAL

## 2024-10-09 ENCOUNTER — PHARMACY VISIT (OUTPATIENT)
Dept: PHARMACY | Facility: CLINIC | Age: 29
End: 2024-10-09
Payer: MEDICAID

## 2024-10-09 ENCOUNTER — HOSPITAL ENCOUNTER (EMERGENCY)
Facility: HOSPITAL | Age: 29
Discharge: HOME | End: 2024-10-09
Payer: COMMERCIAL

## 2024-10-09 VITALS
TEMPERATURE: 97.8 F | OXYGEN SATURATION: 98 % | DIASTOLIC BLOOD PRESSURE: 85 MMHG | HEART RATE: 84 BPM | HEIGHT: 60 IN | SYSTOLIC BLOOD PRESSURE: 150 MMHG | RESPIRATION RATE: 17 BRPM | WEIGHT: 284 LBS | BODY MASS INDEX: 55.76 KG/M2

## 2024-10-09 DIAGNOSIS — B34.9 VIRAL SYNDROME: Primary | ICD-10-CM

## 2024-10-09 LAB
FLUAV RNA RESP QL NAA+PROBE: NOT DETECTED
FLUBV RNA RESP QL NAA+PROBE: NOT DETECTED
SARS-COV-2 RNA RESP QL NAA+PROBE: NOT DETECTED

## 2024-10-09 PROCEDURE — 2500000002 HC RX 250 W HCPCS SELF ADMINISTERED DRUGS (ALT 637 FOR MEDICARE OP, ALT 636 FOR OP/ED): Mod: SE

## 2024-10-09 PROCEDURE — 87636 SARSCOV2 & INF A&B AMP PRB: CPT

## 2024-10-09 PROCEDURE — 99283 EMERGENCY DEPT VISIT LOW MDM: CPT

## 2024-10-09 PROCEDURE — 71046 X-RAY EXAM CHEST 2 VIEWS: CPT

## 2024-10-09 PROCEDURE — RXMED WILLOW AMBULATORY MEDICATION CHARGE

## 2024-10-09 PROCEDURE — 71046 X-RAY EXAM CHEST 2 VIEWS: CPT | Performed by: STUDENT IN AN ORGANIZED HEALTH CARE EDUCATION/TRAINING PROGRAM

## 2024-10-09 PROCEDURE — 2500000001 HC RX 250 WO HCPCS SELF ADMINISTERED DRUGS (ALT 637 FOR MEDICARE OP): Mod: SE

## 2024-10-09 RX ORDER — FLUTICASONE PROPIONATE 50 MCG
1 SPRAY, SUSPENSION (ML) NASAL DAILY
Qty: 16 G | Refills: 0 | Status: SHIPPED | OUTPATIENT
Start: 2024-10-09 | End: 2024-11-08

## 2024-10-09 RX ORDER — FLUTICASONE PROPIONATE 50 MCG
2 SPRAY, SUSPENSION (ML) NASAL ONCE
Status: COMPLETED | OUTPATIENT
Start: 2024-10-09 | End: 2024-10-09

## 2024-10-09 RX ORDER — ACETAMINOPHEN 325 MG/1
650 TABLET ORAL ONCE
Status: COMPLETED | OUTPATIENT
Start: 2024-10-09 | End: 2024-10-09

## 2024-10-09 RX ORDER — ACETAMINOPHEN 500 MG
1000 TABLET ORAL EVERY 6 HOURS PRN
Qty: 30 TABLET | Refills: 0 | Status: SHIPPED | OUTPATIENT
Start: 2024-10-09 | End: 2024-10-19

## 2024-10-09 ASSESSMENT — PAIN DESCRIPTION - LOCATION: LOCATION: THROAT

## 2024-10-09 ASSESSMENT — LIFESTYLE VARIABLES
EVER FELT BAD OR GUILTY ABOUT YOUR DRINKING: NO
EVER HAD A DRINK FIRST THING IN THE MORNING TO STEADY YOUR NERVES TO GET RID OF A HANGOVER: NO
TOTAL SCORE: 0
HAVE YOU EVER FELT YOU SHOULD CUT DOWN ON YOUR DRINKING: NO
HAVE PEOPLE ANNOYED YOU BY CRITICIZING YOUR DRINKING: NO

## 2024-10-09 ASSESSMENT — PAIN - FUNCTIONAL ASSESSMENT: PAIN_FUNCTIONAL_ASSESSMENT: 0-10

## 2024-10-09 ASSESSMENT — PAIN SCALES - GENERAL: PAINLEVEL_OUTOF10: 7

## 2024-10-09 NOTE — ED TRIAGE NOTES
Pt to ED with c/o flu-like symptoms x a couple of days. Pt was seen here a couple of days ago for same complaints. States symptoms are still there. PMH Asthma/bronchitis.

## 2024-10-09 NOTE — DISCHARGE INSTRUCTIONS
Your COVID and flu swabs were negative and your chest x-ray did not show any acute processes.  It is likely you have some other viral syndrome at this time.  Your symptoms could also be related to your pregnancy.  Please be sure to rest and stay well-hydrated.  I wrote you prescriptions for Tylenol and Flonase.  Tylenol is the only medication safe to take for pain in pregnancy.  You can take it every 6 hours as needed.  Flonase is used for congestion and you can administer 2 sprays into each nostril once daily.  Otherwise, please follow-up with your OB/GYN as indicated for further management of your pregnancy.

## 2024-10-09 NOTE — ED PROVIDER NOTES
HPI   Chief Complaint   Patient presents with    Flu Symptoms       Patient is a 29-year-old female with a past medical history significant for asthma presenting to the ED with flulike symptoms.  Patient states she is currently 2 months pregnant and has had a confirmed IUP.  She states she has been experiencing flulike symptoms since early September.  She states she has been seen and evaluated for these symptoms over the course of those 2 months, but a diagnosis was never made.  Patient endorses both a dry and productive cough, headache, chills, shortness of breath which is worse at night, and head pressure.  Patient says she is currently on Keflex for a UTI and has a few more days worth of treatment.  She otherwise denies any fevers, chills, chest pain, abdominal pain, nausea/vomiting, or abnormal vaginal bleeding.              Patient History   Past Medical History:   Diagnosis Date    Asthma ([facility])     Bronchitis     DVT (deep vein thrombosis) in pregnancy ([facility])     Essential (primary) hypertension 10/31/2014    Elevated BP    History of IBS     Hx of chlamydia infection     Hx of pulmonary embolus     Morbid (severe) obesity due to excess calories (Multi) 12/16/2019    Morbid obesity with BMI of 50.0-59.9, adult     Past Surgical History:   Procedure Laterality Date    CHOLECYSTECTOMY      GALLBLADDER SURGERY  03/15/2018    Gallbladder Surgery     Family History   Problem Relation Name Age of Onset    Heart failure Mother      Coronary artery disease Mother      Hypertension Mother      Deep vein thrombosis Mother      Seizures Mother      COPD Mother      Thyroid disease Mother      Colon cancer Maternal Grandmother      Heart failure Maternal Grandfather      Hypertension Maternal Grandfather      COPD Maternal Grandfather      Diabetes Paternal Grandmother      Breast cancer Other       Social History     Tobacco Use    Smoking status: Never    Smokeless tobacco: Never   Vaping Use    Vaping status:  Never Used   Substance Use Topics    Alcohol use: Not Currently     Alcohol/week: 1.0 - 2.0 standard drink of alcohol     Types: 1 - 2 Glasses of wine per week    Drug use: Not Currently     Types: Marijuana       Physical Exam   ED Triage Vitals [10/09/24 1130]   Temperature Heart Rate Respirations BP   36.6 °C (97.8 °F) 84 17 150/85      Pulse Ox Temp Source Heart Rate Source Patient Position   98 % Temporal Monitor Sitting      BP Location FiO2 (%)     Left arm --       Physical Exam  Vitals reviewed.   Constitutional:       General: She is not in acute distress.     Appearance: She is not ill-appearing.   HENT:      Ears:      Comments: No tragal or mastoid tenderness bilaterally.  No erythema or edema.  No discharge or drainage.     Nose: Congestion present. No rhinorrhea.      Mouth/Throat:      Mouth: Mucous membranes are moist.      Pharynx: Oropharynx is clear. Uvula midline. No pharyngeal swelling or posterior oropharyngeal erythema.      Tonsils: No tonsillar exudate or tonsillar abscesses.   Eyes:      Conjunctiva/sclera: Conjunctivae normal.      Pupils: Pupils are equal, round, and reactive to light.   Cardiovascular:      Rate and Rhythm: Normal rate and regular rhythm.   Pulmonary:      Effort: Pulmonary effort is normal.      Breath sounds: Normal breath sounds.   Abdominal:      General: Bowel sounds are normal.      Palpations: Abdomen is soft.      Tenderness: There is no abdominal tenderness.   Musculoskeletal:      Cervical back: Normal range of motion and neck supple. No rigidity or tenderness.   Skin:     General: Skin is warm and dry.   Neurological:      General: No focal deficit present.      Mental Status: She is alert.           ED Course & MDM   Diagnoses as of 10/09/24 1457   Viral syndrome     Labs Reviewed   SARS-COV-2 PCR - Normal       Result Value    Coronavirus 2019, PCR Not Detected      Narrative:     This assay has received FDA Emergency Use Authorization (EUA) and is only  authorized for the duration of time that circumstances exist to justify the authorization of the emergency use of in vitro diagnostic tests for the detection of SARS-CoV-2 virus and/or diagnosis of COVID-19 infection under section 564(b)(1) of the Act, 21 U.S.C. 360bbb-3(b)(1). This assay is an in vitro diagnostic nucleic acid amplification test for the qualitative detection of SARS-CoV-2 from nasopharyngeal specimens and has been validated for use at [facility]. Negative results do not preclude COVID-19 infections and should not be used as the sole basis for diagnosis, treatment, or other management decisions.     INFLUENZA A AND B PCR - Normal    Flu A Result Not Detected      Flu B Result Not Detected      Narrative:     This assay is an in vitro diagnostic multiplex nucleic acid amplification test for the detection and discrimination of Influenza A & B from nasopharyngeal specimens, and has been validated for use at [facility]. Negative results do not preclude Influenza A/B infections, and should not be used as the sole basis for diagnosis, treatment, or other management decisions. If Influenza A/B and RSV PCR results are negative, testing for Parainfluenza virus, Adenovirus and Metapneumovirus is routinely performed for [facility] pediatric oncology and intensive care inpatients, and is available on other patients by placing an add-on request.     XR chest 2 views   Final Result   No evidence of acute cardiopulmonary process.        MACRO:   None        Signed by: Gui Zhang 10/9/2024 1:28 PM   Dictation workstation:   [id number]                  No data recorded     C[place]y Coma Scale Score: 15 (10/09/24 1232 : Francie Gonzalez RN)                           Medical Decision Making  Patient is a 29-year-old female with a past medical history significant for asthma presenting to the ED with flulike symptoms.  History was obtained from the patient.  Endorses various  URI symptoms since early September.  Is currently 2 months pregnant and has had a confirmed IUP.  Currently on Keflex for a UTI.  Denies fevers, chest pain, abdominal pain, nausea/vomiting, or vaginal bleeding.  On physical exam, patient is sitting comfortably and in no apparent distress.  Bilateral external ears, canals, and internal ears unremarkable.  Congestion is present from bilateral naris.  Oropharynx clear without edema or erythema.  Uvula midline.  No evidence of abscess/fluctuance.  Lungs CTA bilaterally without increased work of breathing.  Abdomen soft and nontender with normal bowel sounds.  Neck is supple with full ROM.  No rigidity or tenderness.  Remainder of exam as noted above.  Patient is afebrile and vital signs are stable.    Differential diagnosis considered includes pregnancy symptoms versus URI.  COVID and flu swabs obtained as well as chest x-ray.  Patient was treated in the ED with Tylenol and Flonase.  Viral swabs unremarkable.  Chest x-ray without evidence of acute cardiopulmonary process.  These findings were discussed with the patient.  It is possible her symptoms are secondary to her early pregnancy or another viral syndrome.  Discharged her with prescriptions for Tylenol and Flonase.  She is otherwise to rest and stay well-hydrated.  Emphasized the importance she follow-up at her scheduled OB/GYN appointment on Monday.  Patient verbalizes understanding.  She is agreeable to this plan and all of her questions were answered to satisfaction.  Patient was discharged from the ED in stable condition.        Procedure  Procedures     Alvina Marinelli PA-C  10/09/24 5517

## 2024-10-14 ENCOUNTER — LAB (OUTPATIENT)
Dept: LAB | Facility: LAB | Age: 29
End: 2024-10-14
Payer: COMMERCIAL

## 2024-10-14 ENCOUNTER — TELEPHONE (OUTPATIENT)
Dept: OBSTETRICS AND GYNECOLOGY | Facility: HOSPITAL | Age: 29
End: 2024-10-14

## 2024-10-14 ENCOUNTER — APPOINTMENT (OUTPATIENT)
Dept: OBSTETRICS AND GYNECOLOGY | Facility: CLINIC | Age: 29
End: 2024-10-14
Payer: COMMERCIAL

## 2024-10-14 VITALS — WEIGHT: 288 LBS | DIASTOLIC BLOOD PRESSURE: 70 MMHG | SYSTOLIC BLOOD PRESSURE: 124 MMHG | BODY MASS INDEX: 56.25 KG/M2

## 2024-10-14 DIAGNOSIS — E66.01 MORBID OBESITY (MULTI): ICD-10-CM

## 2024-10-14 DIAGNOSIS — O21.9 NAUSEA AND VOMITING IN PREGNANCY PRIOR TO 22 WEEKS GESTATION: ICD-10-CM

## 2024-10-14 DIAGNOSIS — Z34.91 FIRST TRIMESTER PREGNANCY (HHS-HCC): ICD-10-CM

## 2024-10-14 DIAGNOSIS — Z3A.08 8 WEEKS GESTATION OF PREGNANCY (HHS-HCC): ICD-10-CM

## 2024-10-14 DIAGNOSIS — O09.299 HISTORY OF PRE-ECLAMPSIA IN PRIOR PREGNANCY, CURRENTLY PREGNANT (HHS-HCC): ICD-10-CM

## 2024-10-14 DIAGNOSIS — Z34.81 PRENATAL CARE, SUBSEQUENT PREGNANCY IN FIRST TRIMESTER (HHS-HCC): ICD-10-CM

## 2024-10-14 DIAGNOSIS — Z86.711 HISTORY OF PULMONARY EMBOLISM: ICD-10-CM

## 2024-10-14 DIAGNOSIS — Z3A.08 8 WEEKS GESTATION OF PREGNANCY (HHS-HCC): Primary | ICD-10-CM

## 2024-10-14 PROBLEM — R76.8 RED BLOOD CELL ANTIBODY POSITIVE: Status: RESOLVED | Noted: 2024-07-25 | Resolved: 2024-10-14

## 2024-10-14 PROBLEM — O23.41 URINARY TRACT INFECTION IN MOTHER DURING FIRST TRIMESTER OF PREGNANCY (HHS-HCC): Status: ACTIVE | Noted: 2024-10-14

## 2024-10-14 PROBLEM — O14.90 PRE-ECLAMPSIA: Status: RESOLVED | Noted: 2020-02-13 | Resolved: 2024-10-14

## 2024-10-14 PROBLEM — R52 GENERALIZED PAIN: Status: RESOLVED | Noted: 2024-07-25 | Resolved: 2024-10-14

## 2024-10-14 PROBLEM — S39.012A STRAIN OF LUMBAR REGION: Status: RESOLVED | Noted: 2024-07-25 | Resolved: 2024-10-14

## 2024-10-14 PROBLEM — K52.9 ENTERITIS: Status: RESOLVED | Noted: 2024-07-25 | Resolved: 2024-10-14

## 2024-10-14 PROBLEM — S69.90XA INJURY OF WRIST: Status: RESOLVED | Noted: 2024-07-25 | Resolved: 2024-10-14

## 2024-10-14 PROBLEM — O36.80X0 PREGNANCY OF UNKNOWN ANATOMIC LOCATION (HHS-HCC): Status: RESOLVED | Noted: 2024-09-11 | Resolved: 2024-10-14

## 2024-10-14 PROBLEM — I26.99 PULMONARY EMBOLISM: Status: RESOLVED | Noted: 2021-03-04 | Resolved: 2024-10-14

## 2024-10-14 PROBLEM — Z12.4 CERVICAL CANCER SCREENING: Status: RESOLVED | Noted: 2024-05-31 | Resolved: 2024-10-14

## 2024-10-14 PROBLEM — Z86.718 HISTORY OF DVT (DEEP VEIN THROMBOSIS): Status: ACTIVE | Noted: 2024-10-14

## 2024-10-14 PROBLEM — M54.9 BACK PAIN: Status: RESOLVED | Noted: 2024-07-25 | Resolved: 2024-10-14

## 2024-10-14 PROBLEM — R07.0 THROAT PAIN: Status: RESOLVED | Noted: 2024-07-25 | Resolved: 2024-10-14

## 2024-10-14 PROBLEM — M79.606 PAIN IN LOWER LIMB: Status: RESOLVED | Noted: 2024-07-25 | Resolved: 2024-10-14

## 2024-10-14 PROBLEM — O16.9 HYPERTENSION IN PREGNANCY, ANTEPARTUM (HHS-HCC): Status: RESOLVED | Noted: 2024-07-25 | Resolved: 2024-10-14

## 2024-10-14 PROBLEM — R10.9 ABDOMINAL PAIN: Status: RESOLVED | Noted: 2024-07-25 | Resolved: 2024-10-14

## 2024-10-14 PROBLEM — R10.33 PERIUMBILICAL ABDOMINAL PAIN: Status: RESOLVED | Noted: 2018-12-06 | Resolved: 2024-10-14

## 2024-10-14 PROBLEM — K52.9 INFLAMMATION OF STOMACH AND INTESTINE: Status: RESOLVED | Noted: 2024-05-10 | Resolved: 2024-10-14

## 2024-10-14 PROBLEM — Z30.432 ENCOUNTER FOR IUD REMOVAL: Status: RESOLVED | Noted: 2024-05-31 | Resolved: 2024-10-14

## 2024-10-14 PROBLEM — B37.31 CANDIDIASIS OF VAGINA: Status: RESOLVED | Noted: 2024-07-25 | Resolved: 2024-10-14

## 2024-10-14 PROBLEM — B34.9 VIRAL INFECTION: Status: RESOLVED | Noted: 2024-07-25 | Resolved: 2024-10-14

## 2024-10-14 PROBLEM — N93.9 VAGINAL BLEEDING: Status: RESOLVED | Noted: 2024-07-25 | Resolved: 2024-10-14

## 2024-10-14 PROBLEM — N89.8 VAGINAL DISCHARGE: Status: RESOLVED | Noted: 2024-07-25 | Resolved: 2024-10-14

## 2024-10-14 PROBLEM — N89.8 VAGINAL ITCHING: Status: RESOLVED | Noted: 2024-07-25 | Resolved: 2024-10-14

## 2024-10-14 PROBLEM — R68.83 CHILL: Status: RESOLVED | Noted: 2024-07-25 | Resolved: 2024-10-14

## 2024-10-14 PROBLEM — R19.7 DIARRHEA OF PRESUMED INFECTIOUS ORIGIN: Status: RESOLVED | Noted: 2018-12-06 | Resolved: 2024-10-14

## 2024-10-14 PROBLEM — R51.9 HEADACHE: Status: RESOLVED | Noted: 2024-07-25 | Resolved: 2024-10-14

## 2024-10-14 PROBLEM — R10.2 PELVIC PAIN IN FEMALE: Status: RESOLVED | Noted: 2024-07-25 | Resolved: 2024-10-14

## 2024-10-14 PROBLEM — U07.1 DISEASE DUE TO SEVERE ACUTE RESPIRATORY SYNDROME CORONAVIRUS 2 (SARS-COV-2): Status: RESOLVED | Noted: 2024-07-25 | Resolved: 2024-10-14

## 2024-10-14 PROBLEM — E53.8 FOLIC ACID DEFICIENCY: Status: RESOLVED | Noted: 2024-07-25 | Resolved: 2024-10-14

## 2024-10-14 PROBLEM — R06.00 DYSPNEA: Status: RESOLVED | Noted: 2024-07-25 | Resolved: 2024-10-14

## 2024-10-14 PROBLEM — R07.9 CHEST PAIN: Status: RESOLVED | Noted: 2024-07-25 | Resolved: 2024-10-14

## 2024-10-14 LAB
ABO GROUP (TYPE) IN BLOOD: NORMAL
ANTIBODY SCREEN: NORMAL
CREAT UR-MCNC: 119.6 MG/DL (ref 20–320)
PREGNANCY TEST URINE, POC: POSITIVE
PROT UR-ACNC: 10 MG/DL (ref 5–24)
PROT/CREAT UR: 0.08 MG/MG CREAT (ref 0–0.17)
RH FACTOR (ANTIGEN D): NORMAL

## 2024-10-14 PROCEDURE — 86317 IMMUNOASSAY INFECTIOUS AGENT: CPT

## 2024-10-14 PROCEDURE — 83020 HEMOGLOBIN ELECTROPHORESIS: CPT

## 2024-10-14 PROCEDURE — 87340 HEPATITIS B SURFACE AG IA: CPT

## 2024-10-14 PROCEDURE — 87591 N.GONORRHOEAE DNA AMP PROB: CPT

## 2024-10-14 PROCEDURE — 84156 ASSAY OF PROTEIN URINE: CPT

## 2024-10-14 PROCEDURE — 83036 HEMOGLOBIN GLYCOSYLATED A1C: CPT

## 2024-10-14 PROCEDURE — 99214 OFFICE O/P EST MOD 30 MIN: CPT

## 2024-10-14 PROCEDURE — 83021 HEMOGLOBIN CHROMOTOGRAPHY: CPT

## 2024-10-14 PROCEDURE — 86850 RBC ANTIBODY SCREEN: CPT

## 2024-10-14 PROCEDURE — 86900 BLOOD TYPING SEROLOGIC ABO: CPT

## 2024-10-14 PROCEDURE — 36415 COLL VENOUS BLD VENIPUNCTURE: CPT

## 2024-10-14 PROCEDURE — 86901 BLOOD TYPING SEROLOGIC RH(D): CPT

## 2024-10-14 PROCEDURE — 82570 ASSAY OF URINE CREATININE: CPT

## 2024-10-14 PROCEDURE — 81025 URINE PREGNANCY TEST: CPT

## 2024-10-14 PROCEDURE — 87491 CHLMYD TRACH DNA AMP PROBE: CPT

## 2024-10-14 RX ORDER — PYRIDOXINE HCL (VITAMIN B6) 25 MG
50 TABLET ORAL 3 TIMES DAILY PRN
Qty: 90 TABLET | Refills: 6 | Status: SHIPPED | OUTPATIENT
Start: 2024-10-14

## 2024-10-14 ASSESSMENT — EDINBURGH POSTNATAL DEPRESSION SCALE (EPDS)
THE THOUGHT OF HARMING MYSELF HAS OCCURRED TO ME: NEVER
I HAVE FELT SCARED OR PANICKY FOR NO GOOD REASON: NO, NOT AT ALL
I HAVE BEEN ANXIOUS OR WORRIED FOR NO GOOD REASON: HARDLY EVER
I HAVE BLAMED MYSELF UNNECESSARILY WHEN THINGS WENT WRONG: NOT VERY OFTEN
I HAVE BEEN SO UNHAPPY THAT I HAVE HAD DIFFICULTY SLEEPING: NOT AT ALL
TOTAL SCORE: 3
I HAVE BEEN SO UNHAPPY THAT I HAVE BEEN CRYING: NO, NEVER
I HAVE FELT SAD OR MISERABLE: NO, NOT AT ALL
I HAVE BEEN ABLE TO LAUGH AND SEE THE FUNNY SIDE OF THINGS: AS MUCH AS I ALWAYS COULD
I HAVE LOOKED FORWARD WITH ENJOYMENT TO THINGS: AS MUCH AS I EVER DID
THINGS HAVE BEEN GETTING ON TOP OF ME: NO, MOST OF THE TIME I HAVE COPED QUITE WELL

## 2024-10-14 NOTE — PROGRESS NOTES
"Assessment/Plan     Routine Prenatal Care  - Patient risks out of midwifery care secondary to BMI >50. Advised patient to schedule follow-up visits with a [facility] physician  - Discussed routine OB labs. Urine culture, Syphilis, CMP, CBC, Trich, and GC/CT were already collected on 10/3 in ED and were reviewed. HepC, Syphilis, and HIV were already collected on 9/11 in ED and were reviewed. Remainder of routine labs were ordered today.   - Education provided r/t nutrition, folic acid supplementation, dietary guidelines, exercise, travel, smoking, alcohol, caffeine, and drug use.  - NT ultrasound is already scheduled for 11/13  - Pt counseled on genetic testing options and provided literature in the obstetric folder. First line screening options, including cffDNA and NT ultrasound, were discussed and offered.  Benefits, drawbacks, and limitations explained.  Pt interested in both - orders placed.  - Warning s/s discussed and SAB precautions reviewed. Pt provided on-call number and instructed on when to call.    UTI Affecting Pregnancy  - Pt currently on day 4/7 of PO Keflex  - Will collect JAMES at next visit    Nausea During Pregnancy  - Discussed lifestyle modifications including small frequent meals, keeping dry crackers at bedside to eat upon awakening  - Discussed complementary treatments including peppermint and ginger products   - Recommended 50mg Vitamin B6 TID, and/or 12.5mg Unisom nightly.   - Vitamin B6 and Unisom sent to preferred pharmacy    Pre-pregnancy BMI 59.96   - Obesity in Pregnancy - BMI >30; Limiting weight gain to 11-20 lbs through healthy eating habits and exercise reviewed. Recommended \"Real Food for Pregnancy\" by Jessica Louis. Pt aware that a BMI of 50 or higher warrants transfer to physician care and delivery at tertiary care center.    History of PEC in prior pregnancy  - Pt DOES meet criteria for ASA prophylaxis. Will start 162mg ASA daily at 12-16 weeks.  - Baseline PCR ordered with NOB labs. CBC " and CMP were wnl in ED on 10/3.     History of DVT, PE  - MFM referral placed    Health Maintenance  - counseled on seatbelt use, continued bi-annual visits for dental care, and annual visits with PCP  - Flu: Not yet vaccinated for 6722-1013 season; pt declines today    Cervical Cancer Screening  - PAP up to date  - ASCCP guidelines reviewed with patient; next PAP due 2027    F/U 4 weeks. Review labs. Collect urine culture JAMES. Send Rx for bASA.     TOYIN Vanegas-EDSON    Subjective     Shefali MURRAY Arango is a 29 y.o.  at 8w5d with a working estimated date of delivery of 2025, by Ultrasound who presents for an initial prenatal visit. This pregnancy is unplanned. Pt is accepting.    She shares that she tested positive for a UTI on 10/3 at an ED visit. She is on day 4 of 7 of PO Keflex.    Partner: Alexandre  Employment: Unemployed    Patient currently experiencing: Nausea, Urinary frequency    LMP: 24; Prior menstrual cycle irregular due to LNG IUD  Bleeding since LMP: yes - spotting beginning of month which has since resolved  Taking prenatal vitamin: Yes  Ultrasound completed this pregnancy: Yes - dating scan performed 24 revealing 6w5d IUP    OB History    Para Term  AB Living   6 4 2 2 1 4   SAB IAB Ectopic Multiple Live Births     1     4      # Outcome Date GA Lbr Kali/2nd Weight Sex Type Anes PTL Lv   6 Current            5  21 36w0d  2.92 kg M Vag-Spont EPI  RADHA      Complications: Pre-eclampsia   4 Term 20 37w0d  3.033 kg F Vag-Spont EPI  RADHA      Complications: Pre-eclampsia   3 Term 16 37w0d  2.92 kg F Vag-Spont EPI  RADHA      Complications: Pre-eclampsia   2  14 36w0d  2.523 kg M Vag-Spont EPI  RADHA      Complications: Pre-eclampsia   1 IAB 2014             [place]  Depression Scale Total: 3  Prior pregnancy complications: obesity, pre-eclampsia    Preeclampsia Risk  Moderate risk factors include: Obesity and   American Race. High risk factors include: Personal history of hypertensive disorders of pregnancy.    PAP History: last PAP 5/31/24 nml  Pt denies known history of abnormals     Past Medical History:   Diagnosis Date    Abdominal pain 07/25/2024    Comment on above: ABD PAIN     ABDOMINAL PAIN      Asthma     Bronchitis     Candidiasis of vagina 07/25/2024    DVT (deep vein thrombosis) in pregnancy ([facility])     Essential (primary) hypertension 10/31/2014    Elevated BP    History of IBS     Hx of chlamydia infection     Hx of pulmonary embolus     Morbid (severe) obesity due to excess calories (Multi) 12/16/2019    Morbid obesity with BMI of 50.0-59.9, adult    Pre-eclampsia 02/13/2020    Pulmonary embolism 03/04/2021      Past Surgical History:   Procedure Laterality Date    CHOLECYSTECTOMY      GALLBLADDER SURGERY  03/15/2018    Gallbladder Surgery      Social History     Tobacco Use    Smoking status: Never    Smokeless tobacco: Never   Vaping Use    Vaping status: Never Used   Substance Use Topics    Alcohol use: Not Currently     Alcohol/week: 1.0 - 2.0 standard drink of alcohol     Types: 1 - 2 Glasses of wine per week    Drug use: Not Currently     Types: Marijuana      Current Medications: PNV gummies    Objective     /70   Wt 131 kg (288 lb)   LMP 08/14/2024 (Approximate)   BMI 56.25 kg/m²     Physical Exam  Vitals reviewed.   Constitutional:       General: She is not in acute distress.     Appearance: Normal appearance. She is obese.   HENT:      Head: Normocephalic and atraumatic.   Cardiovascular:      Rate and Rhythm: Normal rate and regular rhythm.      Heart sounds: Normal heart sounds, S1 normal and S2 normal.   Pulmonary:      Effort: Pulmonary effort is normal.      Breath sounds: Normal breath sounds.   Abdominal:      General: Abdomen is flat.      Palpations: Abdomen is soft.      Tenderness: There is no abdominal tenderness.      Comments: Gravid uterus   Musculoskeletal:          General: Normal range of motion.      Cervical back: Normal range of motion.   Skin:     General: Skin is warm and dry.   Neurological:      Mental Status: She is alert and oriented to person, place, and time.   Psychiatric:         Mood and Affect: Mood normal.         Behavior: Behavior normal.         Thought Content: Thought content normal.         Judgment: Judgment normal.          Postpartum Depression: Low Risk  (10/14/2024)    [place]  Depression Scale     Last EPDS Total Score: 3     Last EPDS Self Harm Result: Never        Pregnancy Problems (from 10/14/24 to present)       Problem Noted Resolved    8 weeks gestation of pregnancy ([facility]) 10/14/2024 by MARILYN Vanegas No    Priority:  Medium      Overview Addendum 10/14/2024  2:21 PM by MARILYN Vanegas     Desired provider in labor: [] CNM  [x] Physician  [] Blood Products: [] Yes, accepts [] No, needs counseling  [x] Initial BMI: 59.96   [] Prenatal Labs:   [x] Cervical Cancer Screening up to date: PAP 24 nml  [x] Rh status: Neg  [] Genetic Screening:    [] NT US: (11-13 wks)  [] Baby ASA (if indicated): risks include history of PEC, obesity,  race  [x] Pregnancy dated by: 6w5d scan    [] Anatomy US: (19-20 wks)  [] Federal Sterilization consent signed (if indicated):  [] 1hr GCT at 24-28wks:  [] Rhogam (if indicated):   [] Fetal Surveillance (if indicated):  [] Tdap (27-32 wks, may be given up to 36 wks if initial window missed):   [] RSV (32-36 wks) (Sept. to end of ):   [x] Flu Vaccine: offered 10/14, declines    [] Breastfeeding:  [] Postpartum Birth control method:   [] GBS at 36 - 37 wks:  [] 39 weeks discussion of IOL vs. Expectant management:  [] Mode of delivery ( anticipated ):           Urinary tract infection in mother during first trimester of pregnancy ([facility]) 10/14/2024 by MARILYN Vanegas No    Priority:  Medium      Overview Signed 10/14/2024  2:19 PM by Tirni Monroy  MARILYN     10/3 urine culture positive in ED  10/11 started PO Keflex  [] JAMES           History of DVT (deep vein thrombosis) 10/14/2024 by MARILYN Vanegas No    Priority:  Medium      History of pre-eclampsia in prior pregnancy, currently pregnant ([facility]) 10/14/2024 by MARIYLN Vanegas No    Priority:  Medium      Overview Addendum 10/14/2024  2:31 PM by MARILYN Vanegas     Pt reports history of PEC with all 4 prior pregnancies  CMP and CBC were collected in ED on 10/3, wnl. Baseline PCR collected with rest of NOB labs.  [] bASA at 12 weeks         History of pulmonary embolism 7/25/2024 by Elvia Samayoa RN No    Priority:  Medium      Overview Signed 10/14/2024  1:54 PM by MARILYN Vanegas     2021 per patient         Maternal asthma complicating pregnancy ([facility]) 7/25/2024 by Elvia Samayoa RN No    Priority:  Medium      Overview Addendum 10/14/2024  2:22 PM by MARILYN Vanegas     Reports albuterol use every other day  Denies recent hospitalization / intubation for asthma exacerbation         Morbid obesity (Multi) 5/10/2024 by Isela Berry PA-C No    Priority:  Medium      Overview Addendum 10/14/2024  2:22 PM by MARILYN Vanegas     Pregravid BMI 59.96  For physician care  [] 30 week growth scan  [] 36 week growth scan  [] BPP or NST weekly from 34 weeks until delivery  [] Recommend delivery between 39 0/7 to 39 6/7 weeks gestation

## 2024-10-14 NOTE — TELEPHONE ENCOUNTER
----- Message from Trini Hartleyson sent at 10/14/2024  2:35 PM EDT -----  Regarding: needs physician care, please remove from Jamir schedule  Hi there,    I saw this patient today for a NOB visit. I advised her to schedule with a physician, and it looks like she was accidentally scheduled with Tiffany. Could she please be rescheduled with a physician? She risks out of midwifery care due to BMI >50, history of DVT/PE, history of PEC, among other health concerns.     Debra    Patient rpn switched to Dr. Rucker as she risks out of midwifery care  Mara Milligan RN

## 2024-10-15 PROBLEM — Z67.91 RH NEGATIVE STATE IN ANTEPARTUM PERIOD (HHS-HCC): Status: ACTIVE | Noted: 2024-10-15

## 2024-10-15 PROBLEM — O26.899 RH NEGATIVE STATE IN ANTEPARTUM PERIOD (HHS-HCC): Status: ACTIVE | Noted: 2024-10-15

## 2024-10-15 LAB
C TRACH RRNA SPEC QL NAA+PROBE: NEGATIVE
EST. AVERAGE GLUCOSE BLD GHB EST-MCNC: 97 MG/DL
HBA1C MFR BLD: 5 %
HBV SURFACE AG SERPL QL IA: NONREACTIVE
HEMOGLOBIN A2: 3 % (ref 2–3.5)
HEMOGLOBIN A: 96 % (ref 95.8–98)
HEMOGLOBIN F: 1 % (ref 0–2)
HEMOGLOBIN IDENTIFICATION INTERPRETATION: NORMAL
N GONORRHOEA DNA SPEC QL PROBE+SIG AMP: NEGATIVE
PATH REVIEW-HGB IDENTIFICATION: NORMAL
RUBV IGG SERPL IA-ACNC: 1.1 IA
RUBV IGG SERPL QL IA: POSITIVE

## 2024-10-18 ENCOUNTER — APPOINTMENT (OUTPATIENT)
Dept: RADIOLOGY | Facility: HOSPITAL | Age: 29
End: 2024-10-18
Payer: COMMERCIAL

## 2024-10-18 ENCOUNTER — HOSPITAL ENCOUNTER (EMERGENCY)
Facility: HOSPITAL | Age: 29
Discharge: HOME | End: 2024-10-18
Payer: COMMERCIAL

## 2024-10-18 VITALS
OXYGEN SATURATION: 97 % | TEMPERATURE: 98.2 F | HEART RATE: 80 BPM | DIASTOLIC BLOOD PRESSURE: 67 MMHG | HEIGHT: 63 IN | BODY MASS INDEX: 50.32 KG/M2 | SYSTOLIC BLOOD PRESSURE: 128 MMHG | RESPIRATION RATE: 20 BRPM | WEIGHT: 284 LBS

## 2024-10-18 DIAGNOSIS — R31.9 URINARY TRACT INFECTION WITH HEMATURIA, SITE UNSPECIFIED: Primary | ICD-10-CM

## 2024-10-18 DIAGNOSIS — N39.0 URINARY TRACT INFECTION WITH HEMATURIA, SITE UNSPECIFIED: Primary | ICD-10-CM

## 2024-10-18 LAB
APPEARANCE UR: ABNORMAL
B-HCG SERPL-ACNC: ABNORMAL MIU/ML
BACTERIA #/AREA URNS AUTO: ABNORMAL /HPF
BILIRUB UR STRIP.AUTO-MCNC: NEGATIVE MG/DL
COLOR UR: YELLOW
GLUCOSE UR STRIP.AUTO-MCNC: NORMAL MG/DL
HOLD SPECIMEN: NORMAL
KETONES UR STRIP.AUTO-MCNC: ABNORMAL MG/DL
LEUKOCYTE ESTERASE UR QL STRIP.AUTO: ABNORMAL
MUCOUS THREADS #/AREA URNS AUTO: ABNORMAL /LPF
NITRITE UR QL STRIP.AUTO: NEGATIVE
PH UR STRIP.AUTO: 6.5 [PH]
PREGNANCY TEST URINE, POC: POSITIVE
PROT UR STRIP.AUTO-MCNC: ABNORMAL MG/DL
RBC # UR STRIP.AUTO: NEGATIVE /UL
RBC #/AREA URNS AUTO: ABNORMAL /HPF
SP GR UR STRIP.AUTO: 1.02
SQUAMOUS #/AREA URNS AUTO: ABNORMAL /HPF
UROBILINOGEN UR STRIP.AUTO-MCNC: NORMAL MG/DL
WBC #/AREA URNS AUTO: ABNORMAL /HPF

## 2024-10-18 PROCEDURE — 87086 URINE CULTURE/COLONY COUNT: CPT

## 2024-10-18 PROCEDURE — 81001 URINALYSIS AUTO W/SCOPE: CPT

## 2024-10-18 PROCEDURE — 99284 EMERGENCY DEPT VISIT MOD MDM: CPT

## 2024-10-18 PROCEDURE — 76817 TRANSVAGINAL US OBSTETRIC: CPT

## 2024-10-18 PROCEDURE — 81025 URINE PREGNANCY TEST: CPT

## 2024-10-18 PROCEDURE — 84702 CHORIONIC GONADOTROPIN TEST: CPT

## 2024-10-18 PROCEDURE — 36415 COLL VENOUS BLD VENIPUNCTURE: CPT

## 2024-10-18 RX ORDER — CEPHALEXIN 250 MG/1
500 CAPSULE ORAL ONCE
Status: CANCELLED | OUTPATIENT
Start: 2024-10-18 | End: 2024-10-18

## 2024-10-18 RX ORDER — CEPHALEXIN 500 MG/1
500 CAPSULE ORAL 2 TIMES DAILY
Qty: 14 CAPSULE | Refills: 0 | Status: CANCELLED | OUTPATIENT
Start: 2024-10-18 | End: 2024-10-25

## 2024-10-18 RX ORDER — CEPHALEXIN 500 MG/1
500 CAPSULE ORAL 4 TIMES DAILY
Qty: 28 CAPSULE | Refills: 0 | Status: SHIPPED | OUTPATIENT
Start: 2024-10-18 | End: 2024-10-24 | Stop reason: WASHOUT

## 2024-10-18 ASSESSMENT — LIFESTYLE VARIABLES
HAVE YOU EVER FELT YOU SHOULD CUT DOWN ON YOUR DRINKING: NO
TOTAL SCORE: 0
EVER FELT BAD OR GUILTY ABOUT YOUR DRINKING: NO
HAVE PEOPLE ANNOYED YOU BY CRITICIZING YOUR DRINKING: NO
EVER HAD A DRINK FIRST THING IN THE MORNING TO STEADY YOUR NERVES TO GET RID OF A HANGOVER: NO

## 2024-10-18 ASSESSMENT — PAIN SCALES - GENERAL: PAINLEVEL_OUTOF10: 7

## 2024-10-18 ASSESSMENT — PAIN - FUNCTIONAL ASSESSMENT: PAIN_FUNCTIONAL_ASSESSMENT: 0-10

## 2024-10-18 NOTE — ED PROVIDER NOTES
HPI   No chief complaint on file.      29-year-old female presents for chief complaint of lower abdominal discomfort.  She is approximately 10 weeks pregnant, , states that she is high risk due to previous pregnancies with preeclampsia.  Denies any vaginal discharge or bleeding endorses negative recent STD tests.  Endorses also some upper respiratory symptoms that have been intermittent for about 2 months now.  She is being worked up by primary care for this.  Denies any other complaints at this time.  Of note, she was curious about her rubella titer that was drawn.  I was able to reassure her about this results.              Patient History   Past Medical History:   Diagnosis Date    Abdominal pain 2024    Comment on above: ABD PAIN     ABDOMINAL PAIN      Asthma     Bronchitis     Candidiasis of vagina 2024    DVT (deep vein thrombosis) in pregnancy ([facility])     Essential (primary) hypertension 10/31/2014    Elevated BP    History of IBS     Hx of chlamydia infection     Hx of pulmonary embolus     Morbid (severe) obesity due to excess calories (Multi) 2019    Morbid obesity with BMI of 50.0-59.9, adult    Pre-eclampsia 2020    Pulmonary embolism 2021     Past Surgical History:   Procedure Laterality Date    CHOLECYSTECTOMY      GALLBLADDER SURGERY  03/15/2018    Gallbladder Surgery     Family History   Problem Relation Name Age of Onset    Heart failure Mother      Coronary artery disease Mother      Hypertension Mother      Deep vein thrombosis Mother      Seizures Mother      COPD Mother      Thyroid disease Mother      Colon cancer Maternal Grandmother      Heart failure Maternal Grandfather      Hypertension Maternal Grandfather      COPD Maternal Grandfather      Diabetes Paternal Grandmother      Breast cancer Other       Social History     Tobacco Use    Smoking status: Never    Smokeless tobacco: Never   Vaping Use    Vaping status: Never Used   Substance Use Topics     Alcohol use: Not Currently     Alcohol/week: 1.0 - 2.0 standard drink of alcohol     Types: 1 - 2 Glasses of wine per week    Drug use: Not Currently     Types: Marijuana       Physical Exam   ED Triage Vitals [10/18/24 0940]   Temperature Heart Rate Respirations BP   35.4 °C (95.8 °F) 87 17 134/73      Pulse Ox Temp Source Heart Rate Source Patient Position   99 % Temporal Monitor --      BP Location FiO2 (%)     -- --       Physical Exam  Vitals and nursing note reviewed. Exam conducted with a chaperone present.   Constitutional:       General: She is not in acute distress.     Appearance: She is well-developed.   HENT:      Head: Normocephalic and atraumatic.   Eyes:      Conjunctiva/sclera: Conjunctivae normal.   Cardiovascular:      Rate and Rhythm: Normal rate and regular rhythm.      Heart sounds: No murmur heard.  Pulmonary:      Effort: Pulmonary effort is normal. No respiratory distress.      Breath sounds: Normal breath sounds.   Abdominal:      Palpations: Abdomen is soft.      Tenderness: There is no abdominal tenderness.   Genitourinary:     Exam position: Lithotomy position.      Vagina: Normal.      Cervix: Normal.      Uterus: Normal.       Adnexa: Right adnexa normal and left adnexa normal.   Musculoskeletal:         General: No swelling.      Cervical back: Neck supple.   Skin:     General: Skin is warm and dry.      Capillary Refill: Capillary refill takes less than 2 seconds.   Neurological:      Mental Status: She is alert.   Psychiatric:         Mood and Affect: Mood normal.           ED Course & MDM   Diagnoses as of 10/18/24 1502   Urinary tract infection with hematuria, site unspecified                 No data recorded     [place] Coma Scale Score: 15 (10/18/24 0942 : Arlette Rucker RN)                           Medical Decision Making  Vital signs reviewed, unremarkable at this time.  Patient is well-appearing and in no apparent distress.  Speaks full sentences without difficulty.   Diagnostic testing performed.  Pelvic exam reassuring.  Point-of-care ultrasound attempted but unsuccessful in finding fetus.  We will perform formal ultrasound with patient's complaint of lower abdominal pain.  This pain started last night around 11.  Denies injury. Labs reassuring with elevated pregnancy hormone as expected with pregnancy.  Bacteria seen in microscopic urine and so will be treated with Keflex.  Will give meds at home with as well.  Advised to take as directed.  Ultrasound unremarkable and shows positive IUP.  Patient was advised of these findings and encouraged to return with any new or worsening symptoms and to follow-up with women's health.  Patient in agreement.  Discharged in stable condition.        Procedure  Procedures     Oscar Mckeon, APRN-CNP  10/18/24 0568

## 2024-10-18 NOTE — ED TRIAGE NOTES
Pt presents with a c/o abd cramping and vaginal pain, high risk at 10 wks OB,  , pt had a positive test for ruebella and was told to come to ED for treatment

## 2024-10-19 LAB — BACTERIA UR CULT: NORMAL

## 2024-10-22 ENCOUNTER — HOSPITAL ENCOUNTER (EMERGENCY)
Facility: HOSPITAL | Age: 29
Discharge: HOME | End: 2024-10-23
Payer: COMMERCIAL

## 2024-10-22 ENCOUNTER — CLINICAL SUPPORT (OUTPATIENT)
Dept: EMERGENCY MEDICINE | Facility: HOSPITAL | Age: 29
End: 2024-10-22
Payer: COMMERCIAL

## 2024-10-22 VITALS
SYSTOLIC BLOOD PRESSURE: 138 MMHG | TEMPERATURE: 97.5 F | HEART RATE: 88 BPM | DIASTOLIC BLOOD PRESSURE: 86 MMHG | OXYGEN SATURATION: 99 %

## 2024-10-22 DIAGNOSIS — R11.0 NAUSEA: Primary | ICD-10-CM

## 2024-10-22 LAB
ATRIAL RATE: 98 BPM
P AXIS: 61 DEGREES
P OFFSET: 209 MS
P ONSET: 166 MS
PR INTERVAL: 114 MS
Q ONSET: 223 MS
QRS COUNT: 16 BEATS
QRS DURATION: 64 MS
QT INTERVAL: 324 MS
QTC CALCULATION(BAZETT): 413 MS
QTC FREDERICIA: 381 MS
R AXIS: 42 DEGREES
T AXIS: 7 DEGREES
T OFFSET: 385 MS
VENTRICULAR RATE: 98 BPM

## 2024-10-22 PROCEDURE — 93005 ELECTROCARDIOGRAM TRACING: CPT

## 2024-10-22 PROCEDURE — 87637 SARSCOV2&INF A&B&RSV AMP PRB: CPT

## 2024-10-22 PROCEDURE — 99283 EMERGENCY DEPT VISIT LOW MDM: CPT

## 2024-10-22 PROCEDURE — 99284 EMERGENCY DEPT VISIT MOD MDM: CPT

## 2024-10-22 PROCEDURE — 2500000005 HC RX 250 GENERAL PHARMACY W/O HCPCS: Mod: SE

## 2024-10-22 RX ORDER — WATER
300 LIQUID (ML) MISCELLANEOUS ONCE
Status: COMPLETED | OUTPATIENT
Start: 2024-10-22 | End: 2024-10-22

## 2024-10-22 RX ORDER — ONDANSETRON 4 MG/1
4 TABLET, ORALLY DISINTEGRATING ORAL ONCE
Status: COMPLETED | OUTPATIENT
Start: 2024-10-22 | End: 2024-10-22

## 2024-10-22 ASSESSMENT — LIFESTYLE VARIABLES
EVER FELT BAD OR GUILTY ABOUT YOUR DRINKING: NO
HAVE PEOPLE ANNOYED YOU BY CRITICIZING YOUR DRINKING: NO
TOTAL SCORE: 0
HAVE YOU EVER FELT YOU SHOULD CUT DOWN ON YOUR DRINKING: NO
EVER HAD A DRINK FIRST THING IN THE MORNING TO STEADY YOUR NERVES TO GET RID OF A HANGOVER: NO

## 2024-10-22 ASSESSMENT — PAIN - FUNCTIONAL ASSESSMENT: PAIN_FUNCTIONAL_ASSESSMENT: 0-10

## 2024-10-22 ASSESSMENT — PAIN SCALES - GENERAL: PAINLEVEL_OUTOF10: 0 - NO PAIN

## 2024-10-22 NOTE — ED TRIAGE NOTES
"Pt presents to ED for c/o n/v. Pt states she is 10 weeks OB. Pt states she also lost her taste and is having a hard time breathing. Pt also states her \"ears are closing.\" Pt denies vaginal bleeding, abnormal discharge, or fluid leakage.   "

## 2024-10-23 PROBLEM — Z87.59 HISTORY OF PULMONARY EMBOLUS DURING PREGNANCY: Status: ACTIVE | Noted: 2024-07-25

## 2024-10-23 LAB
FLUAV RNA RESP QL NAA+PROBE: NOT DETECTED
FLUBV RNA RESP QL NAA+PROBE: NOT DETECTED
RSV RNA RESP QL NAA+PROBE: NOT DETECTED
SARS-COV-2 RNA RESP QL NAA+PROBE: NOT DETECTED

## 2024-10-23 RX ORDER — ONDANSETRON 4 MG/1
4 TABLET, FILM COATED ORAL EVERY 6 HOURS
Qty: 40 TABLET | Refills: 0 | Status: SHIPPED | OUTPATIENT
Start: 2024-10-23 | End: 2024-11-01 | Stop reason: HOSPADM

## 2024-10-23 NOTE — ASSESSMENT & PLAN NOTE
- Patients who have their primary VTE in the setting of a recurrent risk factor (eg pregnancy or estrogen containing contraception) have a risk of recurrence of VTE in pregnancy of 4.5% without anticoagulation. Those with a non-recurrent risk factor (surgery, immobilization, infection) have a recurrence risk of 2.7%. Inherited thrombophilias increase the risk to varying degrees depending on the thrombophilia  - Full thrombophilia work-up including Factor V Leiden, Prothrombin gene mutation, anti-thrombin III, Protein C and S, and APLS work-up. The patient has previously had negative Factor V Leiden, Prothrombin gene mutation, APLS work-up, protein C/S and anti-thrombin. No identified inherited or acquired thrombophilia  - Given VTE associated with pregnancy, intermediate dose anticoagulation with Lovenox 0.5mg/kg BID is recommended (Lovenox 60mg BID)  - Recommend TRC500 starting in the first trimester in these cases with increased pregnancy surveillance.

## 2024-10-23 NOTE — ED PROVIDER NOTES
HPI   Chief Complaint   Patient presents with   • Nausea   • Vomiting During Pregnancy       29-year-old female with past medical history of asthma, hypertension, IBS, pulmonary embolism presents emergency department for chief complaint of nausea, vomiting and flulike symptoms.  Patient states that she been seen multiple times for persistent flulike symptoms over the past couple months as she has had some congestion, cough, ear pressure that she has been evaluated for but never had a formal diagnosis as all COVID and flu swabs have been negative.  Patient reports that she is still having the symptoms at this time as well as occasional nausea and vomiting throughout her pregnancy.  Patient does report some urinary frequency which has been going on for some time now but states that recently she was treated for UTI couple weeks ago.  Fever, chills, abdominal pain, vaginal bleeding.      ROS  Denies CP/SOB, numbness/tingling, weakness, rash, cough and any other symptoms.                Patient History   Past Medical History:   Diagnosis Date   • Abdominal pain 07/25/2024    Comment on above: ABD PAIN     ABDOMINAL PAIN     • Asthma    • Bronchitis    • Candidiasis of vagina 07/25/2024   • DVT (deep vein thrombosis) in pregnancy ([facility])    • Essential (primary) hypertension 10/31/2014    Elevated BP   • History of IBS    • Hx of chlamydia infection    • Hx of pulmonary embolus    • Morbid (severe) obesity due to excess calories (Multi) 12/16/2019    Morbid obesity with BMI of 50.0-59.9, adult   • Pre-eclampsia 02/13/2020   • Pulmonary embolism 03/04/2021     Past Surgical History:   Procedure Laterality Date   • CHOLECYSTECTOMY     • GALLBLADDER SURGERY  03/15/2018    Gallbladder Surgery     Family History   Problem Relation Name Age of Onset   • Heart failure Mother     • Coronary artery disease Mother     • Hypertension Mother     • Deep vein thrombosis Mother     • Seizures Mother     • COPD Mother     • Thyroid  disease Mother     • Colon cancer Maternal Grandmother     • Heart failure Maternal Grandfather     • Hypertension Maternal Grandfather     • COPD Maternal Grandfather     • Diabetes Paternal Grandmother     • Breast cancer Other       Social History     Tobacco Use   • Smoking status: Never   • Smokeless tobacco: Never   Vaping Use   • Vaping status: Never Used   Substance Use Topics   • Alcohol use: Not Currently     Alcohol/week: 1.0 - 2.0 standard drink of alcohol     Types: 1 - 2 Glasses of wine per week   • Drug use: Not Currently     Types: Marijuana       Physical Exam   ED Triage Vitals [10/22/24 1938]   Temperature Heart Rate Resp BP   36.4 °C (97.5 °F) 88 -- 138/86      Pulse Ox Temp Source Heart Rate Source Patient Position   99 % Temporal Monitor Sitting      BP Location FiO2 (%)     Left arm --       Physical Exam  Vitals and nursing note reviewed.   Constitutional:       General: She is not in acute distress.     Appearance: Normal appearance. She is normal weight. She is not ill-appearing or toxic-appearing.   HENT:      Head: Normocephalic and atraumatic.      Right Ear: Tympanic membrane, ear canal and external ear normal. There is no impacted cerumen.      Left Ear: Tympanic membrane, ear canal and external ear normal. There is no impacted cerumen.      Nose: No rhinorrhea (No obvious rhinorrhea.).      Mouth/Throat:      Mouth: Mucous membranes are moist.      Pharynx: Oropharynx is clear. No oropharyngeal exudate or posterior oropharyngeal erythema.   Cardiovascular:      Rate and Rhythm: Normal rate and regular rhythm.      Heart sounds: Normal heart sounds. No murmur heard.     No friction rub. No gallop.   Pulmonary:      Effort: Pulmonary effort is normal. No respiratory distress.      Breath sounds: Normal breath sounds. No stridor. No wheezing, rhonchi or rales.   Neurological:      Mental Status: She is alert.           ED Course & MDM   Diagnoses as of 10/23/24 0137   Nausea                  No data recorded     [place] Coma Scale Score: 15 (10/22/24 1938 : Carly Quiñones RN)                           Medical Decision Making  21-year-old female presents to emergency department for chief complaint of nausea vomiting as well as flulike symptoms that been ongoing for over a month now.  Patient is currently 10 weeks pregnant with confirmed IUP.    1:35 AM upon reevaluation patient states that she has had resolved nausea.  Patient does complain of some continued nasal congestion at this time.  Patient did ask me what they can use for this and advised they can use Ocean Spray to help with congestion.  Educated patient that they are likely experiencing URI at this time.        Procedure  Procedures     Irvin Murry PA-C  10/23/24 0138

## 2024-10-23 NOTE — PROGRESS NOTES
10/24/2024   Shefali Arango     [facility] CONSULT NOTE  Referring Clinician: Trini Monroy CNM  Reason for consult: cHTN, history of PE    HPI: Shefali Arango is a 29 y.o.  at 10w0d here for consult for cHTN, history of PTD, and history of PE.     Reports feeling SOB. Has been having persistent URI symptoms including congestion and cough for 2 months. States that she has been tested for covid and flu multiple times and has been negative. CXR negative for acute pathology 10/9. Denies any sick contacts. Has history of seasonal allergies. Reports having intermittent wheezing of which she has been her albuterol inhaler multiple times per day with moderate relief. Also having nausea, improved with Zofran. Has heart burn. Denies any cramping or bleeding.    Denies cramping or bleeding.    10 point review of system is negative except as above    Pregnancy notable for   - BMI 50   - h/o PE in postpartum period  - cHTN    OB History          6    Para   4    Term   2       2    AB   1    Living   4         SAB        IAB   1    Ectopic        Multiple        Live Births   4                 Past medical history: Denies HTN, DM, asthma, depression, or thyroid issues    Past Surgical History:   Procedure Laterality Date    CHOLECYSTECTOMY      GALLBLADDER SURGERY  03/15/2018    Gallbladder Surgery       Medications:   Current Outpatient Medications   Medication Instructions    acetaminophen (TYLENOL) 650 mg, oral, Every 6 hours PRN    albuterol 180 mcg, Every 6 hours PRN    alcohol swabs pads, medicated Use 1, up to 5 times a day    aspirin 162 mg, oral, Daily    Classic Prenatal 28 mg iron- 800 mcg tablet tablet     enoxaparin (LOVENOX) 60 mg, subcutaneous, Every 12 hours    famotidine (PEPCID) 20 mg, oral, 2 times daily    fluticasone (Flonase) 50 mcg/actuation nasal spray 1 spray, Each Nostril, Daily, Shake gently. Before first use, prime pump. After use, clean tip and replace cap.    fluticasone  (Flovent HFA) 110 mcg/actuation inhaler 1 puff, inhalation, 2 times daily RT, Rinse mouth with water after use to reduce aftertaste and incidence of candidiasis. Do not swallow.    guaiFENesin (MUCINEX) 1,200 mg, oral, 2 times daily, Do not crush, chew, or split.    ondansetron (ZOFRAN) 4 mg, oral, Every 6 hours    ondansetron ODT (ZOFRAN-ODT) 4 mg, oral, Every 8 hours PRN    ondansetron ODT (ZOFRAN-ODT) 4 mg, oral, Every 8 hours PRN    peak flow meter device 1 kit, miscellaneous, Daily    prenatal vit 14-iron fum-folic 29 mg iron- 1 mg tablet,chewable 1 Units, oral, Daily    sodium chloride (Ocean) 0.65 % nasal spray 1 spray, Each Nostril, As needed         Allergies   Allergen Reactions    Ketorolac Other     Other reaction(s): Intolerance    Prochlorperazine Other     Gets restless/anxious    Tramadol Other     Makes her feel like shes having an anxiety attack       Social History     Tobacco Use    Smoking status: Never    Smokeless tobacco: Never   Vaping Use    Vaping status: Never Used   Substance Use Topics    Alcohol use: Not Currently     Alcohol/week: 1.0 - 2.0 standard drink of alcohol     Types: 1 - 2 Glasses of wine per week    Drug use: Not Currently     Types: Marijuana       family history includes Breast cancer in an other family member; COPD in her maternal grandfather and mother; Colon cancer in her maternal grandmother; Coronary artery disease in her mother; Deep vein thrombosis in her mother; Diabetes in her paternal grandmother; Heart failure in her maternal grandfather and mother; Hypertension in her maternal grandfather and mother; Pulmonary embolism in her mother; Seizures in her mother; Thyroid disease in her mother.      OBJECTIVE  Visit Vitals  BP 98/54   Wt 130 kg (286 lb 14.4 oz)   LMP 08/14/2024 (Approximate)   BMI 50.82 kg/m²   OB Status Pregnant   Smoking Status Never   BSA 2.4 m²       Physical exam  General: no acute distress  HEENT: normocephalic, atraumatic  Heart: warm and well  perfused  Lungs: breathing comfortably on room air  Abdomen: gravid  Extremities: moving all extremities  Neuro: awake and conversant  Psych: appropriate mood and affect    ASSESSMENT & PLAN    Shefali Arango is a 29 y.o.  at 10w0d here for the following:    Assessment & Plan  History of pulmonary embolus during pregnancy  - Patients who have their primary VTE in the setting of a recurrent risk factor (eg pregnancy or estrogen containing contraception) have a risk of recurrence of VTE in pregnancy of 4.5% without anticoagulation. Those with a non-recurrent risk factor (surgery, immobilization, infection) have a recurrence risk of 2.7%. Inherited thrombophilias increase the risk to varying degrees depending on the thrombophilia  - Full thrombophilia work-up including Factor V Leiden, Prothrombin gene mutation, anti-thrombin III, Protein C and S, and APLS work-up. The patient has previously had negative Factor V Leiden, Prothrombin gene mutation, APLS work-up, protein C/S and anti-thrombin. No identified inherited or acquired thrombophilia  - Given VTE associated with pregnancy, intermediate dose anticoagulation with Lovenox 0.5mg/kg BID is recommended (Lovenox 60mg BID)  - Recommend NLQ060 starting in the first trimester in these cases with increased pregnancy surveillance.   10 weeks gestation of pregnancy ([facility])  Will discuss flu vaccine at next visit  Start bASA    Morbid obesity (Multi)  BMI 50  Today we discussed the pregnancy implications including increased risk of pre-eclampsia, gestational diabetes,  delivery, LGA/macrosomia, growth restriction, stillbirth, shoulder dystocia, venous thromboembolic disease and congenital anomalies.  Unclear what is optimal weight gain in pregnancy in women with obesity, though weight loss is discouraged during pregnancy.  We reviewed the [place] of [employer] recommendations for gestational weight gain obese women, though subsequent large studies have  suggestive that lower weight gain may be associated with lower rates of maternal and fetal morbidity and that we recommend that she limit her weight gain to no more than 11-20lbs during pregnancy and that weight gain less than 11-20 lbs is also acceptable assuming normal fetal growth.  To address some of these concerns we discussed increased pregnancy surveillance to include an early screen for diabetes, detailed anatomic evaluation of the fetus and serial growth ultrasound to assess fetal weight.  We discussed the role of a healthy diet with consideration for referral to a nutritionist as well as exercise.   Asthma affecting pregnancy in first trimester ([facility])  Patient reports intermittent nightly cough and wheezing of which she is using albuterol in haler daily.  Reviewed the implications of asthma in pregnancy.  Reviewed that asthma can have a variable course in pregnancy with some individuals noting improvement whereas others ma have worsening symptoms. discussed that the greatest predictor of course does appear to be severity of symptom and control preconception. discussed that poorly controlled asthma does appear to be associated with heightened risks of FGR, hypertensive disease and  morbidity. Reviewed that the majority of medications sued to treat asthma have a favorable pregnancy profile and that ultimately I would support whatever regimen allows for optimal control.    Will start Flovent  Peak flow meter sent to pharmacy  Will also start on daily Zyrtec  Discussed that if symptoms not improved, low threshold for repeat CTPE given history of PE in the past, though symptoms at this time seem most consistent with asthma   Myriad screening pending  Gastroesophageal reflux disease, unspecified whether esophagitis present  Started on Pepcid BID  Pre-existing essential hypertension during pregnancy in first trimester ([facility])  No medications currently  She has been counseled on the implications of  cHTN during pregnancy including the increased risk of  delivery, placental abruption, pre-eclampsia/HELLP, FGR,  NICU admission and stillbirth.  We discussed that antihypertensive medications cross the placenta but are generally considered safe with first line medications being labetalol and nifedipine due to their efficacy and safety data.  Generally, blood pressure targets are <140 systolic and <90 diastolic.   We discussed that due to the physiologic decrease in systemic vascular resistance during pregnancy, blood pressure often is lower in the first and second trimesters before returning to their baseline values in the third trimester and that blood pressure medications often need to be titrated as gestation advanced.  However careful attention should be paid to the potential development of pre-eclampsia if increased doses are required.  We discussed that we recommend obtaining baseline pre-eclampsia labs as well as serial growth ultrasounds to screen for FGR.  If oral antihypertensive is required we recommend  testing with twice weekly NSTs to start at 32 weeks.  We recommend delivery at 38-39 weeks for patients with cHTN that is well controlled without medication, 37-38 in patients well controlled on medication and delivery at 37 weeks in patients that are poorly controlled.    Cough, unspecified type  Prescribed Mucinex today given URI symptoms    Will follow-up in 1 weeks to follow-up asthma symptoms  Seen and discussed with Dr. Angela Torres MD PGY-3      I saw and evaluated the patient. I personally obtained the key and critical portions of the history and physical exam or was physically present for key and critical portions performed by the resident/fellow. I reviewed the resident/fellow's documentation and discussed the patient with the resident/fellow. I agree with the resident/fellow's medical decision making as documented in the note.    Patient here for consult  in the setting of PE in postpartum period with negative thrombophilia testing, cHTN, and obesity.  Will initiate intermediate dose Lovenox for DVT/PE prevention.  Baseline HELLP labs, initiation of aspirin ppx for cHTN history. Given worsening asthma symptoms will add zyrtec, flovent and reassess in 1 week.  Low threshold for CTPE given history and persistent dyspnea, but symptoms today are more consistent with asthma flare.  We will continue to follow patient at this time.,    Charity Miller MD   M Attending         Charity Miller MD

## 2024-10-23 NOTE — DISCHARGE INSTRUCTIONS
To the emergency department for any new or worsening symptoms.  You can take Zofran as needed for nausea.  You can use Ocean nasal spray as well.

## 2024-10-24 ENCOUNTER — HOSPITAL ENCOUNTER (OUTPATIENT)
Dept: RADIOLOGY | Facility: CLINIC | Age: 29
Discharge: HOME | End: 2024-10-24
Payer: COMMERCIAL

## 2024-10-24 ENCOUNTER — INITIAL PRENATAL (OUTPATIENT)
Dept: MATERNAL FETAL MEDICINE | Facility: CLINIC | Age: 29
End: 2024-10-24
Payer: COMMERCIAL

## 2024-10-24 ENCOUNTER — PHARMACY VISIT (OUTPATIENT)
Dept: PHARMACY | Facility: CLINIC | Age: 29
End: 2024-10-24
Payer: MEDICAID

## 2024-10-24 VITALS — SYSTOLIC BLOOD PRESSURE: 98 MMHG | BODY MASS INDEX: 50.82 KG/M2 | DIASTOLIC BLOOD PRESSURE: 54 MMHG | WEIGHT: 286.9 LBS

## 2024-10-24 DIAGNOSIS — J45.909 ASTHMA AFFECTING PREGNANCY IN FIRST TRIMESTER (HHS-HCC): Primary | ICD-10-CM

## 2024-10-24 DIAGNOSIS — R05.9 COUGH, UNSPECIFIED TYPE: ICD-10-CM

## 2024-10-24 DIAGNOSIS — E66.01 MORBID OBESITY (MULTI): ICD-10-CM

## 2024-10-24 DIAGNOSIS — Z3A.10 10 WEEKS GESTATION OF PREGNANCY (HHS-HCC): ICD-10-CM

## 2024-10-24 DIAGNOSIS — Z86.711 HISTORY OF PULMONARY EMBOLUS DURING PREGNANCY: ICD-10-CM

## 2024-10-24 DIAGNOSIS — K21.9 GASTROESOPHAGEAL REFLUX DISEASE, UNSPECIFIED WHETHER ESOPHAGITIS PRESENT: ICD-10-CM

## 2024-10-24 DIAGNOSIS — O10.011 PRE-EXISTING ESSENTIAL HYPERTENSION DURING PREGNANCY IN FIRST TRIMESTER (HHS-HCC): ICD-10-CM

## 2024-10-24 DIAGNOSIS — Z34.81 PRENATAL CARE, SUBSEQUENT PREGNANCY IN FIRST TRIMESTER (HHS-HCC): ICD-10-CM

## 2024-10-24 DIAGNOSIS — Z86.711 HISTORY OF PULMONARY EMBOLISM: ICD-10-CM

## 2024-10-24 DIAGNOSIS — Z34.91 FIRST TRIMESTER PREGNANCY (HHS-HCC): ICD-10-CM

## 2024-10-24 DIAGNOSIS — Z87.59 HISTORY OF PULMONARY EMBOLUS DURING PREGNANCY: ICD-10-CM

## 2024-10-24 DIAGNOSIS — O99.511 ASTHMA AFFECTING PREGNANCY IN FIRST TRIMESTER (HHS-HCC): Primary | ICD-10-CM

## 2024-10-24 PROCEDURE — 99214 OFFICE O/P EST MOD 30 MIN: CPT | Mod: GC | Performed by: STUDENT IN AN ORGANIZED HEALTH CARE EDUCATION/TRAINING PROGRAM

## 2024-10-24 PROCEDURE — RXMED WILLOW AMBULATORY MEDICATION CHARGE

## 2024-10-24 PROCEDURE — 99244 OFF/OP CNSLTJ NEW/EST MOD 40: CPT | Performed by: STUDENT IN AN ORGANIZED HEALTH CARE EDUCATION/TRAINING PROGRAM

## 2024-10-24 RX ORDER — FLUTICASONE PROPIONATE 110 UG/1
1 AEROSOL, METERED RESPIRATORY (INHALATION)
Qty: 12 G | Refills: 11 | Status: SHIPPED | OUTPATIENT
Start: 2024-10-24 | End: 2025-10-24

## 2024-10-24 RX ORDER — ISOPROPYL ALCOHOL 70 ML/100ML
SWAB TOPICAL
Qty: 200 EACH | Refills: 3 | Status: SHIPPED | OUTPATIENT
Start: 2024-10-24

## 2024-10-24 RX ORDER — FAMOTIDINE 20 MG/1
20 TABLET, FILM COATED ORAL 2 TIMES DAILY
Qty: 90 TABLET | Refills: 3 | Status: SHIPPED | OUTPATIENT
Start: 2024-10-24 | End: 2025-10-24

## 2024-10-24 RX ORDER — ENOXAPARIN SODIUM 100 MG/ML
60 INJECTION SUBCUTANEOUS EVERY 12 HOURS
Qty: 15 ML | Refills: 2 | Status: SHIPPED | OUTPATIENT
Start: 2024-10-24 | End: 2024-10-24

## 2024-10-24 RX ORDER — ASPIRIN 81 MG/1
162 TABLET ORAL DAILY
Qty: 60 TABLET | Refills: 3 | Status: SHIPPED | OUTPATIENT
Start: 2024-10-24 | End: 2025-10-24

## 2024-10-24 RX ORDER — GUAIFENESIN 600 MG/1
1200 TABLET, EXTENDED RELEASE ORAL 2 TIMES DAILY
Qty: 120 TABLET | Refills: 11 | Status: SHIPPED | OUTPATIENT
Start: 2024-10-24 | End: 2025-10-24

## 2024-10-24 RX ORDER — ENOXAPARIN SODIUM 100 MG/ML
60 INJECTION SUBCUTANEOUS EVERY 12 HOURS
Qty: 36 ML | Refills: 2 | Status: SHIPPED | OUTPATIENT
Start: 2024-10-24

## 2024-10-24 RX ORDER — GUAIFENESIN 600 MG/1
1200 TABLET, EXTENDED RELEASE ORAL 2 TIMES DAILY
Qty: 120 TABLET | Refills: 11 | Status: SHIPPED | OUTPATIENT
Start: 2024-10-24 | End: 2024-10-24

## 2024-10-24 RX ORDER — ASPIRIN 81 MG/1
162 TABLET ORAL DAILY
Qty: 60 TABLET | Refills: 3 | Status: SHIPPED | OUTPATIENT
Start: 2024-10-24 | End: 2024-10-24

## 2024-10-24 RX ORDER — FLUTICASONE PROPIONATE 110 UG/1
1 AEROSOL, METERED RESPIRATORY (INHALATION)
Qty: 12 G | Refills: 11 | Status: SHIPPED | OUTPATIENT
Start: 2024-10-24 | End: 2024-10-24 | Stop reason: ALTCHOICE

## 2024-10-24 RX ORDER — FAMOTIDINE 20 MG/1
20 TABLET, FILM COATED ORAL 2 TIMES DAILY
Qty: 90 TABLET | Refills: 3 | Status: SHIPPED | OUTPATIENT
Start: 2024-10-24 | End: 2024-10-24

## 2024-10-24 ASSESSMENT — ENCOUNTER SYMPTOMS
CONSTITUTIONAL NEGATIVE: 0
GASTROINTESTINAL NEGATIVE: 0
PSYCHIATRIC NEGATIVE: 0
HEMATOLOGIC/LYMPHATIC NEGATIVE: 0
EYES NEGATIVE: 0
MUSCULOSKELETAL NEGATIVE: 0
RESPIRATORY NEGATIVE: 0
NEUROLOGICAL NEGATIVE: 0
ENDOCRINE NEGATIVE: 0
CARDIOVASCULAR NEGATIVE: 0
ALLERGIC/IMMUNOLOGIC NEGATIVE: 0

## 2024-10-24 ASSESSMENT — COLUMBIA-SUICIDE SEVERITY RATING SCALE - C-SSRS
6. HAVE YOU EVER DONE ANYTHING, STARTED TO DO ANYTHING, OR PREPARED TO DO ANYTHING TO END YOUR LIFE?: NO
1. IN THE PAST MONTH, HAVE YOU WISHED YOU WERE DEAD OR WISHED YOU COULD GO TO SLEEP AND NOT WAKE UP?: NO
2. HAVE YOU ACTUALLY HAD ANY THOUGHTS OF KILLING YOURSELF?: NO

## 2024-10-24 NOTE — ASSESSMENT & PLAN NOTE
BMI 50  Today we discussed the pregnancy implications including increased risk of pre-eclampsia, gestational diabetes,  delivery, LGA/macrosomia, growth restriction, stillbirth, shoulder dystocia, venous thromboembolic disease and congenital anomalies.  Unclear what is optimal weight gain in pregnancy in women with obesity, though weight loss is discouraged during pregnancy.  We reviewed the [place] of Medicine recommendations for gestational weight gain obese women, though subsequent large studies have suggestive that lower weight gain may be associated with lower rates of maternal and fetal morbidity and that we recommend that she limit her weight gain to no more than 11-20lbs during pregnancy and that weight gain less than 11-20 lbs is also acceptable assuming normal fetal growth.  To address some of these concerns we discussed increased pregnancy surveillance to include an early screen for diabetes, detailed anatomic evaluation of the fetus and serial growth ultrasound to assess fetal weight.  We discussed the role of a healthy diet with consideration for referral to a nutritionist as well as exercise.

## 2024-10-30 PROBLEM — Z3A.11 11 WEEKS GESTATION OF PREGNANCY (HHS-HCC): Status: ACTIVE | Noted: 2024-10-14

## 2024-10-30 PROBLEM — Z86.718 HISTORY OF DVT (DEEP VEIN THROMBOSIS): Status: RESOLVED | Noted: 2024-10-14 | Resolved: 2024-10-30

## 2024-10-31 ENCOUNTER — HOSPITAL ENCOUNTER (OUTPATIENT)
Dept: RADIOLOGY | Facility: CLINIC | Age: 29
Discharge: HOME | End: 2024-10-31
Payer: COMMERCIAL

## 2024-10-31 ENCOUNTER — HOSPITAL ENCOUNTER (EMERGENCY)
Facility: HOSPITAL | Age: 29
Discharge: HOME | End: 2024-11-01
Attending: EMERGENCY MEDICINE
Payer: COMMERCIAL

## 2024-10-31 ENCOUNTER — ROUTINE PRENATAL (OUTPATIENT)
Dept: MATERNAL FETAL MEDICINE | Facility: CLINIC | Age: 29
End: 2024-10-31
Payer: COMMERCIAL

## 2024-10-31 VITALS — BODY MASS INDEX: 50.95 KG/M2 | DIASTOLIC BLOOD PRESSURE: 85 MMHG | SYSTOLIC BLOOD PRESSURE: 121 MMHG | WEIGHT: 287.6 LBS

## 2024-10-31 DIAGNOSIS — O10.011 PRE-EXISTING ESSENTIAL HYPERTENSION DURING PREGNANCY IN FIRST TRIMESTER (HHS-HCC): ICD-10-CM

## 2024-10-31 DIAGNOSIS — O99.519 MATERNAL ASTHMA COMPLICATING PREGNANCY (HHS-HCC): ICD-10-CM

## 2024-10-31 DIAGNOSIS — O36.80X0 PREGNANCY OF UNKNOWN ANATOMIC LOCATION (HHS-HCC): ICD-10-CM

## 2024-10-31 DIAGNOSIS — O03.9 MISCARRIAGE (HHS-HCC): Primary | ICD-10-CM

## 2024-10-31 DIAGNOSIS — O02.1 IUFD AT LESS THAN 20 WEEKS OF GESTATION (HHS-HCC): Primary | ICD-10-CM

## 2024-10-31 DIAGNOSIS — Z87.59 HISTORY OF PULMONARY EMBOLUS DURING PREGNANCY: ICD-10-CM

## 2024-10-31 DIAGNOSIS — O02.1 MISSED ABORTION (HHS-HCC): Primary | ICD-10-CM

## 2024-10-31 DIAGNOSIS — K21.9 GASTROESOPHAGEAL REFLUX DISEASE, UNSPECIFIED WHETHER ESOPHAGITIS PRESENT: ICD-10-CM

## 2024-10-31 DIAGNOSIS — Z86.711 HISTORY OF PULMONARY EMBOLUS DURING PREGNANCY: ICD-10-CM

## 2024-10-31 DIAGNOSIS — Z3A.11 11 WEEKS GESTATION OF PREGNANCY (HHS-HCC): ICD-10-CM

## 2024-10-31 DIAGNOSIS — J45.909 MATERNAL ASTHMA COMPLICATING PREGNANCY (HHS-HCC): ICD-10-CM

## 2024-10-31 PROCEDURE — 76801 OB US < 14 WKS SINGLE FETUS: CPT

## 2024-10-31 PROCEDURE — 76801 OB US < 14 WKS SINGLE FETUS: CPT | Performed by: STUDENT IN AN ORGANIZED HEALTH CARE EDUCATION/TRAINING PROGRAM

## 2024-10-31 PROCEDURE — 99284 EMERGENCY DEPT VISIT MOD MDM: CPT

## 2024-10-31 PROCEDURE — 99214 OFFICE O/P EST MOD 30 MIN: CPT | Mod: GC,25 | Performed by: STUDENT IN AN ORGANIZED HEALTH CARE EDUCATION/TRAINING PROGRAM

## 2024-10-31 PROCEDURE — 99214 OFFICE O/P EST MOD 30 MIN: CPT | Performed by: STUDENT IN AN ORGANIZED HEALTH CARE EDUCATION/TRAINING PROGRAM

## 2024-10-31 ASSESSMENT — COLUMBIA-SUICIDE SEVERITY RATING SCALE - C-SSRS
2. HAVE YOU ACTUALLY HAD ANY THOUGHTS OF KILLING YOURSELF?: NO
6. HAVE YOU EVER DONE ANYTHING, STARTED TO DO ANYTHING, OR PREPARED TO DO ANYTHING TO END YOUR LIFE?: NO
2. HAVE YOU ACTUALLY HAD ANY THOUGHTS OF KILLING YOURSELF?: NO
1. IN THE PAST MONTH, HAVE YOU WISHED YOU WERE DEAD OR WISHED YOU COULD GO TO SLEEP AND NOT WAKE UP?: NO
6. HAVE YOU EVER DONE ANYTHING, STARTED TO DO ANYTHING, OR PREPARED TO DO ANYTHING TO END YOUR LIFE?: NO
1. IN THE PAST MONTH, HAVE YOU WISHED YOU WERE DEAD OR WISHED YOU COULD GO TO SLEEP AND NOT WAKE UP?: NO

## 2024-10-31 ASSESSMENT — ENCOUNTER SYMPTOMS
RESPIRATORY NEGATIVE: 0
GASTROINTESTINAL NEGATIVE: 0
ALLERGIC/IMMUNOLOGIC NEGATIVE: 0
PSYCHIATRIC NEGATIVE: 0
EYES NEGATIVE: 0
MUSCULOSKELETAL NEGATIVE: 0
NEUROLOGICAL NEGATIVE: 0
HEMATOLOGIC/LYMPHATIC NEGATIVE: 0
CARDIOVASCULAR NEGATIVE: 0
CONSTITUTIONAL NEGATIVE: 0
ENDOCRINE NEGATIVE: 0

## 2024-10-31 ASSESSMENT — PATIENT HEALTH QUESTIONNAIRE - PHQ9
SUM OF ALL RESPONSES TO PHQ9 QUESTIONS 1 AND 2: 0
2. FEELING DOWN, DEPRESSED OR HOPELESS: NOT AT ALL
1. LITTLE INTEREST OR PLEASURE IN DOING THINGS: NOT AT ALL

## 2024-10-31 ASSESSMENT — PAIN - FUNCTIONAL ASSESSMENT: PAIN_FUNCTIONAL_ASSESSMENT: 0-10

## 2024-10-31 ASSESSMENT — PAIN SCALES - GENERAL: PAINLEVEL_OUTOF10: 8

## 2024-10-31 ASSESSMENT — PAIN DESCRIPTION - PAIN TYPE: TYPE: ACUTE PAIN

## 2024-10-31 ASSESSMENT — PAIN DESCRIPTION - LOCATION: LOCATION: ABDOMEN

## 2024-11-01 ENCOUNTER — ANESTHESIA EVENT (OUTPATIENT)
Dept: OPERATING ROOM | Facility: HOSPITAL | Age: 29
End: 2024-11-01
Payer: COMMERCIAL

## 2024-11-01 ENCOUNTER — SURGERY (OUTPATIENT)
Age: 29
End: 2024-11-01
Payer: COMMERCIAL

## 2024-11-01 ENCOUNTER — HOSPITAL ENCOUNTER (OUTPATIENT)
Facility: HOSPITAL | Age: 29
Setting detail: OUTPATIENT SURGERY
Discharge: HOME | End: 2024-11-01
Attending: OBSTETRICS & GYNECOLOGY | Admitting: OBSTETRICS & GYNECOLOGY
Payer: COMMERCIAL

## 2024-11-01 ENCOUNTER — ANESTHESIA (OUTPATIENT)
Dept: OPERATING ROOM | Facility: HOSPITAL | Age: 29
End: 2024-11-01
Payer: COMMERCIAL

## 2024-11-01 VITALS
WEIGHT: 284 LBS | HEIGHT: 63 IN | TEMPERATURE: 97 F | BODY MASS INDEX: 50.32 KG/M2 | SYSTOLIC BLOOD PRESSURE: 122 MMHG | DIASTOLIC BLOOD PRESSURE: 78 MMHG | RESPIRATION RATE: 18 BRPM | OXYGEN SATURATION: 99 % | HEART RATE: 96 BPM

## 2024-11-01 VITALS
HEART RATE: 95 BPM | BODY MASS INDEX: 50.38 KG/M2 | DIASTOLIC BLOOD PRESSURE: 73 MMHG | WEIGHT: 284.39 LBS | RESPIRATION RATE: 23 BRPM | OXYGEN SATURATION: 100 % | SYSTOLIC BLOOD PRESSURE: 135 MMHG | TEMPERATURE: 97.2 F

## 2024-11-01 DIAGNOSIS — O02.1 MISSED ABORTION (HHS-HCC): ICD-10-CM

## 2024-11-01 DIAGNOSIS — Z86.711 HISTORY OF PULMONARY EMBOLUS DURING PREGNANCY: Primary | ICD-10-CM

## 2024-11-01 DIAGNOSIS — G89.18 POST-OPERATIVE PAIN: ICD-10-CM

## 2024-11-01 DIAGNOSIS — Z87.59 HISTORY OF PULMONARY EMBOLUS DURING PREGNANCY: Primary | ICD-10-CM

## 2024-11-01 LAB
ABO GROUP (TYPE) IN BLOOD: NORMAL
ANTIBODY SCREEN: NORMAL
RH FACTOR (ANTIGEN D): NORMAL

## 2024-11-01 PROCEDURE — 2500000004 HC RX 250 GENERAL PHARMACY W/ HCPCS (ALT 636 FOR OP/ED): Mod: SE | Performed by: OBSTETRICS & GYNECOLOGY

## 2024-11-01 PROCEDURE — 7100000001 HC RECOVERY ROOM TIME - INITIAL BASE CHARGE: Performed by: OBSTETRICS & GYNECOLOGY

## 2024-11-01 PROCEDURE — 3600000008 HC OR TIME - EACH INCREMENTAL 1 MINUTE - PROCEDURE LEVEL THREE: Performed by: OBSTETRICS & GYNECOLOGY

## 2024-11-01 PROCEDURE — 88305 TISSUE EXAM BY PATHOLOGIST: CPT | Performed by: PATHOLOGY

## 2024-11-01 PROCEDURE — 88305 TISSUE EXAM BY PATHOLOGIST: CPT | Mod: TC,SUR

## 2024-11-01 PROCEDURE — 36415 COLL VENOUS BLD VENIPUNCTURE: CPT | Performed by: ANESTHESIOLOGY

## 2024-11-01 PROCEDURE — 3700000001 HC GENERAL ANESTHESIA TIME - INITIAL BASE CHARGE: Performed by: OBSTETRICS & GYNECOLOGY

## 2024-11-01 PROCEDURE — 2500000004 HC RX 250 GENERAL PHARMACY W/ HCPCS (ALT 636 FOR OP/ED): Mod: SE | Performed by: STUDENT IN AN ORGANIZED HEALTH CARE EDUCATION/TRAINING PROGRAM

## 2024-11-01 PROCEDURE — 86850 RBC ANTIBODY SCREEN: CPT | Performed by: ANESTHESIOLOGY

## 2024-11-01 PROCEDURE — 96372 THER/PROPH/DIAG INJ SC/IM: CPT | Performed by: STUDENT IN AN ORGANIZED HEALTH CARE EDUCATION/TRAINING PROGRAM

## 2024-11-01 PROCEDURE — 7100000002 HC RECOVERY ROOM TIME - EACH INCREMENTAL 1 MINUTE: Performed by: OBSTETRICS & GYNECOLOGY

## 2024-11-01 PROCEDURE — 2500000004 HC RX 250 GENERAL PHARMACY W/ HCPCS (ALT 636 FOR OP/ED): Mod: SE

## 2024-11-01 PROCEDURE — 3700000002 HC GENERAL ANESTHESIA TIME - EACH INCREMENTAL 1 MINUTE: Performed by: OBSTETRICS & GYNECOLOGY

## 2024-11-01 PROCEDURE — 2500000004 HC RX 250 GENERAL PHARMACY W/ HCPCS (ALT 636 FOR OP/ED): Mod: SE | Performed by: ANESTHESIOLOGY

## 2024-11-01 PROCEDURE — 3600000003 HC OR TIME - INITIAL BASE CHARGE - PROCEDURE LEVEL THREE: Performed by: OBSTETRICS & GYNECOLOGY

## 2024-11-01 DEVICE — IMPLANTABLE DEVICE: Type: IMPLANTABLE DEVICE | Site: UTERUS | Status: FUNCTIONAL

## 2024-11-01 RX ORDER — ONDANSETRON HYDROCHLORIDE 2 MG/ML
4 INJECTION, SOLUTION INTRAVENOUS ONCE AS NEEDED
Status: COMPLETED | OUTPATIENT
Start: 2024-11-01 | End: 2024-11-01

## 2024-11-01 RX ORDER — LIDOCAINE HYDROCHLORIDE 10 MG/ML
0.1 INJECTION, SOLUTION EPIDURAL; INFILTRATION; INTRACAUDAL; PERINEURAL ONCE
Status: DISCONTINUED | OUTPATIENT
Start: 2024-11-01 | End: 2024-11-01 | Stop reason: HOSPADM

## 2024-11-01 RX ORDER — METHOCARBAMOL 100 MG/ML
1000 INJECTION, SOLUTION INTRAMUSCULAR; INTRAVENOUS ONCE
Status: DISCONTINUED | OUTPATIENT
Start: 2024-11-01 | End: 2024-11-01 | Stop reason: HOSPADM

## 2024-11-01 RX ORDER — ALBUTEROL SULFATE 0.83 MG/ML
2.5 SOLUTION RESPIRATORY (INHALATION) ONCE AS NEEDED
Status: DISCONTINUED | OUTPATIENT
Start: 2024-11-01 | End: 2024-11-01 | Stop reason: HOSPADM

## 2024-11-01 RX ORDER — HYDRALAZINE HYDROCHLORIDE 20 MG/ML
5 INJECTION INTRAMUSCULAR; INTRAVENOUS EVERY 30 MIN PRN
Status: DISCONTINUED | OUTPATIENT
Start: 2024-11-01 | End: 2024-11-01 | Stop reason: HOSPADM

## 2024-11-01 RX ORDER — ACETAMINOPHEN 325 MG/1
650 TABLET ORAL EVERY 4 HOURS PRN
Status: DISCONTINUED | OUTPATIENT
Start: 2024-11-01 | End: 2024-11-01 | Stop reason: HOSPADM

## 2024-11-01 RX ORDER — OXYCODONE HYDROCHLORIDE 5 MG/1
5 TABLET ORAL EVERY 4 HOURS PRN
Status: CANCELLED | OUTPATIENT
Start: 2024-11-01

## 2024-11-01 RX ORDER — MIDAZOLAM HYDROCHLORIDE 1 MG/ML
INJECTION INTRAMUSCULAR; INTRAVENOUS AS NEEDED
Status: DISCONTINUED | OUTPATIENT
Start: 2024-11-01 | End: 2024-11-01

## 2024-11-01 RX ORDER — SODIUM CHLORIDE, SODIUM LACTATE, POTASSIUM CHLORIDE, CALCIUM CHLORIDE 600; 310; 30; 20 MG/100ML; MG/100ML; MG/100ML; MG/100ML
100 INJECTION, SOLUTION INTRAVENOUS CONTINUOUS
Status: ACTIVE | OUTPATIENT
Start: 2024-11-01 | End: 2024-11-01

## 2024-11-01 RX ORDER — ONDANSETRON HYDROCHLORIDE 2 MG/ML
INJECTION, SOLUTION INTRAVENOUS AS NEEDED
Status: DISCONTINUED | OUTPATIENT
Start: 2024-11-01 | End: 2024-11-01

## 2024-11-01 RX ORDER — KETOROLAC TROMETHAMINE 30 MG/ML
INJECTION, SOLUTION INTRAMUSCULAR; INTRAVENOUS AS NEEDED
Status: DISCONTINUED | OUTPATIENT
Start: 2024-11-01 | End: 2024-11-01

## 2024-11-01 RX ORDER — PROPOFOL 10 MG/ML
INJECTION, EMULSION INTRAVENOUS AS NEEDED
Status: DISCONTINUED | OUTPATIENT
Start: 2024-11-01 | End: 2024-11-01

## 2024-11-01 RX ORDER — ACETAMINOPHEN 500 MG
1000 TABLET ORAL EVERY 6 HOURS PRN
Qty: 30 TABLET | Refills: 0 | Status: SHIPPED | OUTPATIENT
Start: 2024-11-01 | End: 2024-11-17 | Stop reason: WASHOUT

## 2024-11-01 RX ORDER — PHENYLEPHRINE HYDROCHLORIDE 10 MG/ML
INJECTION INTRAVENOUS AS NEEDED
Status: DISCONTINUED | OUTPATIENT
Start: 2024-11-01 | End: 2024-11-01

## 2024-11-01 RX ORDER — DIPHENHYDRAMINE HYDROCHLORIDE 50 MG/ML
12.5 INJECTION INTRAMUSCULAR; INTRAVENOUS ONCE AS NEEDED
Status: DISCONTINUED | OUTPATIENT
Start: 2024-11-01 | End: 2024-11-01 | Stop reason: HOSPADM

## 2024-11-01 RX ORDER — ENOXAPARIN SODIUM 100 MG/ML
40 INJECTION SUBCUTANEOUS DAILY
Qty: 50 EACH | Refills: 0 | Status: SHIPPED | OUTPATIENT
Start: 2024-11-01 | End: 2024-12-21

## 2024-11-01 RX ORDER — METHYLERGONOVINE MALEATE 0.2 MG/ML
INJECTION INTRAVENOUS AS NEEDED
Status: DISCONTINUED | OUTPATIENT
Start: 2024-11-01 | End: 2024-11-01

## 2024-11-01 RX ORDER — ACETAMINOPHEN 325 MG/1
975 TABLET ORAL ONCE
Status: DISCONTINUED | OUTPATIENT
Start: 2024-11-01 | End: 2024-11-01 | Stop reason: HOSPADM

## 2024-11-01 RX ORDER — DOXYCYCLINE 100 MG/10ML
INJECTION, POWDER, LYOPHILIZED, FOR SOLUTION INTRAVENOUS AS NEEDED
Status: DISCONTINUED | OUTPATIENT
Start: 2024-11-01 | End: 2024-11-01

## 2024-11-01 RX ORDER — METOCLOPRAMIDE HYDROCHLORIDE 5 MG/ML
10 INJECTION INTRAMUSCULAR; INTRAVENOUS ONCE AS NEEDED
Status: DISCONTINUED | OUTPATIENT
Start: 2024-11-01 | End: 2024-11-01 | Stop reason: HOSPADM

## 2024-11-01 RX ORDER — HYDROMORPHONE HYDROCHLORIDE 1 MG/ML
0.5 INJECTION, SOLUTION INTRAMUSCULAR; INTRAVENOUS; SUBCUTANEOUS EVERY 5 MIN PRN
Status: CANCELLED | OUTPATIENT
Start: 2024-11-01

## 2024-11-01 RX ORDER — FENTANYL CITRATE 50 UG/ML
INJECTION, SOLUTION INTRAMUSCULAR; INTRAVENOUS AS NEEDED
Status: DISCONTINUED | OUTPATIENT
Start: 2024-11-01 | End: 2024-11-01

## 2024-11-01 RX ORDER — HYDROMORPHONE HYDROCHLORIDE 0.2 MG/ML
0.2 INJECTION INTRAMUSCULAR; INTRAVENOUS; SUBCUTANEOUS EVERY 5 MIN PRN
Status: CANCELLED | OUTPATIENT
Start: 2024-11-01

## 2024-11-01 RX ORDER — LIDOCAINE HYDROCHLORIDE 20 MG/ML
INJECTION, SOLUTION INFILTRATION; PERINEURAL AS NEEDED
Status: DISCONTINUED | OUTPATIENT
Start: 2024-11-01 | End: 2024-11-01

## 2024-11-01 RX ORDER — LABETALOL HYDROCHLORIDE 5 MG/ML
5 INJECTION, SOLUTION INTRAVENOUS ONCE AS NEEDED
Status: DISCONTINUED | OUTPATIENT
Start: 2024-11-01 | End: 2024-11-01 | Stop reason: HOSPADM

## 2024-11-01 RX ORDER — OXYCODONE HYDROCHLORIDE 5 MG/1
10 TABLET ORAL EVERY 4 HOURS PRN
Status: CANCELLED | OUTPATIENT
Start: 2024-11-01

## 2024-11-01 SDOH — HEALTH STABILITY: MENTAL HEALTH: CURRENT SMOKER: 0

## 2024-11-04 ENCOUNTER — OFFICE VISIT (OUTPATIENT)
Dept: OBSTETRICS AND GYNECOLOGY | Facility: CLINIC | Age: 29
End: 2024-11-04
Payer: COMMERCIAL

## 2024-11-04 ENCOUNTER — LAB (OUTPATIENT)
Dept: LAB | Facility: LAB | Age: 29
End: 2024-11-04
Payer: COMMERCIAL

## 2024-11-04 ENCOUNTER — HOSPITAL ENCOUNTER (OUTPATIENT)
Dept: RADIOLOGY | Facility: CLINIC | Age: 29
Discharge: HOME | End: 2024-11-04
Payer: COMMERCIAL

## 2024-11-04 VITALS
HEART RATE: 96 BPM | WEIGHT: 288.44 LBS | SYSTOLIC BLOOD PRESSURE: 120 MMHG | BODY MASS INDEX: 51.09 KG/M2 | TEMPERATURE: 99.7 F | DIASTOLIC BLOOD PRESSURE: 85 MMHG

## 2024-11-04 DIAGNOSIS — R53.83 FATIGUE, UNSPECIFIED TYPE: ICD-10-CM

## 2024-11-04 DIAGNOSIS — O36.80X0 PREGNANCY OF UNKNOWN ANATOMIC LOCATION (HHS-HCC): ICD-10-CM

## 2024-11-04 DIAGNOSIS — Z86.711 HISTORY OF PULMONARY EMBOLUS DURING PREGNANCY: ICD-10-CM

## 2024-11-04 DIAGNOSIS — O02.1 MISSED ABORTION (HHS-HCC): Primary | ICD-10-CM

## 2024-11-04 DIAGNOSIS — Z87.59 HISTORY OF PULMONARY EMBOLUS DURING PREGNANCY: ICD-10-CM

## 2024-11-04 LAB
25(OH)D3 SERPL-MCNC: 18 NG/ML (ref 30–100)
BASOPHILS # BLD AUTO: 0.03 X10*3/UL (ref 0–0.1)
BASOPHILS NFR BLD AUTO: 0.4 %
EOSINOPHIL # BLD AUTO: 0.21 X10*3/UL (ref 0–0.7)
EOSINOPHIL NFR BLD AUTO: 2.5 %
ERYTHROCYTE [DISTWIDTH] IN BLOOD BY AUTOMATED COUNT: 13.2 % (ref 11.5–14.5)
HCT VFR BLD AUTO: 33.6 % (ref 36–46)
HGB BLD-MCNC: 10.8 G/DL (ref 12–16)
IMM GRANULOCYTES # BLD AUTO: 0.03 X10*3/UL (ref 0–0.7)
IMM GRANULOCYTES NFR BLD AUTO: 0.4 % (ref 0–0.9)
LYMPHOCYTES # BLD AUTO: 2.59 X10*3/UL (ref 1.2–4.8)
LYMPHOCYTES NFR BLD AUTO: 30.4 %
MCH RBC QN AUTO: 28.2 PG (ref 26–34)
MCHC RBC AUTO-ENTMCNC: 32.1 G/DL (ref 32–36)
MCV RBC AUTO: 88 FL (ref 80–100)
MONOCYTES # BLD AUTO: 0.57 X10*3/UL (ref 0.1–1)
MONOCYTES NFR BLD AUTO: 6.7 %
NEUTROPHILS # BLD AUTO: 5.1 X10*3/UL (ref 1.2–7.7)
NEUTROPHILS NFR BLD AUTO: 59.6 %
NRBC BLD-RTO: 0 /100 WBCS (ref 0–0)
PLATELET # BLD AUTO: 302 X10*3/UL (ref 150–450)
RBC # BLD AUTO: 3.83 X10*6/UL (ref 4–5.2)
TSH SERPL-ACNC: 1.4 MIU/L (ref 0.44–3.98)
WBC # BLD AUTO: 8.5 X10*3/UL (ref 4.4–11.3)

## 2024-11-04 PROCEDURE — 1036F TOBACCO NON-USER: CPT | Performed by: ADVANCED PRACTICE MIDWIFE

## 2024-11-04 PROCEDURE — 99024 POSTOP FOLLOW-UP VISIT: CPT | Performed by: ADVANCED PRACTICE MIDWIFE

## 2024-11-04 PROCEDURE — 82306 VITAMIN D 25 HYDROXY: CPT

## 2024-11-04 PROCEDURE — 84443 ASSAY THYROID STIM HORMONE: CPT

## 2024-11-04 PROCEDURE — 99213 OFFICE O/P EST LOW 20 MIN: CPT | Performed by: ADVANCED PRACTICE MIDWIFE

## 2024-11-04 PROCEDURE — 36415 COLL VENOUS BLD VENIPUNCTURE: CPT

## 2024-11-04 PROCEDURE — 85025 COMPLETE CBC W/AUTO DIFF WBC: CPT

## 2024-11-04 PROCEDURE — 3079F DIAST BP 80-89 MM HG: CPT | Performed by: ADVANCED PRACTICE MIDWIFE

## 2024-11-04 PROCEDURE — 3074F SYST BP LT 130 MM HG: CPT | Performed by: ADVANCED PRACTICE MIDWIFE

## 2024-11-04 ASSESSMENT — PAIN SCALES - GENERAL: PAINLEVEL_OUTOF10: 7
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
## 2024-11-05 ENCOUNTER — TELEPHONE (OUTPATIENT)
Dept: OBSTETRICS AND GYNECOLOGY | Facility: CLINIC | Age: 29
End: 2024-11-05
Payer: COMMERCIAL

## 2024-11-05 DIAGNOSIS — E55.9 VITAMIN D DEFICIENCY: ICD-10-CM

## 2024-11-05 DIAGNOSIS — D64.9 ANEMIA, UNSPECIFIED TYPE: Primary | ICD-10-CM

## 2024-11-05 RX ORDER — CHOLECALCIFEROL (VITAMIN D3) 50 MCG
50 TABLET ORAL DAILY
Qty: 90 TABLET | Refills: 3 | Status: SHIPPED | OUTPATIENT
Start: 2024-11-05 | End: 2025-11-05

## 2024-11-05 RX ORDER — FERROUS SULFATE 325(65) MG
325 TABLET ORAL
Qty: 30 TABLET | Refills: 11 | Status: SHIPPED | OUTPATIENT
Start: 2024-11-05 | End: 2025-11-05

## 2024-11-05 NOTE — TELEPHONE ENCOUNTER
----- Message from Nurse Annita MANUEL sent at 11/4/2024  3:32 PM EST -----  Regarding: test results  Contact: [phone]  Shefali Arango [birth year] [phone] calling to discuss test results- please call her    LVM to call office at [phone]

## 2024-11-06 LAB
LABORATORY COMMENT REPORT: NORMAL
PATH REPORT.COMMENTS IMP SPEC: NORMAL
PATH REPORT.FINAL DX SPEC: NORMAL
PATH REPORT.GROSS SPEC: NORMAL
PATH REPORT.RELEVANT HX SPEC: NORMAL
PATH REPORT.TOTAL CANCER: NORMAL

## 2024-11-11 ENCOUNTER — APPOINTMENT (OUTPATIENT)
Dept: OBSTETRICS AND GYNECOLOGY | Facility: HOSPITAL | Age: 29
End: 2024-11-11
Payer: COMMERCIAL

## 2024-11-12 ENCOUNTER — APPOINTMENT (OUTPATIENT)
Dept: OBSTETRICS AND GYNECOLOGY | Facility: CLINIC | Age: 29
End: 2024-11-12
Payer: COMMERCIAL

## 2024-11-12 ENCOUNTER — APPOINTMENT (OUTPATIENT)
Dept: RADIOLOGY | Facility: CLINIC | Age: 29
End: 2024-11-12
Payer: COMMERCIAL

## 2024-11-13 ENCOUNTER — APPOINTMENT (OUTPATIENT)
Dept: RADIOLOGY | Facility: CLINIC | Age: 29
End: 2024-11-13
Payer: COMMERCIAL

## 2024-11-15 ENCOUNTER — CLINICAL SUPPORT (OUTPATIENT)
Dept: EMERGENCY MEDICINE | Facility: HOSPITAL | Age: 29
End: 2024-11-15
Payer: COMMERCIAL

## 2024-11-15 ENCOUNTER — HOSPITAL ENCOUNTER (EMERGENCY)
Facility: HOSPITAL | Age: 29
Discharge: HOME | End: 2024-11-15
Payer: COMMERCIAL

## 2024-11-15 VITALS
OXYGEN SATURATION: 98 % | TEMPERATURE: 97.9 F | SYSTOLIC BLOOD PRESSURE: 105 MMHG | RESPIRATION RATE: 16 BRPM | HEART RATE: 80 BPM | WEIGHT: 280 LBS | HEIGHT: 63 IN | DIASTOLIC BLOOD PRESSURE: 81 MMHG | BODY MASS INDEX: 49.61 KG/M2

## 2024-11-15 LAB
ALBUMIN SERPL BCP-MCNC: 3.6 G/DL (ref 3.4–5)
ALP SERPL-CCNC: 71 U/L (ref 33–110)
ALT SERPL W P-5'-P-CCNC: 9 U/L (ref 7–45)
ANION GAP SERPL CALC-SCNC: 11 MMOL/L (ref 10–20)
AST SERPL W P-5'-P-CCNC: 17 U/L (ref 9–39)
BASOPHILS # BLD MANUAL: 0.05 X10*3/UL (ref 0–0.1)
BASOPHILS NFR BLD MANUAL: 0.9 %
BILIRUB SERPL-MCNC: 0.4 MG/DL (ref 0–1.2)
BUN SERPL-MCNC: 9 MG/DL (ref 6–23)
CALCIUM SERPL-MCNC: 9 MG/DL (ref 8.6–10.6)
CARDIAC TROPONIN I PNL SERPL HS: <3 NG/L (ref 0–34)
CHLORIDE SERPL-SCNC: 102 MMOL/L (ref 98–107)
CO2 SERPL-SCNC: 25 MMOL/L (ref 21–32)
CREAT SERPL-MCNC: 0.72 MG/DL (ref 0.5–1.05)
EGFRCR SERPLBLD CKD-EPI 2021: >90 ML/MIN/1.73M*2
EOSINOPHIL # BLD MANUAL: 0.26 X10*3/UL (ref 0–0.7)
EOSINOPHIL NFR BLD MANUAL: 4.3 %
ERYTHROCYTE [DISTWIDTH] IN BLOOD BY AUTOMATED COUNT: 12.8 % (ref 11.5–14.5)
GLUCOSE SERPL-MCNC: 93 MG/DL (ref 74–99)
HCT VFR BLD AUTO: 36.6 % (ref 36–46)
HGB BLD-MCNC: 12.6 G/DL (ref 12–16)
IMM GRANULOCYTES # BLD AUTO: 0.02 X10*3/UL (ref 0–0.7)
IMM GRANULOCYTES NFR BLD AUTO: 0.3 % (ref 0–0.9)
LYMPHOCYTES # BLD MANUAL: 2.07 X10*3/UL (ref 1.2–4.8)
LYMPHOCYTES NFR BLD MANUAL: 34.5 %
MAGNESIUM SERPL-MCNC: 2.08 MG/DL (ref 1.6–2.4)
MCH RBC QN AUTO: 27.6 PG (ref 26–34)
MCHC RBC AUTO-ENTMCNC: 34.4 G/DL (ref 32–36)
MCV RBC AUTO: 80 FL (ref 80–100)
MONOCYTES # BLD MANUAL: 0.36 X10*3/UL (ref 0.1–1)
MONOCYTES NFR BLD MANUAL: 6 %
NEUTS SEG # BLD MANUAL: 3.1 X10*3/UL (ref 1.2–7)
NEUTS SEG NFR BLD MANUAL: 51.7 %
NRBC BLD-RTO: 0 /100 WBCS (ref 0–0)
PLATELET # BLD AUTO: 218 X10*3/UL (ref 150–450)
POTASSIUM SERPL-SCNC: 4.1 MMOL/L (ref 3.5–5.3)
PROT SERPL-MCNC: 7.5 G/DL (ref 6.4–8.2)
RBC # BLD AUTO: 4.57 X10*6/UL (ref 4–5.2)
RBC MORPH BLD: NORMAL
SODIUM SERPL-SCNC: 134 MMOL/L (ref 136–145)
TARGETS BLD QL SMEAR: NORMAL
TOTAL CELLS COUNTED BLD: 116
VARIANT LYMPHS # BLD MANUAL: 0.16 X10*3/UL (ref 0–0.5)
VARIANT LYMPHS NFR BLD: 2.6 %
WBC # BLD AUTO: 6 X10*3/UL (ref 4.4–11.3)

## 2024-11-15 PROCEDURE — 36415 COLL VENOUS BLD VENIPUNCTURE: CPT | Performed by: EMERGENCY MEDICINE

## 2024-11-15 PROCEDURE — 84075 ASSAY ALKALINE PHOSPHATASE: CPT | Performed by: EMERGENCY MEDICINE

## 2024-11-15 PROCEDURE — 85007 BL SMEAR W/DIFF WBC COUNT: CPT | Performed by: EMERGENCY MEDICINE

## 2024-11-15 PROCEDURE — 83735 ASSAY OF MAGNESIUM: CPT | Performed by: EMERGENCY MEDICINE

## 2024-11-15 PROCEDURE — 99281 EMR DPT VST MAYX REQ PHY/QHP: CPT

## 2024-11-15 PROCEDURE — 84484 ASSAY OF TROPONIN QUANT: CPT | Performed by: EMERGENCY MEDICINE

## 2024-11-15 PROCEDURE — 93005 ELECTROCARDIOGRAM TRACING: CPT

## 2024-11-15 PROCEDURE — 85027 COMPLETE CBC AUTOMATED: CPT | Performed by: EMERGENCY MEDICINE

## 2024-11-15 ASSESSMENT — PAIN DESCRIPTION - LOCATION: LOCATION: CHEST

## 2024-11-15 ASSESSMENT — PAIN SCALES - GENERAL: PAINLEVEL_OUTOF10: 8

## 2024-11-15 ASSESSMENT — PAIN - FUNCTIONAL ASSESSMENT: PAIN_FUNCTIONAL_ASSESSMENT: 0-10

## 2024-11-15 ASSESSMENT — PAIN DESCRIPTION - PAIN TYPE: TYPE: ACUTE PAIN

## 2024-11-15 NOTE — ED TRIAGE NOTES
Patient complains of chest pain, pelvic pain, vaginal bleeding, and headache for approximately 1 week.

## 2024-11-16 LAB
ATRIAL RATE: 93 BPM
P AXIS: 73 DEGREES
P OFFSET: 209 MS
P ONSET: 157 MS
PR INTERVAL: 128 MS
Q ONSET: 221 MS
QRS COUNT: 15 BEATS
QRS DURATION: 72 MS
QT INTERVAL: 336 MS
QTC CALCULATION(BAZETT): 417 MS
QTC FREDERICIA: 389 MS
R AXIS: 72 DEGREES
T AXIS: 63 DEGREES
T OFFSET: 389 MS
VENTRICULAR RATE: 93 BPM

## 2024-11-17 ENCOUNTER — APPOINTMENT (OUTPATIENT)
Dept: RADIOLOGY | Facility: HOSPITAL | Age: 29
End: 2024-11-17
Payer: COMMERCIAL

## 2024-11-17 ENCOUNTER — CLINICAL SUPPORT (OUTPATIENT)
Dept: EMERGENCY MEDICINE | Facility: HOSPITAL | Age: 29
End: 2024-11-17
Payer: COMMERCIAL

## 2024-11-17 ENCOUNTER — HOSPITAL ENCOUNTER (EMERGENCY)
Facility: HOSPITAL | Age: 29
Discharge: HOME | End: 2024-11-17
Attending: EMERGENCY MEDICINE
Payer: COMMERCIAL

## 2024-11-17 DIAGNOSIS — R07.89 OTHER CHEST PAIN: Primary | ICD-10-CM

## 2024-11-17 LAB
ALBUMIN SERPL BCP-MCNC: 4.1 G/DL (ref 3.4–5)
ALP SERPL-CCNC: 71 U/L (ref 33–110)
ALT SERPL W P-5'-P-CCNC: 15 U/L (ref 7–45)
ANION GAP SERPL CALC-SCNC: 14 MMOL/L (ref 10–20)
AST SERPL W P-5'-P-CCNC: 15 U/L (ref 9–39)
ATRIAL RATE: 94 BPM
BASOPHILS # BLD AUTO: 0.05 X10*3/UL (ref 0–0.1)
BASOPHILS NFR BLD AUTO: 0.6 %
BILIRUB SERPL-MCNC: 0.4 MG/DL (ref 0–1.2)
BUN SERPL-MCNC: 6 MG/DL (ref 6–23)
CALCIUM SERPL-MCNC: 9.5 MG/DL (ref 8.6–10.6)
CARDIAC TROPONIN I PNL SERPL HS: <3 NG/L (ref 0–34)
CARDIAC TROPONIN I PNL SERPL HS: <3 NG/L (ref 0–34)
CHLORIDE SERPL-SCNC: 103 MMOL/L (ref 98–107)
CO2 SERPL-SCNC: 25 MMOL/L (ref 21–32)
CREAT SERPL-MCNC: 0.68 MG/DL (ref 0.5–1.05)
EGFRCR SERPLBLD CKD-EPI 2021: >90 ML/MIN/1.73M*2
EOSINOPHIL # BLD AUTO: 0.22 X10*3/UL (ref 0–0.7)
EOSINOPHIL NFR BLD AUTO: 2.8 %
ERYTHROCYTE [DISTWIDTH] IN BLOOD BY AUTOMATED COUNT: 12.8 % (ref 11.5–14.5)
FLUAV RNA RESP QL NAA+PROBE: NOT DETECTED
FLUBV RNA RESP QL NAA+PROBE: NOT DETECTED
GLUCOSE SERPL-MCNC: 87 MG/DL (ref 74–99)
HCT VFR BLD AUTO: 41.6 % (ref 36–46)
HGB BLD-MCNC: 13.5 G/DL (ref 12–16)
IMM GRANULOCYTES # BLD AUTO: 0.05 X10*3/UL (ref 0–0.7)
IMM GRANULOCYTES NFR BLD AUTO: 0.6 % (ref 0–0.9)
LYMPHOCYTES # BLD AUTO: 2.38 X10*3/UL (ref 1.2–4.8)
LYMPHOCYTES NFR BLD AUTO: 30.2 %
MAGNESIUM SERPL-MCNC: 2.14 MG/DL (ref 1.6–2.4)
MCH RBC QN AUTO: 27.2 PG (ref 26–34)
MCHC RBC AUTO-ENTMCNC: 32.5 G/DL (ref 32–36)
MCV RBC AUTO: 84 FL (ref 80–100)
MONOCYTES # BLD AUTO: 0.55 X10*3/UL (ref 0.1–1)
MONOCYTES NFR BLD AUTO: 7 %
NEUTROPHILS # BLD AUTO: 4.64 X10*3/UL (ref 1.2–7.7)
NEUTROPHILS NFR BLD AUTO: 58.8 %
NRBC BLD-RTO: 0 /100 WBCS (ref 0–0)
P AXIS: 59 DEGREES
P OFFSET: 210 MS
P ONSET: 158 MS
PLATELET # BLD AUTO: 294 X10*3/UL (ref 150–450)
POTASSIUM SERPL-SCNC: 3.8 MMOL/L (ref 3.5–5.3)
PR INTERVAL: 128 MS
PROT SERPL-MCNC: 8.5 G/DL (ref 6.4–8.2)
Q ONSET: 222 MS
QRS COUNT: 16 BEATS
QRS DURATION: 66 MS
QT INTERVAL: 322 MS
QTC CALCULATION(BAZETT): 402 MS
QTC FREDERICIA: 374 MS
R AXIS: 46 DEGREES
RBC # BLD AUTO: 4.97 X10*6/UL (ref 4–5.2)
S PYO DNA THROAT QL NAA+PROBE: NOT DETECTED
SARS-COV-2 RNA RESP QL NAA+PROBE: NOT DETECTED
SODIUM SERPL-SCNC: 138 MMOL/L (ref 136–145)
T AXIS: 34 DEGREES
T OFFSET: 383 MS
VENTRICULAR RATE: 94 BPM
WBC # BLD AUTO: 7.9 X10*3/UL (ref 4.4–11.3)

## 2024-11-17 PROCEDURE — 85025 COMPLETE CBC W/AUTO DIFF WBC: CPT | Performed by: EMERGENCY MEDICINE

## 2024-11-17 PROCEDURE — 80053 COMPREHEN METABOLIC PANEL: CPT | Performed by: EMERGENCY MEDICINE

## 2024-11-17 PROCEDURE — 71275 CT ANGIOGRAPHY CHEST: CPT

## 2024-11-17 PROCEDURE — 84484 ASSAY OF TROPONIN QUANT: CPT | Performed by: EMERGENCY MEDICINE

## 2024-11-17 PROCEDURE — 93005 ELECTROCARDIOGRAM TRACING: CPT | Mod: 59

## 2024-11-17 PROCEDURE — 2550000001 HC RX 255 CONTRASTS: Mod: SE | Performed by: EMERGENCY MEDICINE

## 2024-11-17 PROCEDURE — 2500000004 HC RX 250 GENERAL PHARMACY W/ HCPCS (ALT 636 FOR OP/ED): Mod: SE

## 2024-11-17 PROCEDURE — 83735 ASSAY OF MAGNESIUM: CPT | Performed by: EMERGENCY MEDICINE

## 2024-11-17 PROCEDURE — 99285 EMERGENCY DEPT VISIT HI MDM: CPT | Mod: 25

## 2024-11-17 PROCEDURE — 36415 COLL VENOUS BLD VENIPUNCTURE: CPT | Performed by: EMERGENCY MEDICINE

## 2024-11-17 PROCEDURE — 71045 X-RAY EXAM CHEST 1 VIEW: CPT

## 2024-11-17 PROCEDURE — 93005 ELECTROCARDIOGRAM TRACING: CPT

## 2024-11-17 PROCEDURE — 96374 THER/PROPH/DIAG INJ IV PUSH: CPT

## 2024-11-17 PROCEDURE — 71275 CT ANGIOGRAPHY CHEST: CPT | Mod: FOREIGN READ | Performed by: RADIOLOGY

## 2024-11-17 PROCEDURE — 2500000001 HC RX 250 WO HCPCS SELF ADMINISTERED DRUGS (ALT 637 FOR MEDICARE OP): Mod: SE

## 2024-11-17 PROCEDURE — 99285 EMERGENCY DEPT VISIT HI MDM: CPT | Performed by: EMERGENCY MEDICINE

## 2024-11-17 PROCEDURE — 87636 SARSCOV2 & INF A&B AMP PRB: CPT

## 2024-11-17 PROCEDURE — 71045 X-RAY EXAM CHEST 1 VIEW: CPT | Mod: FOREIGN READ | Performed by: RADIOLOGY

## 2024-11-17 PROCEDURE — 93010 ELECTROCARDIOGRAM REPORT: CPT | Performed by: EMERGENCY MEDICINE

## 2024-11-17 PROCEDURE — 87651 STREP A DNA AMP PROBE: CPT

## 2024-11-17 RX ORDER — ACETAMINOPHEN 325 MG/1
650 TABLET ORAL EVERY 6 HOURS PRN
Qty: 30 TABLET | Refills: 0 | Status: SHIPPED | OUTPATIENT
Start: 2024-11-17

## 2024-11-17 RX ORDER — ONDANSETRON HYDROCHLORIDE 2 MG/ML
4 INJECTION, SOLUTION INTRAVENOUS ONCE
Status: COMPLETED | OUTPATIENT
Start: 2024-11-17 | End: 2024-11-17

## 2024-11-17 RX ORDER — ACETAMINOPHEN 325 MG/1
975 TABLET ORAL ONCE
Status: COMPLETED | OUTPATIENT
Start: 2024-11-17 | End: 2024-11-17

## 2024-11-17 RX ORDER — IBUPROFEN 600 MG/1
600 TABLET ORAL EVERY 6 HOURS PRN
Qty: 30 TABLET | Refills: 0 | Status: SHIPPED | OUTPATIENT
Start: 2024-11-17 | End: 2024-11-25

## 2024-11-17 ASSESSMENT — PAIN DESCRIPTION - PROGRESSION: CLINICAL_PROGRESSION: NOT CHANGED

## 2024-11-17 ASSESSMENT — COLUMBIA-SUICIDE SEVERITY RATING SCALE - C-SSRS
1. IN THE PAST MONTH, HAVE YOU WISHED YOU WERE DEAD OR WISHED YOU COULD GO TO SLEEP AND NOT WAKE UP?: NO
6. HAVE YOU EVER DONE ANYTHING, STARTED TO DO ANYTHING, OR PREPARED TO DO ANYTHING TO END YOUR LIFE?: NO
2. HAVE YOU ACTUALLY HAD ANY THOUGHTS OF KILLING YOURSELF?: NO

## 2024-11-17 ASSESSMENT — PAIN - FUNCTIONAL ASSESSMENT
PAIN_FUNCTIONAL_ASSESSMENT: 0-10

## 2024-11-17 ASSESSMENT — PAIN SCALES - GENERAL
PAINLEVEL_OUTOF10: 0 - NO PAIN
PAINLEVEL_OUTOF10: 5 - MODERATE PAIN

## 2024-11-17 ASSESSMENT — PAIN DESCRIPTION - DESCRIPTORS: DESCRIPTORS: TENDER;SORE

## 2024-11-17 NOTE — ED TRIAGE NOTES
Intermitted non radiating L. Sided CP that is worst at night x 1 week. Pt states she is s/p DNC 11/1 and since started blood thinners. Pt states she is unsure if the pain is related

## 2024-11-18 VITALS
DIASTOLIC BLOOD PRESSURE: 82 MMHG | HEART RATE: 99 BPM | OXYGEN SATURATION: 100 % | WEIGHT: 280 LBS | SYSTOLIC BLOOD PRESSURE: 101 MMHG | TEMPERATURE: 98.2 F | BODY MASS INDEX: 49.61 KG/M2 | HEIGHT: 63 IN | RESPIRATION RATE: 18 BRPM

## 2024-11-18 NOTE — ED PROVIDER NOTES
Emergency Department Provider Note        History of Present Illness     History provided by: Patient  Limitations to History: None  External Records Reviewed with Brief Summary:  OBGYN note  patient was placed on prophylactic Lovenox     HPI:  Shefali Arango is a 29 y.o. female history of postpartum PE presenting with intermittent left-sided chest pain nonradiating that started 4 days ago.  She reports it is worse at night and is exacerbated by movement.  Of note patient recently had a D&C on  and was started on Lovenox.  She is reporting remittent heavy bleeding after D&C. Patient is also reporting subjective fevers and occasional chills. Patient reports she has been less active than she normally is. She is also reporting diffuse myalgias. She didn't take anything for pain today. She is also reporting a sore throat and coughing up so red tinged mucus.     Physical Exam   Triage vitals:  T 35.9 °C (96.6 °F)  HR 98  /71  RR 18  O2 98 % None (Room air)    General: Awake, alert, in no acute distress  Eyes: Gaze conjugate.  No scleral icterus or injection  HENT: Normo-cephalic, atraumatic. No stridor  CV: Regular rate, regular rhythm. Radial pulses 2+ bilaterally  Resp: Breathing non-labored, speaking in full sentences.  Clear to auscultation bilaterally  GI: Soft, non-distended, non-tender. No rebound or guarding.  : Deferred  MSK/Extremities: No gross bony deformities. Moving all extremities.    Skin: Warm. Appropriate color  Neuro: Alert. Oriented. Face symmetric. Speech is fluent.  Gross strength and sensation intact in b/l UE and LEs  Psych: Appropriate mood and affect    Medical Decision Making & ED Course   Medical Decision Makin y.o. female presenting with intermittent left-sided chest pain.  Given patient has a history of PE and was mildly tachycardic we will proceed with CT PE to rule out PE.  Other differentials, such as ACS, PE, PNA were considered. Labs ordered: CBC CMP  troponin strep PCR COVID PCR influenza PCR. Treatments including tylenol and zofran were administered. Labs reviewed and were all largely unremarkable, troponin wasn't elevated and influenza, COVID, strep were all negative. Imaging reviewed and was negative for PE, and chest x-ray did not show any acute pathology. On re-examination of patient, patient chest pain had resolved. Patient and/or patient's representative was counseled regarding labs, imaging, likely diagnosis, and plan. All questions were answered.  Return precautions were discussed and patient was instructed to follow-up with her PCP.  ----    Social Determinants of Health which Significantly Impact Care: None identified     EKG Independent Interpretation: The EKG obtained at 1834 was independently interpreted by myself. It demonstrates normal sinus rhythm with a ventricular rate of 94.  Normal axis. Intervals within normal limits. ST segments showed no evidence of ischemia.  Similar when compared to prior EKG from November 15, 2024    Independent Result Review and Interpretation: Relevant laboratory and radiographic results were reviewed and independently interpreted by myself.  As necessary, they are commented on in the ED Course.    Chronic conditions affecting the patient's care: As documented above in [facility]    The patient was discussed with the following consultants/services: None    Care Considerations: As documented above in [facility]    ED Course:  ED Course as of 11/18/24 0553   Sun Nov 17, 2024   2213 XR chest 1 view  No acute infiltrate or effusion. [SL]   2310 CT angio chest for pulmonary embolism  No CT evidence of pulmonary embolism.  No consolidation.  No pleural or pericardial effusion.  No pneumothorax. Mild splenomegaly. [SL]      ED Course User Index  [SL] Faustina Mariano MD         Diagnoses as of 11/18/24 0553   Other chest pain     Disposition   As a result of the work-up, the patient was discharged home.  she was informed of her diagnosis and  instructed to come back with any concerns or worsening of condition.  she and was agreeable to the plan as discussed above.  she was given the opportunity to ask questions.  All of the patient's questions were answered.    Procedures   Procedures    Patient seen and discussed with ED attending physician.    Faustina Mariano MD  Emergency Medicine     Faustina Mariano MD  Resident  11/18/24 1965

## 2024-11-19 ENCOUNTER — APPOINTMENT (OUTPATIENT)
Dept: PRIMARY CARE | Facility: CLINIC | Age: 29
End: 2024-11-19
Payer: COMMERCIAL

## 2024-11-25 LAB
ELECTRONICALLY SIGNED BY CYTOGENETICS: NORMAL
MICROARRAY PLATFORM: NORMAL

## 2024-12-20 ENCOUNTER — APPOINTMENT (OUTPATIENT)
Dept: RADIOLOGY | Facility: HOSPITAL | Age: 29
End: 2024-12-20
Payer: COMMERCIAL

## 2024-12-20 ENCOUNTER — HOSPITAL ENCOUNTER (EMERGENCY)
Facility: HOSPITAL | Age: 29
Discharge: HOME | End: 2024-12-20
Payer: COMMERCIAL

## 2024-12-20 VITALS
HEIGHT: 63 IN | HEART RATE: 93 BPM | SYSTOLIC BLOOD PRESSURE: 145 MMHG | TEMPERATURE: 97.2 F | DIASTOLIC BLOOD PRESSURE: 86 MMHG | OXYGEN SATURATION: 98 % | WEIGHT: 260 LBS | RESPIRATION RATE: 18 BRPM | BODY MASS INDEX: 46.07 KG/M2

## 2024-12-20 DIAGNOSIS — S63.602A SPRAIN OF LEFT THUMB, UNSPECIFIED SITE OF DIGIT, INITIAL ENCOUNTER: Primary | ICD-10-CM

## 2024-12-20 LAB — PREGNANCY TEST URINE, POC: NEGATIVE

## 2024-12-20 PROCEDURE — 99283 EMERGENCY DEPT VISIT LOW MDM: CPT

## 2024-12-20 PROCEDURE — 73130 X-RAY EXAM OF HAND: CPT | Mod: LT

## 2024-12-20 PROCEDURE — 81025 URINE PREGNANCY TEST: CPT

## 2024-12-20 PROCEDURE — 99284 EMERGENCY DEPT VISIT MOD MDM: CPT

## 2024-12-20 PROCEDURE — 73130 X-RAY EXAM OF HAND: CPT | Mod: LEFT SIDE | Performed by: RADIOLOGY

## 2024-12-20 ASSESSMENT — PAIN SCALES - GENERAL
PAINLEVEL_OUTOF10: 9
PAINLEVEL_OUTOF10: 9

## 2024-12-20 ASSESSMENT — PAIN DESCRIPTION - PAIN TYPE: TYPE: ACUTE PAIN

## 2024-12-20 ASSESSMENT — PAIN DESCRIPTION - DESCRIPTORS: DESCRIPTORS: BURNING

## 2024-12-20 ASSESSMENT — PAIN DESCRIPTION - LOCATION
LOCATION: HAND
LOCATION: FINGER (COMMENT WHICH ONE)

## 2024-12-20 ASSESSMENT — PAIN DESCRIPTION - ORIENTATION: ORIENTATION: LEFT

## 2024-12-20 ASSESSMENT — PAIN - FUNCTIONAL ASSESSMENT: PAIN_FUNCTIONAL_ASSESSMENT: 0-10

## 2024-12-20 NOTE — ED PROVIDER NOTES
"    History of Present Illness       History provided by: Patient  Limitations to History: None  External Records Reviewed with Brief Summary: None    HPI:  HPI   This is a 29-year-old female with left thumb pain and swelling since last night. She describes it as \"burning\".  She reports she has had a history of thumb sprains and denies trauma or injury to the area, however she manny hair often and reports this worsens her pain. She is right handed.  She has not taken any pain medication but in the past was given a splint which helped her pain.  She denies numbness, tingling, pain out of proportion, snuffbox tenderness, chest pain, shortness of breath.     Physical Exam   Physical Exam     Triage vitals:  T 36.2 °C (97.2 °F)  HR 93  /86  RR 18  O2 98 % None (Room air)      Physical exam:   General: Vitals noted, no distress. Afebrile.   EENT:  Hearing grossly intact. Normal phonation. MMM. Airway patent. PERRL. EOMI.   Neck: No midline tenderness or paraspinal tenderness. FROM.   Cardiac: Regular, rate, rhythm. Normal S1 and S2.  No murmurs, gallops, rubs.   Pulmonary: Good air exchange. Lungs clear bilaterally. No wheezes, rhonchi, rales. No accessory muscle use.   Abdomen: Soft, nonsurgical. Nontender. No peritoneal signs.   Back: No CVA tenderness. No midline tenderness or paraspinal tenderness. No obvious deformity.   Extremities: Left thumb edematous with tenderness and limited ROM. No snuffbox tenderness. Other digits and wrist with full ROM.  Moves all other extremities freely. No tenderness throughout extremities.   Skin: No rash. Warm and Dry.   Neuro: No focal neurologic deficits. CN 2-12 grossly intact. Sensation equal bilaterally. No weakness.           Medical Decision Making & ED Course     ED Course & MDM       Medical Decision Making:  Medical Decision Making  This is a 29-year-old female with left thumb pain and swelling.  She denies trauma to the area, no recent falls, however she reports " "she manny hair often and has had a history of left thumb pain.  She has no snuffbox tenderness.  She has limited range of motion of her left thumb, tenderness with palpation, so x-ray of hand will be obtained to rule out fracture or effusion. She is right handed. She denied pain medication at this time   Urine pregnancy negative. XR negative for fracture and is unchanged from 2020.   Placed in a splint and given an ortho referral. Discharged home in stable condition.     Patient discussed and staffed with Francie Cash PA-C     ----    Differential diagnoses considered include but are not limited to: osteoarthritis, thumb sprain, fracture, scaphoid fracture      Social Determinants of Health which Significantly Impact Care: None identified     Independent Result Review and Interpretation: Relevant laboratory and radiographic results were reviewed and independently interpreted by myself.  As necessary, they are commented on in the ED Course.    Chronic conditions affecting the patient's care: As documented above in MDM    The patient was discussed with the following consultants/services: None    Care Considerations: As documented above in MDM      Visit Vitals  /86   Pulse 93   Temp 36.2 °C (97.2 °F)   Resp 18   Ht 1.6 m (5' 3\")   Wt 118 kg (260 lb)   SpO2 98%   BMI 46.06 kg/m²   OB Status Recent pregnancy   Smoking Status Never   BSA 2.29 m²        Labs Reviewed   POCT PREGNANCY, URINE       Result Value    Preg Test, Ur Negative         XR hand left 3+ views   Final Result   No acute osseous abnormalities.   Signed by Logan Bailey MD          ED Course:  ED Course as of 12/20/24 1600   Fri Dec 20, 2024   1220 Patient is a 29-year-old female presents to the ED for left hand pain.  Localizes the majority of the pain around the left thumb that extends to her left hand.  States that her hand feels swollen and like it is \"on fire\".  On examination, patient has good range of motion of the hand and wrist.  2+ radial " pulse.  Distal sensation intact.  Declines pain medication at this time.  X-rays obtained.  Given orthopedic follow-up.  Advised on conservative management.    I was present with the CLEMENTINA who participated in the documentation of this note. I have personally seen and re-examined the patient and performed the medical decision-making components (assessment and plan of care). I have reviewed the CLEMENTINA documentation and verified the findings in the note as written with additions or exceptions as stated in the body of this note.    Francie Cash PA-C   [MG]   1544 XR hand left 3+ views [HB]      ED Course User Index  [HB] Nandini Echols PA-C  [MG] Francie Cash PA-C         Diagnoses as of 12/20/24 1600   Sprain of left thumb, unspecified site of digit, initial encounter     Disposition   Discharge home in stable condition.     Procedures   Procedures    Nandini Echols PA-C  Emergency Medicine      Nandini Echols PA-C  12/20/24 [id number]

## 2024-12-20 NOTE — DISCHARGE INSTRUCTIONS
Follow-up with orthopedic for your hand.  Use brace as needed for pain.  Take anti-inflammatories for pain  Return to ED if symptoms worsen or new symptoms arise.

## 2025-01-02 ENCOUNTER — HOSPITAL ENCOUNTER (EMERGENCY)
Facility: HOSPITAL | Age: 30
Discharge: HOME | End: 2025-01-02
Payer: COMMERCIAL

## 2025-01-02 VITALS
DIASTOLIC BLOOD PRESSURE: 89 MMHG | RESPIRATION RATE: 18 BRPM | OXYGEN SATURATION: 95 % | WEIGHT: 260 LBS | HEIGHT: 63 IN | HEART RATE: 89 BPM | BODY MASS INDEX: 46.07 KG/M2 | SYSTOLIC BLOOD PRESSURE: 146 MMHG | TEMPERATURE: 97.3 F

## 2025-01-02 DIAGNOSIS — B34.9 VIRAL SYNDROME: Primary | ICD-10-CM

## 2025-01-02 LAB
ALBUMIN SERPL BCP-MCNC: 3.9 G/DL (ref 3.4–5)
ALP SERPL-CCNC: 71 U/L (ref 33–110)
ALT SERPL W P-5'-P-CCNC: 6 U/L (ref 7–45)
ANION GAP SERPL CALC-SCNC: 12 MMOL/L (ref 10–20)
APPEARANCE UR: ABNORMAL
AST SERPL W P-5'-P-CCNC: 9 U/L (ref 9–39)
BASOPHILS # BLD AUTO: 0.02 X10*3/UL (ref 0–0.1)
BASOPHILS NFR BLD AUTO: 0.2 %
BILIRUB SERPL-MCNC: 0.4 MG/DL (ref 0–1.2)
BILIRUB UR STRIP.AUTO-MCNC: NEGATIVE MG/DL
BUN SERPL-MCNC: 7 MG/DL (ref 6–23)
CALCIUM SERPL-MCNC: 9.4 MG/DL (ref 8.6–10.6)
CHLORIDE SERPL-SCNC: 104 MMOL/L (ref 98–107)
CO2 SERPL-SCNC: 25 MMOL/L (ref 21–32)
COLOR UR: ABNORMAL
CREAT SERPL-MCNC: 0.61 MG/DL (ref 0.5–1.05)
EGFRCR SERPLBLD CKD-EPI 2021: >90 ML/MIN/1.73M*2
EOSINOPHIL # BLD AUTO: 0.37 X10*3/UL (ref 0–0.7)
EOSINOPHIL NFR BLD AUTO: 4.5 %
ERYTHROCYTE [DISTWIDTH] IN BLOOD BY AUTOMATED COUNT: 13.5 % (ref 11.5–14.5)
FLUAV RNA RESP QL NAA+PROBE: NOT DETECTED
FLUBV RNA RESP QL NAA+PROBE: NOT DETECTED
GLUCOSE SERPL-MCNC: 88 MG/DL (ref 74–99)
GLUCOSE UR STRIP.AUTO-MCNC: NORMAL MG/DL
HCT VFR BLD AUTO: 36.9 % (ref 36–46)
HGB BLD-MCNC: 12 G/DL (ref 12–16)
IMM GRANULOCYTES # BLD AUTO: 0.03 X10*3/UL (ref 0–0.7)
IMM GRANULOCYTES NFR BLD AUTO: 0.4 % (ref 0–0.9)
KETONES UR STRIP.AUTO-MCNC: NEGATIVE MG/DL
LEUKOCYTE ESTERASE UR QL STRIP.AUTO: ABNORMAL
LYMPHOCYTES # BLD AUTO: 1.56 X10*3/UL (ref 1.2–4.8)
LYMPHOCYTES NFR BLD AUTO: 19 %
MCH RBC QN AUTO: 25.4 PG (ref 26–34)
MCHC RBC AUTO-ENTMCNC: 32.5 G/DL (ref 32–36)
MCV RBC AUTO: 78 FL (ref 80–100)
MONOCYTES # BLD AUTO: 0.54 X10*3/UL (ref 0.1–1)
MONOCYTES NFR BLD AUTO: 6.6 %
MUCOUS THREADS #/AREA URNS AUTO: NORMAL /LPF
NEUTROPHILS # BLD AUTO: 5.7 X10*3/UL (ref 1.2–7.7)
NEUTROPHILS NFR BLD AUTO: 69.3 %
NITRITE UR QL STRIP.AUTO: NEGATIVE
NRBC BLD-RTO: 0 /100 WBCS (ref 0–0)
PH UR STRIP.AUTO: 7 [PH]
PLATELET # BLD AUTO: 374 X10*3/UL (ref 150–450)
POTASSIUM SERPL-SCNC: 3.9 MMOL/L (ref 3.5–5.3)
PROT SERPL-MCNC: 7.9 G/DL (ref 6.4–8.2)
PROT UR STRIP.AUTO-MCNC: NEGATIVE MG/DL
RBC # BLD AUTO: 4.73 X10*6/UL (ref 4–5.2)
RBC # UR STRIP.AUTO: NEGATIVE /UL
RBC #/AREA URNS AUTO: NORMAL /HPF
SARS-COV-2 RNA RESP QL NAA+PROBE: NOT DETECTED
SODIUM SERPL-SCNC: 137 MMOL/L (ref 136–145)
SP GR UR STRIP.AUTO: 1.02
SQUAMOUS #/AREA URNS AUTO: NORMAL /HPF
UROBILINOGEN UR STRIP.AUTO-MCNC: NORMAL MG/DL
WBC # BLD AUTO: 8.2 X10*3/UL (ref 4.4–11.3)
WBC #/AREA URNS AUTO: NORMAL /HPF

## 2025-01-02 PROCEDURE — 99284 EMERGENCY DEPT VISIT MOD MDM: CPT | Performed by: PHYSICIAN ASSISTANT

## 2025-01-02 PROCEDURE — 87086 URINE CULTURE/COLONY COUNT: CPT | Performed by: PHYSICIAN ASSISTANT

## 2025-01-02 PROCEDURE — 81001 URINALYSIS AUTO W/SCOPE: CPT | Performed by: PHYSICIAN ASSISTANT

## 2025-01-02 PROCEDURE — 99283 EMERGENCY DEPT VISIT LOW MDM: CPT

## 2025-01-02 PROCEDURE — 36415 COLL VENOUS BLD VENIPUNCTURE: CPT | Performed by: PHYSICIAN ASSISTANT

## 2025-01-02 PROCEDURE — 80053 COMPREHEN METABOLIC PANEL: CPT | Performed by: PHYSICIAN ASSISTANT

## 2025-01-02 PROCEDURE — 2500000004 HC RX 250 GENERAL PHARMACY W/ HCPCS (ALT 636 FOR OP/ED): Performed by: PHYSICIAN ASSISTANT

## 2025-01-02 PROCEDURE — 85025 COMPLETE CBC W/AUTO DIFF WBC: CPT | Performed by: PHYSICIAN ASSISTANT

## 2025-01-02 PROCEDURE — 87636 SARSCOV2 & INF A&B AMP PRB: CPT | Performed by: PHYSICIAN ASSISTANT

## 2025-01-02 RX ORDER — ONDANSETRON 4 MG/1
4 TABLET, ORALLY DISINTEGRATING ORAL ONCE
Status: COMPLETED | OUTPATIENT
Start: 2025-01-02 | End: 2025-01-02

## 2025-01-02 RX ORDER — ONDANSETRON 4 MG/1
4 TABLET, ORALLY DISINTEGRATING ORAL 3 TIMES DAILY PRN
Qty: 21 TABLET | Refills: 0 | Status: SHIPPED | OUTPATIENT
Start: 2025-01-02 | End: 2025-01-09

## 2025-01-02 RX ADMIN — ONDANSETRON 4 MG: 4 TABLET, ORALLY DISINTEGRATING ORAL at 21:05

## 2025-01-02 ASSESSMENT — PAIN - FUNCTIONAL ASSESSMENT: PAIN_FUNCTIONAL_ASSESSMENT: 0-10

## 2025-01-02 ASSESSMENT — PAIN SCALES - GENERAL: PAINLEVEL_OUTOF10: 0 - NO PAIN

## 2025-01-03 LAB
BACTERIA UR CULT: NORMAL
HOLD SPECIMEN: NORMAL

## 2025-01-03 NOTE — ED TRIAGE NOTES
Pt presented with c/o abdominal pain and vomiting, pt is also c/o general weakness and chills from vagina up her body

## 2025-01-03 NOTE — ED PROVIDER NOTES
"HPI   Chief Complaint   Patient presents with    Vomiting    Weakness, Gen    Flu Symptoms       HPI: Patient is a 29-year-old female with a history of PE on blood thinners, IBS, asthma, obesity presents to the ED for generalized weakness.  Patient states that yesterday her symptoms started with a bout of abdominal pain.  States that her abdominal pain was diffuse at that time.  States that she took a Tylenol which helped alleviate her pain but since then has been having intermittent bouts of weakness, feeling hot and cold and having chills which she states radiates from \"her vagina up into the rest of my body\".  She notes associated nausea and 1 episode of vomiting but denies any diarrhea.  Denies any urinary symptoms however states that her symptoms feel somewhat similar to when she had a kidney infection.  States that her abdominal pain is currently resolved.  ------------------------------------------------------------------------------------------------------------------------------------------  ROS: a ten point review of systems was performed and was negative except as per HPI.  ------------------------------------------------------------------------------------------------------------------------------------------  PMH / PSH: as per HPI, otherwise reviewed   MEDS: as per HPI, otherwise reviewed in EMR  ALLERGIES: as per HPI, otherwise reviewed in EMR  SocH:  as per HPI, otherwise reviewed in EMR  FH:  as per HPI, otherwise reviewed in EMR   ------------------------------------------------------------------------------------------------------------------------------------------  Physical Exam:  VS: As documented in the triage note and EMR flowsheet from this visit was reviewed  General: Well appearing. No acute distress.   Eyes:  Extraocular movements grossly intact. No scleral icterus.   Head: Atraumatic. Normocephalic.     Neck: No meningismus. No gross masses. Full movement through range of motion  CV: " Regular rhythm. No murmurs, rubs, gallops appreciated.   Resp: Clear to auscultation bilaterally. No respiratory distress.    GI: Nontender. Soft. No masses. No rebound, rigidity or guarding.   MSK: Symmetric muscle bulk. No gross step offs or deformities.  Skin: Warm, dry. No rashes  Neuro: CN II-VII intact. A&O x3. Speech fluent. Alert. Moving all extremities. Ambulates with normal gait  Psych: Appropriate mood and affect for situation  ------------------------------------------------------------------------------------------------------------------------------------------  Hospital Course / Medical Decision Making: Patient is a 29-year-old female who presents to the ED for generalized weakness.  States that her symptoms started with a bout of abdominal pain, nausea and vomiting yesterday.  States her abdominal pain is currently resolved but since then she has felt generally weak and having hot and cold chills.  On examination, patient is well-appearing.  Vitals are stable.  Abdominal examination is benign.  Patient administered Zofran for symptom management.  CBC shows no evidence of leukocytosis or anemia.  CMP without renal, electrolyte or hepatic abnormality.  Urinalysis without evidence of infection.  COVID and flu are negative.  Patient states that she is feeling improved.  Suspect viral gastroenteritis.  Patient written a prescription for Zofran.  Advised on oral hydration. As a result of the work-up, the patient was discharged home in stable condition.  They were informed of the diagnosis and instructed to come back with any concerns or worsening of condition.  Agreeable to the plan as discussed above.  Patient given the opportunity to ask questions.  All of the patient's questions were answered.                 Patient History   Past Medical History:   Diagnosis Date    Abdominal pain 07/25/2024    Comment on above: ABD PAIN     ABDOMINAL PAIN      Asthma     Bronchitis     Candidiasis of vagina  07/25/2024    Essential (primary) hypertension 10/31/2014    Elevated BP    History of IBS     Hx of chlamydia infection     Hx of pulmonary embolus     Morbid (severe) obesity due to excess calories (Multi) 12/16/2019    Morbid obesity with BMI of 50.0-59.9, adult    Pre-eclampsia 02/13/2020    Pulmonary embolism 03/04/2021     Past Surgical History:   Procedure Laterality Date    CHOLECYSTECTOMY      GALLBLADDER SURGERY  03/15/2018    Gallbladder Surgery     Family History   Problem Relation Name Age of Onset    Heart failure Mother      Coronary artery disease Mother      Hypertension Mother      Deep vein thrombosis Mother      Seizures Mother      COPD Mother      Thyroid disease Mother      Pulmonary embolism Mother      Colon cancer Maternal Grandmother      Heart failure Maternal Grandfather      Hypertension Maternal Grandfather      COPD Maternal Grandfather      Diabetes Paternal Grandmother      Breast cancer Other       Social History     Tobacco Use    Smoking status: Never    Smokeless tobacco: Never   Vaping Use    Vaping status: Never Used   Substance Use Topics    Alcohol use: Not Currently     Alcohol/week: 1.0 - 2.0 standard drink of alcohol     Types: 1 - 2 Glasses of wine per week    Drug use: Not Currently     Types: Marijuana       Physical Exam   ED Triage Vitals [01/02/25 1931]   Temperature Heart Rate Respirations BP   36.3 °C (97.3 °F) 89 18 146/89      Pulse Ox Temp Source Heart Rate Source Patient Position   95 % Temporal -- --      BP Location FiO2 (%)     -- --       Physical Exam      ED Course & MDM   Diagnoses as of 01/02/25 2247   Viral syndrome                 No data recorded                                 Medical Decision Making      Procedure  Procedures     Francie Cash PA-C  01/02/25 2251

## 2025-01-06 NOTE — PROGRESS NOTES
Shefali Arango is a 29 y.o. female with past medical history of HTN, asthma, DVT/PE (s/p eliquis at age of 26), IBS, and obesity who presents today for follow up of nausea. Initially seen 8/2024. States she felt fine several months ago. Then Summer 2024 developed a variety of symptoms including dizziness, pre-syncope, nausea, and vomiting.     ER 7/2024 CT showed no acute abdominopelvic pathology. Thought to have UTI and given antibiotics and improved some. No longer vomiting but still very light headed, occurring daily. Would get hot, dizzy, and develop nausea. Zofran helps sometimes. Had MRI Brain that was unremarkable with plans to see Neurology. She also endorsed heartburn/reflux, taking TUMS. Bowels fluctuate (sometimes move daily and sometimes will go 4 days without BM).     CBC and BMP reassuring. B12 and celiac serologies normal. X-ray showed no constipation. CT reassuring with normal bowel. No NSAIDS. No prior EGD or colonoscopy. Hx of cholecystectomy.    Gastric emptying study, H. Pylori, and fecal calprotectin stool test ordered but not obtained. Given Prilosec but did not take. Advised to follow up Neurologist about being tested for POTS. She had an appointment but missed this.    Presents today for follow up. Nausea improved some when off her birth control. She became pregnant and overall felt well. She had D&C in November. Felt OK for a few months but now symptoms have progressively worsened again. Exactly the same. Will get hot and nauseated. Zofran helps but she just doesn't feel right. Some bloating and gas. Takes TUMS. Bowels continue to fluctuate but she has felt constipated lately.      Family history: Mother had GI issues but unsure exact diagnosis. Denies family history of colon cancer or other GI disorders or malignancy.     Past Medical History:   Diagnosis Date    Abdominal pain 07/25/2024    Comment on above: ABD PAIN     ABDOMINAL PAIN      Asthma     Bronchitis     Candidiasis of  vagina 07/25/2024    Essential (primary) hypertension 10/31/2014    Elevated BP    History of IBS     Hx of chlamydia infection     Hx of pulmonary embolus     Morbid (severe) obesity due to excess calories (Multi) 12/16/2019    Morbid obesity with BMI of 50.0-59.9, adult    Pre-eclampsia 02/13/2020    Pulmonary embolism 03/04/2021     Past Surgical History:   Procedure Laterality Date    CHOLECYSTECTOMY      GALLBLADDER SURGERY  03/15/2018    Gallbladder Surgery     Current Outpatient Medications   Medication Sig Dispense Refill    acetaminophen (Tylenol) 500 mg tablet Take 1 tablet (500 mg) by mouth every 6 hours if needed for mild pain (1 - 3) for up to 7 days. 28 tablet 0    alcohol swabs pads, medicated Use 1, up to 5 times a day 200 each 3    cholecalciferol (Vitamin D-3) 50 MCG (2000 UT) tablet Take 1 tablet (50 mcg) by mouth once daily. 90 tablet 3    ferrous sulfate, 325 mg ferrous sulfate, tablet Take 1 tablet (325 mg) by mouth once daily with breakfast. 30 tablet 11    fluticasone (Flovent HFA) 110 mcg/actuation inhaler Inhale 1 puff 2 times a day. Rinse mouth with water after use to reduce aftertaste and incidence of candidiasis. Do not swallow. 12 g 11    guaiFENesin (Mucinex) 600 mg 12 hr tablet Take 2 tablets (1,200 mg) by mouth 2 times a day. Do not crush, chew, or split. 120 tablet 11    nitrofurantoin, macrocrystal-monohydrate, (Macrobid) 100 mg capsule Take 1 capsule (100 mg) by mouth 2 times a day for 5 days. 10 capsule 0    peak flow meter device 1 kit once daily. 1 each 0    prenatal vit 14-iron fum-folic 29 mg iron- 1 mg tablet,chewable Chew 1 Units once daily. 30 each 11    sodium chloride (Ocean) 0.65 % nasal spray Administer 1 spray into each nostril if needed for congestion. 30 mL 0    fluticasone (Flonase) 50 mcg/actuation nasal spray Administer 1 spray into each nostril once daily for 10 days. Shake gently. Before first use, prime pump. After use, clean tip and replace cap. 16 g 0     omeprazole (PriLOSEC) 40 mg DR capsule Take 1 capsule (40 mg) by mouth once daily. Do not crush or chew. 90 capsule 3     No current facility-administered medications for this visit.         Review of Systems  Review of Systems negative except as noted in HPI.    Objective     /84   Pulse 106   Temp 36.5 °C (97.7 °F)   Wt 129 kg (285 lb)   SpO2 98% Comment: RA  BMI 50.49 kg/m²      Physical Exam  Constitutional:  No acute distress. Normal appearance. Not ill-appearing.  HENT:  Head normocephalic and atraumatic. Conjunctivae normal.  Cardiovascular:  Normal rate. Regular rhythm.  Pulmonary:  Pulmonary effort normal. No respiratory distress. Breath sounds clear.  Abdominal:  Abdomen is soft. There is no distension. No tenderness or guarding.  Skin: Dry.  Neurological:  Alert and oriented.  Psychiatric:  Mood and affect normal.    Assessment/Plan     29 y.o. female with history of HTN, asthma, DVT/PE (s/p eliquis at age of 26), IBS, and obesity who presents today for clinic visit for several month history of lightheadedness, dizziness, and nausea. Her symptoms occur daily and have been interfering with her daily routine. CT abdomen/pelvis and MRI brain reassuring.     At this point in time unsure if her symptoms are due to a GI etiology, as she notes some improvement when off birth control and pregnant. However, we discussed ruling out H. Pylori and starting PPI therapy for possible gastritis. We will also exclude gastroparesis.      Recommendations  Start omeprazole 40 mg on empty stomach for 4-8 weeks.  Obtain stool tests to check for H. Pylori infection and fecal calprotectin to check for inflammation.  Obtain gastric emptying study to exclude gastroparesis.   Follow up Neurologist about being tested for POTS.  Follow up with GYN.  Follow up in 3 months.    Electronically signed by: Apoorva Christensen CNP on 1/13/2025 at 2:42 PM

## 2025-01-11 ENCOUNTER — HOSPITAL ENCOUNTER (EMERGENCY)
Facility: HOSPITAL | Age: 30
Discharge: HOME | End: 2025-01-11
Payer: COMMERCIAL

## 2025-01-11 ENCOUNTER — APPOINTMENT (OUTPATIENT)
Dept: RADIOLOGY | Facility: HOSPITAL | Age: 30
End: 2025-01-11
Payer: COMMERCIAL

## 2025-01-11 VITALS
BODY MASS INDEX: 46.07 KG/M2 | OXYGEN SATURATION: 99 % | RESPIRATION RATE: 20 BRPM | HEART RATE: 92 BPM | WEIGHT: 260 LBS | HEIGHT: 63 IN | DIASTOLIC BLOOD PRESSURE: 86 MMHG | TEMPERATURE: 97.3 F | SYSTOLIC BLOOD PRESSURE: 150 MMHG

## 2025-01-11 DIAGNOSIS — R10.2 FEMALE PELVIC PAIN: Primary | ICD-10-CM

## 2025-01-11 DIAGNOSIS — R82.71 BACTERIURIA WITH PYURIA: ICD-10-CM

## 2025-01-11 DIAGNOSIS — R82.81 BACTERIURIA WITH PYURIA: ICD-10-CM

## 2025-01-11 LAB
APPEARANCE UR: CLEAR
BACTERIA #/AREA URNS AUTO: ABNORMAL /HPF
BILIRUB UR STRIP.AUTO-MCNC: NEGATIVE MG/DL
CLUE CELLS SPEC QL WET PREP: NORMAL
COLOR UR: ABNORMAL
GLUCOSE UR STRIP.AUTO-MCNC: NORMAL MG/DL
HCV AB SER QL: NONREACTIVE
HIV 1+2 AB+HIV1 P24 AG SERPL QL IA: NONREACTIVE
KETONES UR STRIP.AUTO-MCNC: NEGATIVE MG/DL
LEUKOCYTE ESTERASE UR QL STRIP.AUTO: ABNORMAL
MUCOUS THREADS #/AREA URNS AUTO: ABNORMAL /LPF
NITRITE UR QL STRIP.AUTO: NEGATIVE
PH UR STRIP.AUTO: 6.5 [PH]
PREGNANCY TEST URINE, POC: NEGATIVE
PROT UR STRIP.AUTO-MCNC: ABNORMAL MG/DL
RBC # UR STRIP.AUTO: ABNORMAL /UL
RBC #/AREA URNS AUTO: ABNORMAL /HPF
SP GR UR STRIP.AUTO: 1.02
SQUAMOUS #/AREA URNS AUTO: ABNORMAL /HPF
T VAGINALIS SPEC QL WET PREP: NORMAL
UROBILINOGEN UR STRIP.AUTO-MCNC: NORMAL MG/DL
WBC #/AREA URNS AUTO: ABNORMAL /HPF
WBC VAG QL WET PREP: NORMAL
YEAST VAG QL WET PREP: NORMAL

## 2025-01-11 PROCEDURE — 99284 EMERGENCY DEPT VISIT MOD MDM: CPT

## 2025-01-11 PROCEDURE — 99283 EMERGENCY DEPT VISIT LOW MDM: CPT

## 2025-01-11 PROCEDURE — 87210 SMEAR WET MOUNT SALINE/INK: CPT

## 2025-01-11 PROCEDURE — 87491 CHLMYD TRACH DNA AMP PROBE: CPT

## 2025-01-11 PROCEDURE — 87086 URINE CULTURE/COLONY COUNT: CPT

## 2025-01-11 PROCEDURE — 86803 HEPATITIS C AB TEST: CPT

## 2025-01-11 PROCEDURE — 87389 HIV-1 AG W/HIV-1&-2 AB AG IA: CPT

## 2025-01-11 PROCEDURE — 86780 TREPONEMA PALLIDUM: CPT

## 2025-01-11 PROCEDURE — 36415 COLL VENOUS BLD VENIPUNCTURE: CPT

## 2025-01-11 PROCEDURE — 81001 URINALYSIS AUTO W/SCOPE: CPT

## 2025-01-11 PROCEDURE — 81025 URINE PREGNANCY TEST: CPT

## 2025-01-11 RX ORDER — ACETAMINOPHEN 500 MG
500 TABLET ORAL EVERY 6 HOURS PRN
Qty: 28 TABLET | Refills: 0 | Status: SHIPPED | OUTPATIENT
Start: 2025-01-11 | End: 2025-01-18

## 2025-01-11 RX ORDER — NITROFURANTOIN 25; 75 MG/1; MG/1
100 CAPSULE ORAL 2 TIMES DAILY
Qty: 10 CAPSULE | Refills: 0 | Status: SHIPPED | OUTPATIENT
Start: 2025-01-11 | End: 2025-01-16

## 2025-01-12 LAB
C TRACH RRNA SPEC QL NAA+PROBE: NEGATIVE
HOLD SPECIMEN: NORMAL
N GONORRHOEA DNA SPEC QL PROBE+SIG AMP: NEGATIVE
TREPONEMA PALLIDUM IGG+IGM AB [PRESENCE] IN SERUM OR PLASMA BY IMMUNOASSAY: NONREACTIVE

## 2025-01-12 NOTE — ED PROVIDER NOTES
"HPI   Chief Complaint   Patient presents with    Vaginal Discharge   This is a 29-year-old female with a past medical history significant for hypertension, PE on blood thinners, IBS, asthma and obesity who presents to the ED with pelvic pain.  Patient states that she has been experiencing intermittent episodes of sharp pain in her pelvis for the past 10 days.  She reports that her symptoms come and go on their own, denies any identifiable aggravating or alleviating factors. She states that her \"vagina warms up the rest of my body\".  Patient is not having any pain currently.  She reports that she has had similar pain in the past with UTIs.  She denies urinary frequency, urgency, dysuria or hematuria.   She also endorses a mucousy vaginal discharge, denies foul odor, pruritus or burning in her vagina.  She is requesting STD testing.  She states that she has been with the same partner for a long time, however she states \"you never know\".  She reports that she had a miscarriage last November and had to undergo a D&C and is concerned that she has something wrong in her uterus.     Limitations to history: None  Independent Historians: Patient  External Records Reviewed: None    Patient History   Past Medical History:   Diagnosis Date    Abdominal pain 07/25/2024    Comment on above: ABD PAIN     ABDOMINAL PAIN      Asthma     Bronchitis     Candidiasis of vagina 07/25/2024    Essential (primary) hypertension 10/31/2014    Elevated BP    History of IBS     Hx of chlamydia infection     Hx of pulmonary embolus     Morbid (severe) obesity due to excess calories (Multi) 12/16/2019    Morbid obesity with BMI of 50.0-59.9, adult    Pre-eclampsia 02/13/2020    Pulmonary embolism 03/04/2021     Past Surgical History:   Procedure Laterality Date    CHOLECYSTECTOMY      GALLBLADDER SURGERY  03/15/2018    Gallbladder Surgery     Family History   Problem Relation Name Age of Onset    Heart failure Mother      Coronary artery disease " Mother      Hypertension Mother      Deep vein thrombosis Mother      Seizures Mother      COPD Mother      Thyroid disease Mother      Pulmonary embolism Mother      Colon cancer Maternal Grandmother      Heart failure Maternal Grandfather      Hypertension Maternal Grandfather      COPD Maternal Grandfather      Diabetes Paternal Grandmother      Breast cancer Other       Social History     Tobacco Use    Smoking status: Never    Smokeless tobacco: Never   Vaping Use    Vaping status: Never Used   Substance Use Topics    Alcohol use: Not Currently     Alcohol/week: 1.0 - 2.0 standard drink of alcohol     Types: 1 - 2 Glasses of wine per week    Drug use: Not Currently     Types: Marijuana       Physical Exam   ED Triage Vitals [01/11/25 1906]   Temperature Heart Rate Respirations BP   36.3 °C (97.3 °F) 92 20 (!) 158/93      Pulse Ox Temp Source Heart Rate Source Patient Position   100 % Tympanic Monitor Sitting      BP Location FiO2 (%)     Right arm --         Physical Exam  Exam conducted with a chaperone present.   Constitutional:       General: She is not in acute distress.     Appearance: She is not ill-appearing or diaphoretic.   Cardiovascular:      Rate and Rhythm: Normal rate and regular rhythm.      Heart sounds: Normal heart sounds.   Pulmonary:      Effort: Pulmonary effort is normal. No respiratory distress.      Breath sounds: Normal breath sounds.   Abdominal:      General: Bowel sounds are normal.      Palpations: Abdomen is soft.      Tenderness: There is no abdominal tenderness. There is no right CVA tenderness, left CVA tenderness, guarding or rebound.   Genitourinary:     Vagina: No signs of injury. Vaginal discharge present. No erythema, tenderness or bleeding.      Cervix: Normal. No cervical motion tenderness, friability, lesion, erythema or cervical bleeding.      Uterus: Normal. Not deviated, not enlarged, not tender and no uterine prolapse.       Adnexa: Right adnexa normal and left  adnexa normal.        Right: No mass or tenderness.          Left: No mass or tenderness.        Comments: Thick whitish, greenish discharge present in the vaginal vault.  Foul odor is noted.  Cervical os is closed with IUD strings present.  Skin:     General: Skin is warm and dry.   Neurological:      General: No focal deficit present.      Mental Status: She is alert and oriented to person, place, and time.         ED Course & MDM   Diagnoses as of 01/12/25 0309   Female pelvic pain   Bacteriuria with pyuria         Medical Decision Making  This is a 29-year-old female with a past medical history significant for hypertension, PE on blood thinners, IBS, asthma and obesity who presents to the ED with vaginal discharge.  Patient states that she has been experiencing intermittent episodes of sharp pain in her pelvis for the past 10 days.      On physical exam, patient is overall well-appearing and in no acute distress.  Abdomen is soft, nondistended with normal bowel sounds.  Patient is able to tolerate deep palpation throughout.  There is no guarding, rebound or evidence of peritonitis. On pelvic exam, there is a thick whitish, greenish discharge present in the vaginal vault.  A foul odor is noted.  Cervical os is closed with IUD strings present.  No cervical motion tenderness, erythema, lesions, friability or bleeding.  Uterus is normal without deviation, enlargement, tenderness or prolapse.  No adnexal masses or tenderness.     POCT urine pregnancy test is negative.  Urinalysis shows pyuria, bacteriuria with significant leukocyte esterase.  Wet prep does not show any trichomonas, clue cells or yeast. HIV and hepatitis C screening are negative.      Upon reassessment, patient states that she is still pain-free. She reports that she has had similar pelvic pain in the past when she had UTIs.  Offered her a transvaginal ultrasound to rule out acute pelvic pathology, she declined stating she does not wish to wait in the  ED any longer. There is low suspicion for torsion given patient's benign abdominal and pelvic exams and overall stable clinic picture.  She states that she would like to be treated for UTI and discharged.  Prescribed Macrobid.  She was informed that she will be notified of any positive STD results within the next 24-48 hours. Counseled patient to follow-up with GYN, placed an outpatient referral.  Discussed ED return precautions.  Patient verbalized understanding was agreeable to plan of care.  Discharged stable condition.       Labs Reviewed   URINALYSIS WITH REFLEX CULTURE AND MICROSCOPIC - Abnormal       Result Value    Color, Urine Light-Yellow      Appearance, Urine Clear      Specific Gravity, Urine 1.020      pH, Urine 6.5      Protein, Urine 10 (TRACE)      Glucose, Urine Normal      Blood, Urine 0.1 (1+) (*)     Ketones, Urine NEGATIVE      Bilirubin, Urine NEGATIVE      Urobilinogen, Urine Normal      Nitrite, Urine NEGATIVE      Leukocyte Esterase, Urine 500 Xin/uL (*)    MICROSCOPIC ONLY, URINE - Abnormal    WBC, Urine 21-50 (*)     RBC, Urine 3-5      Squamous Epithelial Cells, Urine 26-50 (1+)      Bacteria, Urine 1+ (*)     Mucus, Urine FEW     HIV 1/2 ANTIGEN/ANTIBODY SCREEN WIH REFLEX TO CONFIRMATION - Normal    HIV 1/2 Antigen/Antibody Screen with Reflex to Confirmation Nonreactive      Narrative:     HIV Ag/Ab screen is performed using the [employer] HIV Ag/Ab Combo assay which detects the presence of HIV p24 antigen as well as antibodies to HIV-1 (Group M and O) and HIV-2.  No laboratory evidence of HIV infection. If acute HIV infection is suspected, consider testing for HIV RNA by PCR (viral load).   HEPATITIS C ANTIBODY - Normal    Hepatitis C AB Nonreactive     POCT PREGNANCY, URINE - Normal    Preg Test, Ur Negative     URINE CULTURE   WET PREP, GENITAL    Trichomonas None Seen      Clue Cells None Seen      Yeast None Seen      WBC 21-50     URINALYSIS WITH REFLEX CULTURE AND  MICROSCOPIC    Narrative:     The following orders were created for panel order Urinalysis with Reflex Culture and Microscopic.  Procedure                               Abnormality         Status                     ---------                               -----------         ------                     Urinalysis with Reflex C...[[id number]]  Abnormal            Final result               Extra Urine Gray Tube[[id number]]                            In process                   Please view results for these tests on the individual orders.   SYPHILIS SCREENING WITH REFLEX   C. TRACHOMATIS + N. GONORRHOEAE, AMPLIFIED   EXTRA URINE GRAY TUBE             Vivi Grijalva PA-C  01/12/25 0314

## 2025-01-12 NOTE — ED TRIAGE NOTES
Pt presents to the ED with complaints of vaginal discharge and pain that as been on going for about a week. Pt states that she also wants to be tested for STD's. Pt states she was here almost a week ago for the same complaint and she had no UTI or STD's but she feels like her symptoms are not getting any better or improving which is what brought her back to the ED

## 2025-01-13 ENCOUNTER — OFFICE VISIT (OUTPATIENT)
Dept: GASTROENTEROLOGY | Facility: HOSPITAL | Age: 30
End: 2025-01-13
Payer: COMMERCIAL

## 2025-01-13 VITALS
HEART RATE: 106 BPM | SYSTOLIC BLOOD PRESSURE: 124 MMHG | DIASTOLIC BLOOD PRESSURE: 84 MMHG | WEIGHT: 285 LBS | BODY MASS INDEX: 50.49 KG/M2 | TEMPERATURE: 97.7 F | OXYGEN SATURATION: 98 %

## 2025-01-13 DIAGNOSIS — R42 LIGHTHEADEDNESS: ICD-10-CM

## 2025-01-13 DIAGNOSIS — R10.9 ABDOMINAL PAIN, UNSPECIFIED ABDOMINAL LOCATION: ICD-10-CM

## 2025-01-13 DIAGNOSIS — R11.0 NAUSEA: Primary | ICD-10-CM

## 2025-01-13 LAB — BACTERIA UR CULT: NORMAL

## 2025-01-13 PROCEDURE — 99214 OFFICE O/P EST MOD 30 MIN: CPT | Performed by: NURSE PRACTITIONER

## 2025-01-13 PROCEDURE — 3074F SYST BP LT 130 MM HG: CPT | Performed by: NURSE PRACTITIONER

## 2025-01-13 PROCEDURE — 3079F DIAST BP 80-89 MM HG: CPT | Performed by: NURSE PRACTITIONER

## 2025-01-13 RX ORDER — OMEPRAZOLE 40 MG/1
40 CAPSULE, DELAYED RELEASE ORAL DAILY
Qty: 90 CAPSULE | Refills: 3 | Status: SHIPPED | OUTPATIENT
Start: 2025-01-13 | End: 2026-01-13

## 2025-01-13 ASSESSMENT — PAIN SCALES - GENERAL: PAINLEVEL_OUTOF10: 7

## 2025-01-13 ASSESSMENT — ENCOUNTER SYMPTOMS
LOSS OF SENSATION IN FEET: 0
DEPRESSION: 0
OCCASIONAL FEELINGS OF UNSTEADINESS: 0

## 2025-01-13 NOTE — PATIENT INSTRUCTIONS
Recommendations  Start omeprazole 1 cap in AM on empty stomach for 4-8 weeks.  Obtain stool tests to check for H. Pylori infection and inflammation.  Obtain gastric emptying study to exclude gastroparesis.   Follow up Neurologist about being tested for POTS.  Follow up with GYN due to your birth control possibly causing symptoms.  Follow up in 3 months.

## 2025-01-23 ENCOUNTER — LAB (OUTPATIENT)
Dept: LAB | Facility: LAB | Age: 30
End: 2025-01-23
Payer: COMMERCIAL

## 2025-01-23 DIAGNOSIS — R11.0 NAUSEA: ICD-10-CM

## 2025-01-23 DIAGNOSIS — R10.9 ABDOMINAL PAIN, UNSPECIFIED ABDOMINAL LOCATION: ICD-10-CM

## 2025-01-23 PROCEDURE — 87449 NOS EACH ORGANISM AG IA: CPT

## 2025-01-23 PROCEDURE — 83993 ASSAY FOR CALPROTECTIN FECAL: CPT

## 2025-01-26 LAB — H PYLORI AG STL QL IA: NEGATIVE

## 2025-01-28 LAB — CALPROTECTIN STL-MCNT: 43 UG/G

## 2025-02-03 ENCOUNTER — TELEPHONE (OUTPATIENT)
Dept: PULMONOLOGY | Facility: HOSPITAL | Age: 30
End: 2025-02-03
Payer: COMMERCIAL

## 2025-02-10 ENCOUNTER — APPOINTMENT (OUTPATIENT)
Dept: OBSTETRICS AND GYNECOLOGY | Facility: CLINIC | Age: 30
End: 2025-02-10
Payer: COMMERCIAL

## 2025-02-23 ENCOUNTER — HOSPITAL ENCOUNTER (EMERGENCY)
Facility: HOSPITAL | Age: 30
Discharge: HOME | End: 2025-02-24
Attending: EMERGENCY MEDICINE
Payer: COMMERCIAL

## 2025-02-23 VITALS
WEIGHT: 285 LBS | HEIGHT: 63 IN | TEMPERATURE: 97.2 F | BODY MASS INDEX: 50.5 KG/M2 | SYSTOLIC BLOOD PRESSURE: 114 MMHG | RESPIRATION RATE: 18 BRPM | HEART RATE: 95 BPM | OXYGEN SATURATION: 100 % | DIASTOLIC BLOOD PRESSURE: 82 MMHG

## 2025-02-23 DIAGNOSIS — R42 DIZZINESS: Primary | ICD-10-CM

## 2025-02-23 DIAGNOSIS — R10.30 LOWER ABDOMINAL PAIN: ICD-10-CM

## 2025-02-23 DIAGNOSIS — N93.9 VAGINAL BLEEDING: ICD-10-CM

## 2025-02-23 DIAGNOSIS — N39.0 UTI (URINARY TRACT INFECTION), UNCOMPLICATED: ICD-10-CM

## 2025-02-23 LAB
ALBUMIN SERPL BCP-MCNC: 4.1 G/DL (ref 3.4–5)
ALP SERPL-CCNC: 70 U/L (ref 33–110)
ALT SERPL W P-5'-P-CCNC: 10 U/L (ref 7–45)
ANION GAP SERPL CALC-SCNC: 13 MMOL/L (ref 10–20)
AST SERPL W P-5'-P-CCNC: 10 U/L (ref 9–39)
BASOPHILS # BLD AUTO: 0.04 X10*3/UL (ref 0–0.1)
BASOPHILS NFR BLD AUTO: 0.4 %
BILIRUB SERPL-MCNC: 0.4 MG/DL (ref 0–1.2)
BUN SERPL-MCNC: 6 MG/DL (ref 6–23)
CALCIUM SERPL-MCNC: 9.5 MG/DL (ref 8.6–10.6)
CHLORIDE SERPL-SCNC: 101 MMOL/L (ref 98–107)
CO2 SERPL-SCNC: 26 MMOL/L (ref 21–32)
CREAT SERPL-MCNC: 0.56 MG/DL (ref 0.5–1.05)
EGFRCR SERPLBLD CKD-EPI 2021: >90 ML/MIN/1.73M*2
EOSINOPHIL # BLD AUTO: 0.24 X10*3/UL (ref 0–0.7)
EOSINOPHIL NFR BLD AUTO: 2.1 %
ERYTHROCYTE [DISTWIDTH] IN BLOOD BY AUTOMATED COUNT: 14.9 % (ref 11.5–14.5)
FLUAV RNA RESP QL NAA+PROBE: NOT DETECTED
FLUBV RNA RESP QL NAA+PROBE: NOT DETECTED
GLUCOSE SERPL-MCNC: 83 MG/DL (ref 74–99)
HCT VFR BLD AUTO: 38.9 % (ref 36–46)
HGB BLD-MCNC: 12.8 G/DL (ref 12–16)
IMM GRANULOCYTES # BLD AUTO: 0.02 X10*3/UL (ref 0–0.7)
IMM GRANULOCYTES NFR BLD AUTO: 0.2 % (ref 0–0.9)
LIPASE SERPL-CCNC: 22 U/L (ref 9–82)
LYMPHOCYTES # BLD AUTO: 2.16 X10*3/UL (ref 1.2–4.8)
LYMPHOCYTES NFR BLD AUTO: 19.3 %
MCH RBC QN AUTO: 26.1 PG (ref 26–34)
MCHC RBC AUTO-ENTMCNC: 32.9 G/DL (ref 32–36)
MCV RBC AUTO: 79 FL (ref 80–100)
MONOCYTES # BLD AUTO: 0.62 X10*3/UL (ref 0.1–1)
MONOCYTES NFR BLD AUTO: 5.5 %
NEUTROPHILS # BLD AUTO: 8.13 X10*3/UL (ref 1.2–7.7)
NEUTROPHILS NFR BLD AUTO: 72.5 %
NRBC BLD-RTO: 0 /100 WBCS (ref 0–0)
PLATELET # BLD AUTO: 413 X10*3/UL (ref 150–450)
POTASSIUM SERPL-SCNC: 3.7 MMOL/L (ref 3.5–5.3)
PROT SERPL-MCNC: 8.3 G/DL (ref 6.4–8.2)
RBC # BLD AUTO: 4.91 X10*6/UL (ref 4–5.2)
SARS-COV-2 RNA RESP QL NAA+PROBE: NOT DETECTED
SODIUM SERPL-SCNC: 136 MMOL/L (ref 136–145)
WBC # BLD AUTO: 11.2 X10*3/UL (ref 4.4–11.3)

## 2025-02-23 PROCEDURE — 36415 COLL VENOUS BLD VENIPUNCTURE: CPT

## 2025-02-23 PROCEDURE — 83690 ASSAY OF LIPASE: CPT

## 2025-02-23 PROCEDURE — 87636 SARSCOV2 & INF A&B AMP PRB: CPT

## 2025-02-23 PROCEDURE — 80053 COMPREHEN METABOLIC PANEL: CPT

## 2025-02-23 PROCEDURE — 96360 HYDRATION IV INFUSION INIT: CPT

## 2025-02-23 PROCEDURE — 87491 CHLMYD TRACH DNA AMP PROBE: CPT

## 2025-02-23 PROCEDURE — 81001 URINALYSIS AUTO W/SCOPE: CPT

## 2025-02-23 PROCEDURE — 87086 URINE CULTURE/COLONY COUNT: CPT

## 2025-02-23 PROCEDURE — 2500000001 HC RX 250 WO HCPCS SELF ADMINISTERED DRUGS (ALT 637 FOR MEDICARE OP): Mod: SE

## 2025-02-23 PROCEDURE — 99284 EMERGENCY DEPT VISIT MOD MDM: CPT | Performed by: EMERGENCY MEDICINE

## 2025-02-23 PROCEDURE — 87210 SMEAR WET MOUNT SALINE/INK: CPT

## 2025-02-23 PROCEDURE — 86803 HEPATITIS C AB TEST: CPT

## 2025-02-23 PROCEDURE — 99284 EMERGENCY DEPT VISIT MOD MDM: CPT | Mod: 25 | Performed by: EMERGENCY MEDICINE

## 2025-02-23 PROCEDURE — 87661 TRICHOMONAS VAGINALIS AMPLIF: CPT

## 2025-02-23 PROCEDURE — 86780 TREPONEMA PALLIDUM: CPT

## 2025-02-23 PROCEDURE — 2500000004 HC RX 250 GENERAL PHARMACY W/ HCPCS (ALT 636 FOR OP/ED): Mod: SE

## 2025-02-23 PROCEDURE — 87389 HIV-1 AG W/HIV-1&-2 AB AG IA: CPT

## 2025-02-23 PROCEDURE — 96361 HYDRATE IV INFUSION ADD-ON: CPT

## 2025-02-23 PROCEDURE — 87591 N.GONORRHOEAE DNA AMP PROB: CPT

## 2025-02-23 PROCEDURE — 85025 COMPLETE CBC W/AUTO DIFF WBC: CPT

## 2025-02-23 RX ORDER — ONDANSETRON 4 MG/1
4 TABLET, ORALLY DISINTEGRATING ORAL ONCE
Status: COMPLETED | OUTPATIENT
Start: 2025-02-23 | End: 2025-02-23

## 2025-02-23 RX ORDER — ONDANSETRON HYDROCHLORIDE 2 MG/ML
4 INJECTION, SOLUTION INTRAVENOUS ONCE
Status: DISCONTINUED | OUTPATIENT
Start: 2025-02-23 | End: 2025-02-23

## 2025-02-23 RX ORDER — ACETAMINOPHEN 325 MG/1
650 TABLET ORAL ONCE
Status: COMPLETED | OUTPATIENT
Start: 2025-02-23 | End: 2025-02-23

## 2025-02-23 RX ADMIN — ACETAMINOPHEN 650 MG: 325 TABLET ORAL at 21:57

## 2025-02-23 RX ADMIN — ONDANSETRON 4 MG: 4 TABLET, ORALLY DISINTEGRATING ORAL at 21:57

## 2025-02-23 RX ADMIN — SODIUM CHLORIDE, POTASSIUM CHLORIDE, SODIUM LACTATE AND CALCIUM CHLORIDE 1000 ML: 600; 310; 30; 20 INJECTION, SOLUTION INTRAVENOUS at 21:58

## 2025-02-23 ASSESSMENT — PAIN DESCRIPTION - LOCATION: LOCATION: ABDOMEN

## 2025-02-23 ASSESSMENT — PAIN DESCRIPTION - PAIN TYPE: TYPE: ACUTE PAIN

## 2025-02-23 ASSESSMENT — PAIN - FUNCTIONAL ASSESSMENT: PAIN_FUNCTIONAL_ASSESSMENT: 0-10

## 2025-02-23 ASSESSMENT — PAIN SCALES - GENERAL: PAINLEVEL_OUTOF10: 5 - MODERATE PAIN

## 2025-02-24 LAB
APPEARANCE UR: ABNORMAL
BILIRUB UR STRIP.AUTO-MCNC: NEGATIVE MG/DL
C TRACH RRNA SPEC QL NAA+PROBE: NEGATIVE
CLUE CELLS SPEC QL WET PREP: NORMAL
COLOR UR: ABNORMAL
GLUCOSE UR STRIP.AUTO-MCNC: NORMAL MG/DL
HCV AB SER QL: NONREACTIVE
HIV 1+2 AB+HIV1 P24 AG SERPL QL IA: NONREACTIVE
HOLD SPECIMEN: NORMAL
KETONES UR STRIP.AUTO-MCNC: NEGATIVE MG/DL
LEUKOCYTE ESTERASE UR QL STRIP.AUTO: ABNORMAL
MUCOUS THREADS #/AREA URNS AUTO: ABNORMAL /LPF
N GONORRHOEA DNA SPEC QL PROBE+SIG AMP: NEGATIVE
NITRITE UR QL STRIP.AUTO: NEGATIVE
PH UR STRIP.AUTO: 7 [PH]
PROT UR STRIP.AUTO-MCNC: NEGATIVE MG/DL
RBC # UR STRIP.AUTO: NEGATIVE MG/DL
RBC #/AREA URNS AUTO: ABNORMAL /HPF
SP GR UR STRIP.AUTO: 1.02
SQUAMOUS #/AREA URNS AUTO: ABNORMAL /HPF
T VAGINALIS RRNA SPEC QL NAA+PROBE: NEGATIVE
T VAGINALIS SPEC QL WET PREP: NORMAL
TREPONEMA PALLIDUM IGG+IGM AB [PRESENCE] IN SERUM OR PLASMA BY IMMUNOASSAY: NONREACTIVE
TRICHOMONAS REFLEX COMMENT: NORMAL
UROBILINOGEN UR STRIP.AUTO-MCNC: NORMAL MG/DL
WBC #/AREA URNS AUTO: ABNORMAL /HPF
WBC VAG QL WET PREP: NORMAL
YEAST VAG QL WET PREP: NORMAL

## 2025-02-24 RX ORDER — NITROFURANTOIN 25; 75 MG/1; MG/1
100 CAPSULE ORAL 2 TIMES DAILY
Qty: 10 CAPSULE | Refills: 0 | Status: SHIPPED | OUTPATIENT
Start: 2025-02-24 | End: 2025-03-01

## 2025-02-24 NOTE — ED TRIAGE NOTES
Pt presents to the ED d/t dizziness, abdominal pain, and nausea. Pt also noticed some vaginal bleeding and is concerned because mirena ring in place. Pt stated this started a couple days ago. Pt is AxOx4/VSS.

## 2025-02-24 NOTE — ED PROVIDER NOTES
HPI   Chief Complaint   Patient presents with    Dizziness    Abdominal Pain    Vaginal Bleeding       HPI  Patient is an 30-year-old female with a past medical history of HTN, IBS, asthma, PE on blood thinners, and obesity who reports to the emergency department for concerns of abdominal pain, vaginal bleeding, and dizziness.  Patient states that she has felt ill over the past 2 days with lack of appetite and nausea.  Patient states that she has also had symptoms of periodic dizziness and weakness over the past couple months.  Patient states that she has seen a GI doctor and has a prescription that she is present  tomorrow.  Patient states that they stated that there was some concern for POTS syndrome and she has an appointment set up in March to see a neurologist for further evaluation for that.  Patient also states that she has had some vaginal bleeding, which she is not supposed to have a she has a Jaime IUD in place.  Patient states he has some lower abdominal pain and is concerned for a UTI.  Patient denies current chest pain, shortness of breath, vomiting, change in sensation, fevers, headaches.      Patient History   Past Medical History:   Diagnosis Date    Abdominal pain 07/25/2024    Comment on above: ABD PAIN     ABDOMINAL PAIN      Asthma     Bronchitis     Candidiasis of vagina 07/25/2024    Essential (primary) hypertension 10/31/2014    Elevated BP    History of IBS     Hx of chlamydia infection     Hx of pulmonary embolus     Morbid (severe) obesity due to excess calories (Multi) 12/16/2019    Morbid obesity with BMI of 50.0-59.9, adult    Pre-eclampsia 02/13/2020    Pulmonary embolism 03/04/2021     Past Surgical History:   Procedure Laterality Date    CHOLECYSTECTOMY      GALLBLADDER SURGERY  03/15/2018    Gallbladder Surgery     Family History   Problem Relation Name Age of Onset    Heart failure Mother      Coronary artery disease Mother      Hypertension Mother      Deep vein thrombosis  Mother      Seizures Mother      COPD Mother      Thyroid disease Mother      Pulmonary embolism Mother      Colon cancer Maternal Grandmother      Heart failure Maternal Grandfather      Hypertension Maternal Grandfather      COPD Maternal Grandfather      Diabetes Paternal Grandmother      Breast cancer Other       Social History     Tobacco Use    Smoking status: Never    Smokeless tobacco: Never   Vaping Use    Vaping status: Never Used   Substance Use Topics    Alcohol use: Not Currently     Alcohol/week: 1.0 - 2.0 standard drink of alcohol     Types: 1 - 2 Glasses of wine per week    Drug use: Not Currently     Types: Marijuana       Physical Exam   ED Triage Vitals [02/23/25 1959]   Temperature Heart Rate Respirations BP   36.2 °C (97.2 °F) 95 18 114/82      Pulse Ox Temp Source Heart Rate Source Patient Position   100 % Temporal Monitor Sitting      BP Location FiO2 (%)     Right arm --       Physical Exam  Vitals and nursing note reviewed. Exam conducted with a chaperone present.   Constitutional:       General: She is not in acute distress.     Appearance: She is well-developed.   HENT:      Head: Normocephalic and atraumatic.   Eyes:      Conjunctiva/sclera: Conjunctivae normal.   Cardiovascular:      Rate and Rhythm: Normal rate and regular rhythm.      Pulses: Normal pulses.      Heart sounds: No murmur heard.  Pulmonary:      Effort: Pulmonary effort is normal. No respiratory distress.      Breath sounds: Normal breath sounds.      Comments: Lungs clear to auscultation bilaterally  Abdominal:      Palpations: Abdomen is soft.      Tenderness: There is no abdominal tenderness.      Comments: Abdomen soft and nontender to palpation   Genitourinary:     General: Normal vulva.      Exam position: Lithotomy position.      Pubic Area: No rash.       Comments: White discharge noted in the vault  Os appeared closed, but strings from IUD could not be identified due to the amount of white discharge in the  vault  No blood noted in the vault or from the cervix  Musculoskeletal:         General: No swelling.      Cervical back: Neck supple.      Right lower leg: No edema.      Left lower leg: No edema.   Skin:     General: Skin is warm and dry.      Capillary Refill: Capillary refill takes less than 2 seconds.   Neurological:      Mental Status: She is alert.   Psychiatric:         Mood and Affect: Mood normal.       ED Course & MDM   Diagnoses as of 02/24/25 0018   Dizziness   Lower abdominal pain   Vaginal bleeding   UTI (urinary tract infection), uncomplicated       Medical Decision Making  Patient is an 30-year-old female with a past medical history of HTN, IBS, asthma, PE on blood thinners, and obesity who reports to the emergency department for concerns of abdominal pain, vaginal bleeding, and dizziness.  Patient's vitals are stable and within normal is upon presentation.  Patient given Tylenol and Zofran for symptomatic control. Patient given 1 L lactated ringer bolus, after which she stated she is feeling better.  Patient states she also wanted STD testing, which was ordered.  Patient CBC showed no evidence of leukocytosis with a WBC of 11.2 and no concern for anemia with a hemoglobin of 12.8.    Patient CMP was completely within normal limits with a glucose of 83 making hypoglycemia and unlikely cause for her symptoms, as well.  Patient's lipase was 22 making pancreatitis unlikely.  Patient's COVID and influenza swabs were negative.  Upon recheck, patient stated she is feeling much better and stated that she wanted to be discharged and would follow-up with the remaining labs on [facility].  Patient was still waiting for the results of urinalysis, wet prep, and STD tests.  Patient discharged in good condition with strict return precautions.  Patient understood and was agreeable with the plan.  Patient's urinalysis resulted before patient left the emergency department and was positive for UTI.  A prescription for  Macrobid was sent to the patient's pharmacy and patient notified before leaving with updated discharge instructions.    Procedure  Procedures none     DO Marvin Chen  02/24/25 0001       DO Marvin Chen  02/24/25 0018

## 2025-02-24 NOTE — DISCHARGE INSTRUCTIONS
You are seen emergency department today for concerns of dizziness, vomiting, and vaginal bleeding.  A thorough examination was performed.  Your blood work was largely within normal limits.  As discussed, your urinalysis and STD tests were still pending prior to discharge.  The results will be available on your MyChart.  As discussed, you can take Zofran, ibuprofen, and/or Tylenol for symptomatic control, which you stated you have available to you.  You stated that you have an appointment scheduled in March with a neurologist.  It is important that you keep this appointment.    You are safely discharged at this time.  However, should you have any new, worsening, or concerning symptoms please go back to the emergency department.

## 2025-02-25 LAB — BACTERIA UR CULT: ABNORMAL

## 2025-03-10 ENCOUNTER — APPOINTMENT (OUTPATIENT)
Dept: PRIMARY CARE | Facility: CLINIC | Age: 30
End: 2025-03-10
Payer: COMMERCIAL

## 2025-03-19 ENCOUNTER — OFFICE VISIT (OUTPATIENT)
Dept: PRIMARY CARE | Facility: CLINIC | Age: 30
End: 2025-03-19
Payer: COMMERCIAL

## 2025-03-19 VITALS
HEIGHT: 63 IN | RESPIRATION RATE: 16 BRPM | OXYGEN SATURATION: 99 % | TEMPERATURE: 97.6 F | SYSTOLIC BLOOD PRESSURE: 106 MMHG | WEIGHT: 291.4 LBS | HEART RATE: 83 BPM | BODY MASS INDEX: 51.63 KG/M2 | DIASTOLIC BLOOD PRESSURE: 67 MMHG

## 2025-03-19 DIAGNOSIS — F41.9 ANXIETY: Primary | ICD-10-CM

## 2025-03-19 DIAGNOSIS — R07.89 OTHER CHEST PAIN: ICD-10-CM

## 2025-03-19 PROCEDURE — 99215 OFFICE O/P EST HI 40 MIN: CPT | Performed by: INTERNAL MEDICINE

## 2025-03-19 PROCEDURE — 1036F TOBACCO NON-USER: CPT | Performed by: INTERNAL MEDICINE

## 2025-03-19 PROCEDURE — 3078F DIAST BP <80 MM HG: CPT | Performed by: INTERNAL MEDICINE

## 2025-03-19 PROCEDURE — 3008F BODY MASS INDEX DOCD: CPT | Performed by: INTERNAL MEDICINE

## 2025-03-19 PROCEDURE — 3074F SYST BP LT 130 MM HG: CPT | Performed by: INTERNAL MEDICINE

## 2025-03-19 RX ORDER — ESCITALOPRAM OXALATE 10 MG/1
10 TABLET ORAL DAILY
Qty: 30 TABLET | Refills: 5 | Status: SHIPPED | OUTPATIENT
Start: 2025-03-19 | End: 2025-09-15

## 2025-03-19 SDOH — ECONOMIC STABILITY: FOOD INSECURITY: WITHIN THE PAST 12 MONTHS, THE FOOD YOU BOUGHT JUST DIDN'T LAST AND YOU DIDN'T HAVE MONEY TO GET MORE.: NEVER TRUE

## 2025-03-19 SDOH — ECONOMIC STABILITY: FOOD INSECURITY: WITHIN THE PAST 12 MONTHS, YOU WORRIED THAT YOUR FOOD WOULD RUN OUT BEFORE YOU GOT MONEY TO BUY MORE.: NEVER TRUE

## 2025-03-19 ASSESSMENT — ENCOUNTER SYMPTOMS
DEPRESSION: 0
OCCASIONAL FEELINGS OF UNSTEADINESS: 0
LOSS OF SENSATION IN FEET: 0

## 2025-03-19 ASSESSMENT — LIFESTYLE VARIABLES: HOW MANY STANDARD DRINKS CONTAINING ALCOHOL DO YOU HAVE ON A TYPICAL DAY: PATIENT DOES NOT DRINK

## 2025-03-19 ASSESSMENT — PAIN SCALES - GENERAL: PAINLEVEL_OUTOF10: 0-NO PAIN

## 2025-03-19 NOTE — PROGRESS NOTES
Subjective   Shefali Arango is a 30 y.o. female w/ pmhx of Asthma, DVT/PE (s/p eliquis at age of 26), IBS, HTN who presents for Follow-up.    HPI     Previous History:   Patient initially presented on 6/2024 to establish care. During this visit she reported pre-syncopal symptoms. Since then she has had multiple ED visits. 6/2024 she was hospitalized  for community acquired pneumonia. During this visit she reported dizziness/headache and for which MRI/MRV was ordered but, was unremarkable. Neurology was consulted and felt that her symptoms were related to migraines. She ultimately was referred to audiology and vestibular rehab for her symptoms. She then represented on 7/20 to ED for dizziness. Labwork was unremarkable.    Present History:   Patient report that she is still having dizziness, lightlessness and N/V. She reports that she has these symptoms happened randomly but, mostly occur when straining while using the bathroom and ocassionaly when she stands upright. She reports that she has been hydrating well at home.  Patient denied any syncopal event or any recent falls. She also reported that she has been having intermittent diarrhea for the past 2-3 months. She recently had a miscarriage in 11/2024 and had D&C suction and mirena IUD placement during procedure. She reports occasional abdominal cramp since procedure. Patient also reporting increasing worry and stress about  her health for the past 6 months.       Past Medical History:   Diagnosis Date    Abdominal pain 07/25/2024    Comment on above: ABD PAIN     ABDOMINAL PAIN      Asthma     Bronchitis     Candidiasis of vagina 07/25/2024    Essential (primary) hypertension 10/31/2014    Elevated BP    History of IBS     Hx of chlamydia infection     Hx of pulmonary embolus     Morbid (severe) obesity due to excess calories (Multi) 12/16/2019    Morbid obesity with BMI of 50.0-59.9, adult    Pre-eclampsia 02/13/2020    Pulmonary embolism 03/04/2021        Past Surgical History:   Procedure Laterality Date    CHOLECYSTECTOMY      GALLBLADDER SURGERY  03/15/2018    Gallbladder Surgery     Current Outpatient Medications   Medication Instructions    alcohol swabs pads, medicated Use 1, up to 5 times a day    cholecalciferol (VITAMIN D-3) 50 mcg, oral, Daily    escitalopram (LEXAPRO) 10 mg, oral, Daily    ferrous sulfate 325 mg, oral, Daily with breakfast    fluticasone (Flonase) 50 mcg/actuation nasal spray 1 spray, Each Nostril, Daily, Shake gently. Before first use, prime pump. After use, clean tip and replace cap.    fluticasone (Flovent HFA) 110 mcg/actuation inhaler 1 puff, inhalation, 2 times daily RT, Rinse mouth with water after use to reduce aftertaste and incidence of candidiasis. Do not swallow.    omeprazole (PRILOSEC) 40 mg, oral, Daily, Do not crush or chew.    peak flow meter device 1 kit, miscellaneous, Daily    prenatal vit 14-iron fum-folic 29 mg iron- 1 mg tablet,chewable 1 Units, oral, Daily    sodium chloride (Ocean) 0.65 % nasal spray 1 spray, Each Nostril, As needed     Allergies   Allergen Reactions    Ketorolac Other     Other reaction(s): Intolerance    Prochlorperazine Other     Gets restless/anxious    Tramadol Other     Makes her feel like shes having an anxiety attack     Social History     Tobacco Use    Smoking status: Never    Smokeless tobacco: Never   Vaping Use    Vaping status: Never Used   Substance Use Topics    Alcohol use: Not Currently     Alcohol/week: 1.0 - 2.0 standard drink of alcohol     Types: 1 - 2 Glasses of wine per week    Drug use: Not Currently     Types: Marijuana     Family History   Problem Relation Name Age of Onset    Heart failure Mother      Coronary artery disease Mother      Hypertension Mother      Deep vein thrombosis Mother      Seizures Mother      COPD Mother      Thyroid disease Mother      Pulmonary embolism Mother      Colon cancer Maternal Grandmother      Heart failure Maternal Grandfather    "   Hypertension Maternal Grandfather      COPD Maternal Grandfather      Diabetes Paternal Grandmother      Breast cancer Other           Review of Systems   All other systems reviewed and are negative.      Objective   /67   Pulse 83   Temp 36.4 °C (97.6 °F) (Temporal)   Resp 16   Ht 1.6 m (5' 3\")   Wt 132 kg (291 lb 6.4 oz)   SpO2 99%   BMI 51.62 kg/m²     Physical Exam  HENT:      Head: Atraumatic.      Mouth/Throat:      Mouth: Mucous membranes are moist.      Pharynx: Oropharynx is clear.   Eyes:      Extraocular Movements: Extraocular movements intact.      Pupils: Pupils are equal, round, and reactive to light.   Cardiovascular:      Rate and Rhythm: Normal rate and regular rhythm.   Pulmonary:      Effort: Pulmonary effort is normal.      Breath sounds: Normal breath sounds. No wheezing, rhonchi or rales.   Abdominal:      General: Abdomen is flat. Bowel sounds are normal. There is no distension.      Palpations: Abdomen is soft.      Tenderness: There is no abdominal tenderness. There is no rebound.   Skin:     General: Skin is warm and dry.   Neurological:      General: No focal deficit present.      Mental Status: She is oriented to person, place, and time. Mental status is at baseline.         Assessment/Plan   Presyncopal symptoms  N/V  :: Possible POTS vs other dysautonomia   ::CT-A/P (6/2024)- no abdominopelvic findings  ::MRI/MRV(6/2024)- negative  -Pending gastric emptying study with GI  -Neurology appt upcoming  -C/w Zofran    Anxiety  -Started Lexapro 10 daily    IUD   Abdominal pain  -Patient reporting pain after IUD placement, patient to discuss this OBGYN     Asthma  -PFTs pending  -C/w Asthma PRN    Obesity  -Check A1c, lipids at next visit    Possible Sleep Apnea  -STOPBANG 4, Sleep Study pending    STI screening  STI screening recently completed, will defer    Health maintenance:    Pap smear (-) 5/2024, repeat in 3 years  Mammogram- Aunt had breast cancer around dx  around 50 " y/o, will discuss at 40  Colonocopy- Grandmother colon cancer dx around 60's, will start at 45    RTC in 6 months

## 2025-03-24 PROBLEM — Z3A.11 11 WEEKS GESTATION OF PREGNANCY (HHS-HCC): Status: RESOLVED | Noted: 2024-10-14 | Resolved: 2025-03-24

## 2025-03-24 PROBLEM — J45.909 ASTHMA: Status: RESOLVED | Noted: 2024-05-10 | Resolved: 2025-03-24

## 2025-03-24 PROBLEM — O99.519 MATERNAL ASTHMA COMPLICATING PREGNANCY (HHS-HCC): Status: RESOLVED | Noted: 2024-07-25 | Resolved: 2025-03-24

## 2025-03-24 PROBLEM — Z86.711 HISTORY OF PULMONARY EMBOLUS DURING PREGNANCY: Status: RESOLVED | Noted: 2024-07-25 | Resolved: 2025-03-24

## 2025-03-24 PROBLEM — O26.899 RH NEGATIVE STATE IN ANTEPARTUM PERIOD (HHS-HCC): Status: RESOLVED | Noted: 2024-10-15 | Resolved: 2025-03-24

## 2025-03-24 PROBLEM — O10.011 PRE-EXISTING ESSENTIAL HYPERTENSION DURING PREGNANCY IN FIRST TRIMESTER (HHS-HCC): Status: RESOLVED | Noted: 2024-10-31 | Resolved: 2025-03-24

## 2025-03-24 PROBLEM — F41.9 ANXIETY: Status: RESOLVED | Noted: 2025-03-19 | Resolved: 2025-03-24

## 2025-03-24 PROBLEM — K58.9 IRRITABLE BOWEL SYNDROME: Status: RESOLVED | Noted: 2024-05-10 | Resolved: 2025-03-24

## 2025-03-24 PROBLEM — J45.909 MATERNAL ASTHMA COMPLICATING PREGNANCY (HHS-HCC): Status: RESOLVED | Noted: 2024-07-25 | Resolved: 2025-03-24

## 2025-03-24 PROBLEM — O09.299 HISTORY OF PRE-ECLAMPSIA IN PRIOR PREGNANCY, CURRENTLY PREGNANT (HHS-HCC): Status: RESOLVED | Noted: 2024-10-14 | Resolved: 2025-03-24

## 2025-03-24 PROBLEM — O23.41 URINARY TRACT INFECTION IN MOTHER DURING FIRST TRIMESTER OF PREGNANCY (HHS-HCC): Status: RESOLVED | Noted: 2024-10-14 | Resolved: 2025-03-24

## 2025-03-24 PROBLEM — E66.01 MORBID OBESITY (MULTI): Status: RESOLVED | Noted: 2024-05-10 | Resolved: 2025-03-24

## 2025-03-24 PROBLEM — Z67.91 RH NEGATIVE STATE IN ANTEPARTUM PERIOD (HHS-HCC): Status: RESOLVED | Noted: 2024-10-15 | Resolved: 2025-03-24

## 2025-03-24 PROBLEM — Z90.49 HISTORY OF CHOLECYSTECTOMY: Status: RESOLVED | Noted: 2024-07-25 | Resolved: 2025-03-24

## 2025-03-24 PROBLEM — O02.1 MISSED ABORTION (HHS-HCC): Status: RESOLVED | Noted: 2024-10-31 | Resolved: 2025-03-24

## 2025-03-24 PROBLEM — Z87.59 HISTORY OF PULMONARY EMBOLUS DURING PREGNANCY: Status: RESOLVED | Noted: 2024-07-25 | Resolved: 2025-03-24

## 2025-03-24 PROBLEM — G47.00 INSOMNIA: Status: RESOLVED | Noted: 2024-07-25 | Resolved: 2025-03-24

## 2025-03-24 PROBLEM — O02.1 IUFD AT LESS THAN 20 WEEKS OF GESTATION (HHS-HCC): Status: RESOLVED | Noted: 2024-10-31 | Resolved: 2025-03-24

## 2025-03-24 NOTE — PROGRESS NOTES
"Shefali Arango is a 30 y.o. here for IUD problems. Patient states that she's had AUB, \"recurrent\" UTIs (though all negative urine cultures since December), and abdominal pain since December (IUD was inserted in November).     Patient has increased frequency and dysuria recently. She is worried she has a UTI.     GynHx:  No LMP recorded.     Current contraception: IUD  HPV vaccination:  HPV x1   Last pap: 24 WNL  STI testing today: Yes all     OB History          6    Para   4    Term   3       1    AB   2    Living   3         SAB   1    IAB   1    Ectopic        Multiple        Live Births   4                  Past Medical History:   Diagnosis Date    Abdominal pain 2024    Comment on above: ABD PAIN     ABDOMINAL PAIN      Anxiety 2025    Asthma     Bronchitis     Essential (primary) hypertension 10/31/2014    Elevated BP    History of cholecystectomy 2024    History of pre-eclampsia     Hx of chlamydia infection     Insomnia 2024    Irritable bowel syndrome 05/10/2024    Pulmonary embolism 2021       Past Surgical History:   Procedure Laterality Date    D&C FIRST TRIMESTER / TX INCOMPLETE / MISSED / SEPTIC / INDUCED       GALLBLADDER SURGERY  03/15/2018    Gallbladder Surgery       Objective   /83   Pulse 93   Wt 132 kg (290 lb)   BMI 51.37 kg/m²     Physical Exam  Constitutional:       Appearance: Normal appearance.   HENT:      Head: Normocephalic.   Pulmonary:      Effort: Pulmonary effort is normal.   Neurological:      Mental Status: She is alert.   Skin:     General: Skin is warm and dry.   Psychiatric:         Mood and Affect: Mood normal.         Behavior: Behavior normal.         Thought Content: Thought content normal.         Judgment: Judgment normal.         Problem List Items Addressed This Visit       Pelvic pain in female    Overview     -Patient reports that since IUD was inserted in 2024 she has had AUB, abdominal " pain, and been told she has had multiple urinary tract infections though all urine cultures have been negative  -Patient also was told in the emergency room that her strings were lost  -Discussed with patient that I was happy to check her IUD strings today, patient declined  -Discussed with patient about my recommendation to get pelvic ultrasound to assess to see if the IUD is in the correct location, if it is patient would like to keep the IUD in, if it is not then we will attempt to remove the IUD and possibly reinsert another one given patient strongly does not want to be pregnant          Other Visit Diagnoses       Abnormal uterine bleeding (AUB)    -  Primary    Relevant Orders    US PELVIS TRANSABDOMINAL WITH TRANSVAGINAL    Female pelvic pain        Dysuria        Relevant Orders    Urine Culture    Screening examination for STI        Relevant Orders    HIV 1/2 Antigen/Antibody Screen with Reflex to Confirmation    Hepatitis C Antibody    Hepatitis B Surface Antigen    Syphilis Screen with Reflex    C. trachomatis / N. gonorrhoeae, Amplified, Urogenital    Trichomonas vaginalis, Amplified            Yanira Carter, TOYIN-EDSON     No

## 2025-03-25 ENCOUNTER — OFFICE VISIT (OUTPATIENT)
Dept: OBSTETRICS AND GYNECOLOGY | Facility: CLINIC | Age: 30
End: 2025-03-25
Payer: COMMERCIAL

## 2025-03-25 VITALS
WEIGHT: 290 LBS | DIASTOLIC BLOOD PRESSURE: 83 MMHG | BODY MASS INDEX: 51.37 KG/M2 | HEART RATE: 93 BPM | SYSTOLIC BLOOD PRESSURE: 122 MMHG

## 2025-03-25 DIAGNOSIS — N93.9 ABNORMAL UTERINE BLEEDING (AUB): Primary | ICD-10-CM

## 2025-03-25 DIAGNOSIS — R30.0 DYSURIA: ICD-10-CM

## 2025-03-25 DIAGNOSIS — R10.2 FEMALE PELVIC PAIN: ICD-10-CM

## 2025-03-25 DIAGNOSIS — R10.2 PELVIC PAIN IN FEMALE: ICD-10-CM

## 2025-03-25 DIAGNOSIS — Z11.3 SCREENING EXAMINATION FOR STI: ICD-10-CM

## 2025-03-25 PROCEDURE — 99213 OFFICE O/P EST LOW 20 MIN: CPT | Performed by: ADVANCED PRACTICE MIDWIFE

## 2025-03-25 PROCEDURE — 1036F TOBACCO NON-USER: CPT | Performed by: ADVANCED PRACTICE MIDWIFE

## 2025-03-25 ASSESSMENT — ENCOUNTER SYMPTOMS
CARDIOVASCULAR NEGATIVE: 0
GASTROINTESTINAL NEGATIVE: 0
MUSCULOSKELETAL NEGATIVE: 0
ALLERGIC/IMMUNOLOGIC NEGATIVE: 0
PSYCHIATRIC NEGATIVE: 0
NEUROLOGICAL NEGATIVE: 0
ENDOCRINE NEGATIVE: 0
RESPIRATORY NEGATIVE: 0
HEMATOLOGIC/LYMPHATIC NEGATIVE: 0
EYES NEGATIVE: 0
CONSTITUTIONAL NEGATIVE: 0

## 2025-03-25 ASSESSMENT — PAIN SCALES - GENERAL: PAINLEVEL_OUTOF10: 0-NO PAIN

## 2025-03-26 LAB
HBV SURFACE AG SERPL QL IA: NORMAL
HCV AB SERPL QL IA: NORMAL
HIV 1+2 AB+HIV1 P24 AG SERPL QL IA: NORMAL

## 2025-03-27 ENCOUNTER — TELEPHONE (OUTPATIENT)
Dept: OBSTETRICS AND GYNECOLOGY | Facility: CLINIC | Age: 30
End: 2025-03-27
Payer: COMMERCIAL

## 2025-03-27 DIAGNOSIS — R11.0 NAUSEA: Primary | ICD-10-CM

## 2025-03-27 DIAGNOSIS — R10.9 ABDOMINAL PAIN, UNSPECIFIED ABDOMINAL LOCATION: ICD-10-CM

## 2025-03-27 LAB
BACTERIA UR CULT: NORMAL
C TRACH RRNA SPEC QL NAA+PROBE: NOT DETECTED
N GONORRHOEA RRNA SPEC QL NAA+PROBE: NOT DETECTED
QUEST GC CT AMPLIFIED (ALWAYS MESSAGE): NORMAL
T VAGINALIS RRNA SPEC QL NAA+PROBE: NOT DETECTED

## 2025-03-31 LAB
HBV SURFACE AG SERPL QL IA: NORMAL
HCV AB SERPL QL IA: NORMAL
HIV 1+2 AB+HIV1 P24 AG SERPL QL IA: NORMAL
T PALLIDUM AB SER QL IA: NEGATIVE

## 2025-04-01 ENCOUNTER — HOSPITAL ENCOUNTER (OUTPATIENT)
Dept: RADIOLOGY | Facility: HOSPITAL | Age: 30
Discharge: HOME | End: 2025-04-01
Payer: COMMERCIAL

## 2025-04-01 DIAGNOSIS — N93.9 ABNORMAL UTERINE BLEEDING (AUB): ICD-10-CM

## 2025-04-01 PROCEDURE — 76830 TRANSVAGINAL US NON-OB: CPT | Performed by: RADIOLOGY

## 2025-04-01 PROCEDURE — 76856 US EXAM PELVIC COMPLETE: CPT

## 2025-04-01 PROCEDURE — 76856 US EXAM PELVIC COMPLETE: CPT | Performed by: RADIOLOGY

## 2025-04-09 ENCOUNTER — TELEMEDICINE (OUTPATIENT)
Dept: NEUROLOGY | Facility: CLINIC | Age: 30
End: 2025-04-09
Payer: COMMERCIAL

## 2025-04-09 DIAGNOSIS — R42 POSTURAL DIZZINESS WITH NEAR SYNCOPE: Primary | ICD-10-CM

## 2025-04-09 DIAGNOSIS — R55 POSTURAL DIZZINESS WITH NEAR SYNCOPE: Primary | ICD-10-CM

## 2025-04-09 PROCEDURE — 99214 OFFICE O/P EST MOD 30 MIN: CPT | Performed by: NURSE PRACTITIONER

## 2025-04-09 RX ORDER — ACETAMINOPHEN 500 MG
1 TABLET ORAL 2 TIMES WEEKLY
Qty: 1 KIT | Refills: 0 | Status: SHIPPED | OUTPATIENT
Start: 2025-04-10

## 2025-04-09 NOTE — PROGRESS NOTES
Virtual or Telephone Consent    An interactive audio and video telecommunication system which permits real time communications between the patient (at the originating site) and provider (at the distant site) was utilized to provide this telehealth service.   Verbal consent was requested and obtained from Shefali Arango on this date, 04/09/25 for a telehealth visit and the patient's location was confirmed at the time of the visit.    Being assessed today for initial evaluation of postural dizziness with near syncope.  She reports that her symptoms started last spring and they wax and wane depending on position changes.  She will have near syncopal events when she changes position from of bending to standing position.  She is also reporting some GI issues as well.  She had to keep an orthostatic diary.  Reviewed the process of obtaining that data.  Patient verbalized understanding.  Discussed simple measures to help counteract her symptoms such as staying hydrated with noncaffeinated fluids, electrolyte replacement fluids, if not contraindicated to increase salt intake, eating smaller more frequent meals, avoiding chocolate and caffeine, avoiding stressors.  When anticipating extended periods of weightbearing activity outside the home to use compression stockings.  We will get autonomic testing completed for further evaluation of the near syncope events.  Follow-up after testing completed.    This note was created with voice recognition software and was not corrected for typographical or grammatical errors

## 2025-04-15 ENCOUNTER — TELEPHONE (OUTPATIENT)
Dept: PRIMARY CARE | Facility: CLINIC | Age: 30
End: 2025-04-15
Payer: COMMERCIAL

## 2025-04-15 DIAGNOSIS — F41.9 ANXIETY: Primary | ICD-10-CM

## 2025-04-15 RX ORDER — ESCITALOPRAM OXALATE 10 MG/1
10 TABLET ORAL DAILY
Qty: 30 TABLET | Refills: 5 | Status: SHIPPED | OUTPATIENT
Start: 2025-04-15 | End: 2025-10-12

## 2025-04-17 ENCOUNTER — TELEMEDICINE (OUTPATIENT)
Dept: PRIMARY CARE | Facility: CLINIC | Age: 30
End: 2025-04-17
Payer: COMMERCIAL

## 2025-04-17 DIAGNOSIS — F41.9 ANXIETY: Primary | ICD-10-CM

## 2025-04-17 PROCEDURE — 99212 OFFICE O/P EST SF 10 MIN: CPT | Performed by: INTERNAL MEDICINE

## 2025-04-18 ASSESSMENT — ENCOUNTER SYMPTOMS
SHORTNESS OF BREATH: 0
ABDOMINAL PAIN: 0
NAUSEA: 0
FEVER: 0
CHILLS: 0
VOMITING: 0

## 2025-04-18 NOTE — PROGRESS NOTES
Subjective   Patient ID: Shefali Arango is a 30 y.o. female who presents for virtual visit.    Spoke with patient briefly about the Lexapro that was prescribed by me. Apparently she never received it so will be starting it now. Otherwise no complaints offered.          Review of Systems   Constitutional:  Negative for chills and fever.   Respiratory:  Negative for shortness of breath.    Cardiovascular:  Negative for chest pain.   Gastrointestinal:  Negative for abdominal pain, nausea and vomiting.       Objective   There were no vitals taken for this visit.    Physical Exam  Virtual visit    Assessment/Plan   Anxiety: will start Lexapro  Vv in one month.

## 2025-05-06 DIAGNOSIS — R42 POSTURAL DIZZINESS WITH NEAR SYNCOPE: ICD-10-CM

## 2025-05-06 DIAGNOSIS — R55 POSTURAL DIZZINESS WITH NEAR SYNCOPE: ICD-10-CM

## 2025-05-06 RX ORDER — ACETAMINOPHEN 500 MG
1 TABLET ORAL 2 TIMES WEEKLY
Qty: 1 KIT | Refills: 0 | Status: SHIPPED | OUTPATIENT
Start: 2025-05-08

## 2025-05-20 ENCOUNTER — APPOINTMENT (OUTPATIENT)
Dept: PRIMARY CARE | Facility: CLINIC | Age: 30
End: 2025-05-20
Payer: COMMERCIAL

## 2025-07-10 ENCOUNTER — OFFICE VISIT (OUTPATIENT)
Dept: OBSTETRICS AND GYNECOLOGY | Facility: CLINIC | Age: 30
End: 2025-07-10
Payer: COMMERCIAL

## 2025-07-10 VITALS
DIASTOLIC BLOOD PRESSURE: 88 MMHG | HEART RATE: 94 BPM | BODY MASS INDEX: 54.7 KG/M2 | SYSTOLIC BLOOD PRESSURE: 134 MMHG | WEIGHT: 293 LBS

## 2025-07-10 DIAGNOSIS — N76.0 ACUTE VAGINITIS: Primary | ICD-10-CM

## 2025-07-10 DIAGNOSIS — N90.89 VULVAR IRRITATION: ICD-10-CM

## 2025-07-10 DIAGNOSIS — Z01.419 WOMEN'S ANNUAL ROUTINE GYNECOLOGICAL EXAMINATION: ICD-10-CM

## 2025-07-10 LAB
BILIRUBIN, POC: NEGATIVE
BLOOD URINE, POC: NEGATIVE
CLARITY, POC: CLEAR
COLOR, POC: YELLOW
GLUCOSE URINE, POC: NEGATIVE
KETONES, POC: NEGATIVE
LEUKOCYTE EST, POC: NORMAL
NITRITE, POC: NEGATIVE
PH, POC: 7
POC APPEARANCE OF BODY FLUID: CLEAR
SPECIFIC GRAVITY, POC: 1.02
URINE PROTEIN, POC: NEGATIVE
UROBILINOGEN, POC: 0.2

## 2025-07-10 PROCEDURE — 81002 URINALYSIS NONAUTO W/O SCOPE: CPT

## 2025-07-10 PROCEDURE — 99214 OFFICE O/P EST MOD 30 MIN: CPT

## 2025-07-10 PROCEDURE — 99214 OFFICE O/P EST MOD 30 MIN: CPT | Mod: GC

## 2025-07-10 PROCEDURE — 1036F TOBACCO NON-USER: CPT

## 2025-07-10 RX ORDER — MAG HYDROX/ALUMINUM HYD/SIMETH 200-200-20
SUSPENSION, ORAL (FINAL DOSE FORM) ORAL 2 TIMES DAILY
Qty: 28 G | Refills: 0 | Status: SHIPPED | OUTPATIENT
Start: 2025-07-10 | End: 2025-07-17

## 2025-07-10 ASSESSMENT — ENCOUNTER SYMPTOMS
PSYCHIATRIC NEGATIVE: 0
ABDOMINAL DISTENTION: 0
MUSCULOSKELETAL NEGATIVE: 0
GASTROINTESTINAL NEGATIVE: 0
NEUROLOGICAL NEGATIVE: 0
CONSTITUTIONAL NEGATIVE: 0
HEMATURIA: 0
ALLERGIC/IMMUNOLOGIC NEGATIVE: 0
RESPIRATORY NEGATIVE: 0
ENDOCRINE NEGATIVE: 0
EYES NEGATIVE: 0
HEMATOLOGIC/LYMPHATIC NEGATIVE: 0
CARDIOVASCULAR NEGATIVE: 0
ABDOMINAL PAIN: 0
DYSURIA: 0

## 2025-07-10 NOTE — PROGRESS NOTES
Subjective   Patient ID: Shefali Arango is a 30 y.o. female who presents for Follow-up (Pt here for follow up from er for cyst bon right side of ovaries/Lmp-IUD/Last pap-24/Std-declined/Chaperone-accepted  ).    HPI    31 yo  who presents for ovarian cyst follow up. Pt has known 4 x 6 cm right ovarian cyst, diagnosed on TVUS in 2025. She presented to the ED on  with severe 10/10 sharp lower abdominal pain radiating to the gluteal area with associated nausea.  CT scan demonstrated a 4.4 × 3.8 cm water-attenuating right adnexal lesion, consistent in size with a prior ultrasound from  and IUD in proper location. Patient was discharged home.     Since ED visit, patient reports significantly improved pain, with intermittent cramping. She has IUD in place, unknown LMP but has intermittent vaginal spotting, now changing through 2-3 pads instead of 1 when it occurs.    Separately, patient reports vaginal irritation and itching for the past two days. She recently started a new body wash a couple of weeks ago but has since returned to her usual Dove body wash. She has a history of UTIs and yeast infections, with her last UTI in 2023. UA on  was within normal limits. She is concerned about a possible UTI or yeast infection.She notes polyuria but denies vaginal discharge or dysuria.     ObHx: , x4 , xSAB in 2024, x IAB in . She does not currently have regular menses due to an LNG-IUD placed in 2024, Last Pap smear on 24 was normal, denies STD hx  PMHx: hypertension, PE on blood thinners, IBS, asthma, gallstones, and possible POTS.  PSH: D&C 2024, Laparoscopic cholecystectomy 2018      Review of Systems   Gastrointestinal:  Negative for abdominal distention and abdominal pain.   Genitourinary:  Negative for dysuria, hematuria, menstrual problem, pelvic pain, vaginal bleeding and vaginal discharge.   All other systems reviewed and are negative.      Objective  "  Physical Exam  Exam conducted with a chaperone present.   Constitutional:       General: She is not in acute distress.     Appearance: Normal appearance.   Genitourinary:     Pubic Area: Rash present.      Vagina: Vaginal discharge present.      Comments: Minimal white vaginal discharge.   White and thin appearing \"kissing\" pattern overlying vulva to bilateral inguinal tisssue with irritation and itching to palpation,  Neurological:      Mental Status: She is alert.         Assessment/Plan   31 yo  who presents for ovarian cyst follow up    Ovarian cyst  - pt with known right ovarian cyst, measuring 4 x 6 cm. Last presented to ED for acute abdominal pain, currently resolved, suspect simple vs hemorrhagic cyst. Discussed recommendation to continue IUD for uterine lining protection  - Continue Zofran PRN for nausea  - Counseled on return precautions (severe pain, fever, persistent vomiting)  - Recommend scheduling an annual visit to follow up on ovarian cyst and pain    Vulvar Irritation   - vulvar exam notable for thin, white patchy \"kissing\" pattern, concerning for underlying topical fungal infection. Prescription for hydrocortisone sent to pharmacy for supportive care   - UA trace leuks/ neg nitrites, lower suspicion for UTI   - Vaginitis swab collected  - Last Hgb A1c 5%, repeat A1c today     RTC for annual exam and ovarian cyst follow up     Nguyen Brandonfrancesco, MS3 07/10/25 2:54 PM     I saw the patient with the medical student and made edits within the text,    Seen and d/w Dr. Cain,  Debi Potter MD, PGY4   "

## 2025-07-11 LAB
BV SCORE VAG QL: NORMAL
EST. AVERAGE GLUCOSE BLD GHB EST-MCNC: 120 MG/DL
EST. AVERAGE GLUCOSE BLD GHB EST-SCNC: 6.6 MMOL/L
HBA1C MFR BLD: 5.8 %

## 2025-07-12 NOTE — PROGRESS NOTES
I saw and evaluated the patient. I personally obtained the key and critical portions of the history and physical exam or was physically present for key and critical portions performed by the resident/fellow. I reviewed the resident/fellow's documentation and discussed the patient with the resident/fellow. I agree with the resident/fellow's medical decision making as documented in the note.    Karolina Cain MD

## 2025-07-14 DIAGNOSIS — N76.0 BACTERIAL VAGINOSIS: Primary | ICD-10-CM

## 2025-07-14 DIAGNOSIS — B96.89 BACTERIAL VAGINOSIS: Primary | ICD-10-CM

## 2025-07-14 RX ORDER — METRONIDAZOLE 7.5 MG/G
GEL VAGINAL DAILY
Qty: 70 G | Refills: 0 | Status: SHIPPED | OUTPATIENT
Start: 2025-07-14 | End: 2025-07-19

## (undated) DEVICE — COUNTER, NEEDLE, FOAM BLOCK, W/MAGNET, W/BLADE GUARD, 10 COUNT, RED, LF

## (undated) DEVICE — PAD, SANITARY, OBSTETRICAL, W/ADHSV STRIP,11 IN,LF

## (undated) DEVICE — REST, HEAD, BAGEL, 9 IN

## (undated) DEVICE — COVER, CART, 45 X 27 X 48 IN, CLEAR

## (undated) DEVICE — COVER, TABLE, 44 X 75 IN, DISPOSABLE, LF, STERILE

## (undated) DEVICE — SYRINGE, HYPODERMIC, CONTROL, LUER LOCK, 10 CC, PLASTIC, STERILE

## (undated) DEVICE — CURETTE, UTERINE, VACUUM, CURVED, OPEN TIP, 8 MM, PLASTIC, CLEAR

## (undated) DEVICE — CURETTE, UTERINE, VACUUM, CURVED, OPEN TIP, 10 MM, PLASTIC, CLEAR

## (undated) DEVICE — Device

## (undated) DEVICE — SPONGE, GAUZE, XRAY DECT, 16 PLY, 4 X 4, W/MASTER DMT,STERILE

## (undated) DEVICE — MARKER, SKIN, RULER AND LABEL PACK, CUSTOM

## (undated) DEVICE — GOWN, ASTOUND, L

## (undated) DEVICE — TOWEL, SURGICAL, NEURO, O/R, 16 X 26, BLUE, STERILE

## (undated) DEVICE — DRAPE, PAD, PREP, W/ 9 IN CUFF, 24 X 41, LF, NS

## (undated) DEVICE — TUBING, SUCTION, VACUUM, INTRAUTERINE, CURETTAGE, HANDLE, MALE ADAPTER, 6 FT X 0.375 IN

## (undated) DEVICE — GOWN, SURGICAL, SMARTGOWN, XLARGE, STERILE

## (undated) DEVICE — BASIN SET, D & C

## (undated) DEVICE — SPONGE GAUZE, XRAY SC+RFID, 4X4 16 PLY, STERILE

## (undated) DEVICE — NEEDLE, SPINAL, 22 G X 3.5 IN, BLACK HUB

## (undated) DEVICE — COVER, LIGHT HANDLE, SURGICAL, FLEXIBLE, DISPOSABLE, STERILE

## (undated) DEVICE — PREP TRAY, SKIN, DRY, W/GLOVES